# Patient Record
Sex: FEMALE | Race: WHITE | NOT HISPANIC OR LATINO | Employment: OTHER | URBAN - METROPOLITAN AREA
[De-identification: names, ages, dates, MRNs, and addresses within clinical notes are randomized per-mention and may not be internally consistent; named-entity substitution may affect disease eponyms.]

---

## 2017-02-01 ENCOUNTER — TRANSFERRED RECORDS (OUTPATIENT)
Dept: HEALTH INFORMATION MANAGEMENT | Facility: CLINIC | Age: 77
End: 2017-02-01

## 2017-03-05 DIAGNOSIS — Z78.0 MENOPAUSE: ICD-10-CM

## 2017-03-05 DIAGNOSIS — I10 ESSENTIAL HYPERTENSION, BENIGN: Chronic | ICD-10-CM

## 2017-03-06 NOTE — TELEPHONE ENCOUNTER
metoprolol (TOPROL-XL) 50 MG 24 hr tablet 90 tablet 0 12/12/2016         Last Written Prescription Date: 12/12/2016  Last Fill Quantity: 90, # refills: 0    Last Office Visit with [facility], P or [facility] prescribing provider:  12/18/2015   Future Office Visit:        BP Readings from Last 3 Encounters:   07/13/16 126/90   01/04/16 111/78   12/21/15 (!) 116/97

## 2017-03-07 NOTE — TELEPHONE ENCOUNTER
Routing to TC.   Please contact patient and schedule Ov with PCP.    Patient overdue.  Notified with last refill, however did not schedule.   When scheduled, please route back to refill pool for RX completion.    Thank you,  Mary Foote RN, BSN

## 2017-03-08 RX ORDER — METOPROLOL SUCCINATE 50 MG/1
TABLET, EXTENDED RELEASE ORAL
Qty: 90 TABLET | Refills: 0 | Status: SHIPPED | OUTPATIENT
Start: 2017-03-08 | End: 2017-04-10

## 2017-03-08 NOTE — TELEPHONE ENCOUNTER
Routing refill request to provider for review/approval because:  Patient needs to be seen because it has been more than 1 year since last office visit.  Patient is now scheduled with [facility] for medication check 3/15/17  Pended script for 30 day supply with note that further refills to be addressed at appointment.   PCP: Please see pended medication    Thank you!    Adelia Talbot RN

## 2017-03-09 NOTE — TELEPHONE ENCOUNTER
Rx sent in for 90 day supply - if she wants to set up an apt with me in the next few months rather than with S[facility] that is fine

## 2017-04-01 DIAGNOSIS — I10 ESSENTIAL HYPERTENSION, BENIGN: Chronic | ICD-10-CM

## 2017-04-01 DIAGNOSIS — Z78.0 MENOPAUSE: ICD-10-CM

## 2017-04-01 NOTE — TELEPHONE ENCOUNTER
PREMARIN 0.625 MG tablet        Last Written Prescription Date: 12/12/2016  Last Fill Quantity: 90, # refills: 0  Last Office Visit with [facility], [facility] or [facility] prescribing provider: 12/18/2015  Next 5 appointments (look out 90 days)     Apr 10, 2017  9:30 AM CDT   [facility] Visit with Pamela Bobby DO   [facility]    [address] [facility] [address]   [phone]                   BP Readings from Last 3 Encounters:   07/13/16 126/90   01/04/16 111/78   12/21/15 (!) 116/97     Date of last Breast Exam: 12/15/2016

## 2017-04-03 RX ORDER — CONJUGATED ESTROGENS 0.62 MG/1
TABLET, FILM COATED ORAL
Qty: 10 TABLET | Refills: 0 | Status: SHIPPED | OUTPATIENT
Start: 2017-04-03 | End: 2017-04-10

## 2017-04-03 NOTE — TELEPHONE ENCOUNTER
Routing refill request to provider for review/approval because:  Patient is very overdue for OV with PCP.   Has scheduled appointment 4-10-17---however has multiple cancels after scheduling appointments.      Mary Foote RN, BSN

## 2017-04-08 DIAGNOSIS — Z78.0 MENOPAUSE: ICD-10-CM

## 2017-04-08 DIAGNOSIS — I10 ESSENTIAL HYPERTENSION, BENIGN: Chronic | ICD-10-CM

## 2017-04-10 ENCOUNTER — OFFICE VISIT (OUTPATIENT)
Dept: FAMILY MEDICINE | Facility: CLINIC | Age: 77
End: 2017-04-10
Payer: COMMERCIAL

## 2017-04-10 VITALS
DIASTOLIC BLOOD PRESSURE: 87 MMHG | SYSTOLIC BLOOD PRESSURE: 127 MMHG | HEART RATE: 65 BPM | OXYGEN SATURATION: 98 % | TEMPERATURE: 97.3 F | HEIGHT: 63 IN | RESPIRATION RATE: 18 BRPM | WEIGHT: 139 LBS | BODY MASS INDEX: 24.63 KG/M2

## 2017-04-10 DIAGNOSIS — I10 ESSENTIAL HYPERTENSION, BENIGN: Chronic | ICD-10-CM

## 2017-04-10 DIAGNOSIS — N39.41 URGE URINARY INCONTINENCE: Primary | Chronic | ICD-10-CM

## 2017-04-10 DIAGNOSIS — R19.8 IRREGULAR BOWEL HABITS: ICD-10-CM

## 2017-04-10 DIAGNOSIS — Z78.0 MENOPAUSE: ICD-10-CM

## 2017-04-10 DIAGNOSIS — Z12.11 SCREENING FOR COLON CANCER: ICD-10-CM

## 2017-04-10 PROCEDURE — 99214 OFFICE O/P EST MOD 30 MIN: CPT | Performed by: INTERNAL MEDICINE

## 2017-04-10 RX ORDER — DEXAMETHASONE 0.5 MG/5ML
ELIXIR ORAL DAILY PRN
COMMUNITY
Start: 2017-02-02 | End: 2024-02-09

## 2017-04-10 RX ORDER — MIRABEGRON 25 MG/1
25 TABLET, FILM COATED, EXTENDED RELEASE ORAL DAILY
Qty: 30 TABLET | Refills: 0 | Status: SHIPPED | OUTPATIENT
Start: 2017-04-10 | End: 2017-04-25

## 2017-04-10 RX ORDER — SUCRALFATE 1 G/10ML
SUSPENSION ORAL
Status: ON HOLD | COMMUNITY
Start: 2017-02-24 | End: 2017-05-25

## 2017-04-10 RX ORDER — METOPROLOL SUCCINATE 50 MG/1
50 TABLET, EXTENDED RELEASE ORAL DAILY
Qty: 90 TABLET | Refills: 3 | Status: CANCELLED | OUTPATIENT
Start: 2017-04-10

## 2017-04-10 RX ORDER — METOPROLOL SUCCINATE 50 MG/1
50 TABLET, EXTENDED RELEASE ORAL DAILY
Qty: 90 TABLET | Refills: 3 | Status: SHIPPED | OUTPATIENT
Start: 2017-04-10 | End: 2018-06-27

## 2017-04-10 RX ORDER — CYCLOSPORINE 0.5 MG/ML
EMULSION OPHTHALMIC
COMMUNITY
Start: 2017-02-13 | End: 2017-09-08

## 2017-04-10 RX ORDER — CONJUGATED ESTROGENS 0.62 MG/1
TABLET, FILM COATED ORAL
Qty: 90 TABLET | Refills: 0 | OUTPATIENT
Start: 2017-04-10

## 2017-04-10 RX ORDER — CEVIMELINE HYDROCHLORIDE 30 MG/1
30 CAPSULE ORAL 3 TIMES DAILY
COMMUNITY
Start: 2017-02-09

## 2017-04-10 ASSESSMENT — ANXIETY QUESTIONNAIRES
2. NOT BEING ABLE TO STOP OR CONTROL WORRYING: SEVERAL DAYS
3. WORRYING TOO MUCH ABOUT DIFFERENT THINGS: SEVERAL DAYS
7. FEELING AFRAID AS IF SOMETHING AWFUL MIGHT HAPPEN: SEVERAL DAYS
6. BECOMING EASILY ANNOYED OR IRRITABLE: NOT AT ALL
IF YOU CHECKED OFF ANY PROBLEMS ON THIS QUESTIONNAIRE, HOW DIFFICULT HAVE THESE PROBLEMS MADE IT FOR YOU TO DO YOUR WORK, TAKE CARE OF THINGS AT HOME, OR GET ALONG WITH OTHER PEOPLE: NOT DIFFICULT AT ALL
GAD7 TOTAL SCORE: 4
1. FEELING NERVOUS, ANXIOUS, OR ON EDGE: NOT AT ALL
5. BEING SO RESTLESS THAT IT IS HARD TO SIT STILL: NOT AT ALL

## 2017-04-10 ASSESSMENT — PATIENT HEALTH QUESTIONNAIRE - PHQ9: 5. POOR APPETITE OR OVEREATING: SEVERAL DAYS

## 2017-04-10 NOTE — PATIENT INSTRUCTIONS
You will be contacted by a  service to schedule colonoscopy for this spring.     Begin taking Myrbetriq daily. Let me know if this is as effective as Oxybutynin, monitor for any changes to blood pressure. Eventually if this works well can increase to 50 mg Myrbetriq daily and STOP the Oxybutynin.    Try taking 1/2-1 cap of Miralax a day and see if that helps with bowel movements.     I refilled your Metoprolol and Premarin.  Follow up with me annually or sooner as needed.

## 2017-04-10 NOTE — MR AVS SNAPSHOT
After Visit Summary   4/10/2017    Ashanti Jarvis    MRN: [id number]           Patient Information     Date Of Birth          1940        Visit Information        Provider Department      4/10/2017 9:30 AM Pamela Bobby,  [facility]        Today's Diagnoses     Urge urinary incontinence    -  1    Essential hypertension, benign        Menopause        Screening for colon cancer        Irregular bowel habits          Care Instructions      You will be contacted by a  service to schedule colonoscopy for this spring.     Begin taking Myrbetriq daily. Let me know if this is as effective as Oxybutynin, monitor for any changes to blood pressure. Eventually if this works well can increase to 50 mg Myrbetriq daily and STOP the Oxybutynin.    Try taking 1/2-1 cap of Miralax a day and see if that helps with bowel movements.     I refilled your Metoprolol and Premarin.  Follow up with me annually or sooner as needed.         Follow-ups after your visit        Additional Services     GASTROENTEROLOGY ADULT REF PROCEDURE ONLY       Last Lab Result: Creatinine (mg/dL)       Date                     Value                 11/24/2015               0.6              ----------  Body mass index is 24.62 kg/(m^2).     Needed:  No  Language:  English    Patient will be contacted to schedule procedure.     Please be aware that coverage of these services is subject to the terms and limitations of your health insurance plan.  Call member services at your health plan with any benefit or coverage questions.  Any procedures must be performed at a [facility] facility OR coordinated by your clinic's referral office.    Please bring the following with you to your appointment:    (1) Any X-Rays, CTs or MRIs which have been performed.  Contact the facility where they were done to arrange for  prior to your scheduled appointment.    (2) List of current medications   (3) This  "referral request   (4) Any documents/labs given to you for this referral                  Who to contact     If you have questions or need follow up information about today's clinic visit or your schedule please contact [facility] directly at [phone].  Normal or non-critical lab and imaging results will be communicated to you by Tr[employer], letter or phone within 4 business days after the clinic has received the results. If you do not hear from us within 7 days, please contact the clinic through Tranzlog[employer] or phone. If you have a critical or abnormal lab result, we will notify you by phone as soon as possible.  Submit refill requests through Signpost or call your pharmacy and they will forward the refill request to us. Please allow 3 business days for your refill to be completed.          Additional Information About Your Visit        [employer] Information     Signpost gives you secure access to your electronic health record. If you see a primary care provider, you can also send messages to your care team and make appointments. If you have questions, please call your primary care clinic.  If you do not have a primary care provider, please call [phone] and they will assist you.        Care EveryWhere ID     This is your Care EveryWhere ID. This could be used by other organizations to access your [place] medical records  [id number]        Your Vitals Were     Pulse Temperature Respirations Height Pulse Oximetry BMI (Body Mass Index)    65 97.3  F (36.3  C) (Oral) 18 5' 3\" (1.6 m) 98% 24.62 kg/m2       Blood Pressure from Last 3 Encounters:   04/10/17 127/87   07/13/16 126/90   01/04/16 111/78    Weight from Last 3 Encounters:   04/10/17 139 lb (63 kg)   07/13/16 140 lb (63.5 kg)   12/21/15 148 lb (67.1 kg)              We Performed the Following     GASTROENTEROLOGY ADULT REF PROCEDURE ONLY          Today's Medication Changes          These changes are accurate as of: 4/10/17 10:05 AM.  If you have any " questions, ask your nurse or doctor.               Start taking these medicines.        Dose/Directions    mirabegron 25 MG 24 hr tablet   Commonly known as:  MYRBETRIQ   Used for:  Urge urinary incontinence   Started by:  Pamela Bobby DO        Dose:  25 mg   Take 1 tablet (25 mg) by mouth daily   Quantity:  30 tablet   Refills:  0         These medicines have changed or have updated prescriptions.        Dose/Directions    estrogens (conjugated) 0.625 MG tablet   Commonly known as:  PREMARIN   This may have changed:  See the new instructions.   Used for:  Essential hypertension, benign, Menopause   Changed by:  Pamela Bobby DO        Dose:  0.625 mg   Take 1 tablet (0.625 mg) by mouth daily   Quantity:  90 tablet   Refills:  3       metoprolol 50 MG 24 hr tablet   Commonly known as:  TOPROL-XL   This may have changed:  See the new instructions.   Used for:  Essential hypertension, benign   Changed by:  Pamela Bobby DO        Dose:  50 mg   Take 1 tablet (50 mg) by mouth daily   Quantity:  90 tablet   Refills:  3         Stop taking these medicines if you haven't already. Please contact your care team if you have questions.     calcium carb 1250 mg (500 mg [place])/vitamin D 200 units 500-200 MG-UNIT per tablet   Commonly known as:  OSCAL with D   Stopped by:  Pamela Bobby DO           oxyCODONE-acetaminophen 5-325 MG per tablet   Commonly known as:  PERCOCET   Stopped by:  Pamela Bobby DO                Where to get your medicines      These medications were sent to [facility] - [place], [place] - [address]  [address] [facility] [address], [place] [address]     Phone:  [phone]     estrogens (conjugated) 0.625 MG tablet    metoprolol 50 MG 24 hr tablet    mirabegron 25 MG 24 hr tablet                Primary Care Provider Office Phone # Fax #    Pamela Bobby [phone]       [facility] [address]  [place]  [place] [address]        Thank you!     Thank you for choosing [facility]  for your care. Our goal is always to provide you with excellent care. Hearing back from our patients is one way we can continue to improve our services. Please take a few minutes to complete the written survey that you may receive in the mail after your visit with us. Thank you!             Your Updated Medication List - Protect others around you: Learn how to safely use, store and throw away your medicines at www.disposemymeds.org.          This list is accurate as of: 4/10/17 10:05 AM.  Always use your most recent med list.                   Brand Name Dispense Instructions for use    CARAFATE 1 GM/10ML suspension   Generic drug:  sucralfate          cevimeline 30 MG capsule    EVOXAC         dexamethasone 0.5 MG/5ML Elix          estrogens (conjugated) 0.625 MG tablet    PREMARIN    90 tablet    Take 1 tablet (0.625 mg) by mouth daily       fish oil-omega-3 fatty acids 1000 MG capsule     360 capsule    Take 2 capsules (2 g) by mouth daily       folic acid 1 MG tablet    FOLVITE    90 tablet    Take 1 tablet (1 mg) by mouth daily       leflunomide 20 MG tablet    ARAVA     Take 1 tablet by mouth daily       metoprolol 50 MG 24 hr tablet    TOPROL-XL    90 tablet    Take 1 tablet (50 mg) by mouth daily       mirabegron 25 MG 24 hr tablet    MYRBETRIQ    30 tablet    Take 1 tablet (25 mg) by mouth daily       Multi-vitamin Tabs tablet     100 tablet    Take 1 tablet by mouth daily       oxybutynin 5 MG 24 hr tablet    DITROPAN-XL    90 tablet    Take 1 tablet (5 mg) by mouth daily       PROBIOTIC FORMULA PO      Take  by mouth daily.       RESTASIS 0.05 % ophthalmic emulsion   Generic drug:  cycloSPORINE

## 2017-04-10 NOTE — PROGRESS NOTES
"  SUBJECTIVE:                                                    Ashanti Jarvis is a 76 year old female who presents to clinic today for the following health issues:    Hypertension Follow-up      Outpatient blood pressures are not being checked.    Low Salt Diet: not monitoring salt       Amount of exercise or physical activity: walking    Problems taking medications regularly: No    Medication side effects: none    Diet: regular (no restrictions)    Ashanti is a 76 YoF who presents to the clinic for a medication review. Pt is due to a colonoscopy with spring, last colonoscopy with benign polyps. No longer completing PAP smears, s/p total hysterectomy with ovaries in place. Up to date on mammogram.   Ashanti reports that her bowel movements have been smaller than normal, she feel that she is not passing a full movement in one sitting. Denies any pain with BMs.   Continues to take Oxybutynin with good effect but is experiencing negative side effect of dry mouth.   Would like to continue with hormone therapy and reports feeling well.     Problem list and histories reviewed & adjusted, as indicated.    ROS:  Detailed as above    This document serves as a record of the services and decisions personally performed and made by Pamela Bobby DO. It was created on his/her behalf by Elizabeth Saldivar, a trained medical scribe. The creation of this document is based the provider's statements to the medical scribe.  Scribariadne Saldivar 9:49 AM, April 10, 2017    OBJECTIVE:                                                    /87 (BP Location: Right arm, Patient Position: Right side, Cuff Size: Adult Regular)  Pulse 65  Temp 97.3  F (36.3  C) (Oral)  Resp 18  Ht 5' 3\" (1.6 m)  Wt 139 lb (63 kg)  SpO2 98%  BMI 24.62 kg/m2  Body mass index is 24.62 kg/(m^2).  Bright, pleasant, NAD, robust for age  HRRR no m/r/g  No carotid bruits     ASSESSMENT/PLAN:                                                      1. " Urge urinary incontinence  Try to taper off of  oxybutynin due to persistent dry mouth and possibly also contributing to her bowel symptoms.  Will gradually increase Myrbetriq if effective.   - mirabegron (MYRBETRIQ) 25 MG 24 hr tablet; Take 1 tablet (25 mg) by mouth daily  Dispense: 30 tablet; Refill: 0    2. Essential hypertension, benign  At goal.   Gets labs at rheumatologist, will request my labs at next visit which is in August.   - metoprolol (TOPROL-XL) 50 MG 24 hr tablet; Take 1 tablet (50 mg) by mouth daily  Dispense: 90 tablet; Refill: 3    3. Menopause  The current medical regimen is effective;  continue present plan and medications. Aware of s/e which could occur such as stroke/MI/thromboembolism, etc but she wishes to continue as she feels well on HRT   S/p hysterectomy (but ovaries still in place)  - estrogens, conjugated, (PREMARIN) 0.625 MG tablet; Take 1 tablet (0.625 mg) by mouth daily  Dispense: 90 tablet; Refill: 3    4. Screening for colon cancer  Due for this spring.   Polyps last colonoscopy.   - GASTROENTEROLOGY ADULT REF PROCEDURE ONLY    5. Irregular bowel habits  See pt instructions to trial Miralax.  Maybe related to pelvic floor dysfunction or Oxybutynin.  Colonoscopy due.    ANEL-7 SCORE 11/26/2012 4/10/2017   Total Score 13 -   Total Score - 4     Patient Instructions     You will be contacted by a  service to schedule colonoscopy for this spring.     Begin taking Myrbetriq daily. Let me know if this is as effective as Oxybutynin, monitor for any changes to blood pressure. Eventually if this works well can increase to 50 mg Myrbetriq daily and STOP the Oxybutynin.    Try taking 1/2-1 cap of Miralax a day and see if that helps with bowel movements.     I refilled your Metoprolol and Premarin.  Follow up with me annually or sooner as needed.     The information in this document, created by the medical scribe for me, accurately reflects the services I personally performed and the  decisions made by me. I have reviewed and approved this document for accuracy prior to leaving the patient care area.  Pamela Bobby DO  9:49 AM, 04/10/17    Pamela Bobby DO  [facility]

## 2017-04-10 NOTE — NURSING NOTE
"Chief Complaint   Patient presents with     Anxiety     Hypertension       Initial /87 (BP Location: Right arm, Patient Position: Right side, Cuff Size: Adult Regular)  Pulse 65  Temp 97.3  F (36.3  C) (Oral)  Resp 18  Ht 5' 3\" (1.6 m)  Wt 139 lb (63 kg)  SpO2 98%  BMI 24.62 kg/m2 Estimated body mass index is 24.62 kg/(m^2) as calculated from the following:    Height as of this encounter: 5' 3\" (1.6 m).    Weight as of this encounter: 139 lb (63 kg).  Medication Reconciliation: complete   Mariana Gomez CMA (AAMA)      "

## 2017-04-11 ASSESSMENT — ANXIETY QUESTIONNAIRES: GAD7 TOTAL SCORE: 4

## 2017-04-24 ENCOUNTER — MYC MEDICAL ADVICE (OUTPATIENT)
Dept: FAMILY MEDICINE | Facility: CLINIC | Age: 77
End: 2017-04-24

## 2017-04-24 DIAGNOSIS — N39.41 URGE URINARY INCONTINENCE: Chronic | ICD-10-CM

## 2017-04-25 RX ORDER — MIRABEGRON 25 MG/1
25 TABLET, FILM COATED, EXTENDED RELEASE ORAL DAILY
Qty: 90 TABLET | Refills: 1 | Status: ON HOLD | OUTPATIENT
Start: 2017-04-25 | End: 2017-05-25

## 2017-04-25 NOTE — TELEPHONE ENCOUNTER
Routing refill request to provider for review/approval because:  Drug not on the [facility] refill protocol   See below MyChart update from pt, this med is working well  Elo RUVALCABA RN

## 2017-04-25 NOTE — TELEPHONE ENCOUNTER
mirabegron (MYRBETRIQ) 25 MG 24 hr tablet        Last Written Prescription Date: 4/10/2017  Last Fill Quantity: 30,  # refills: 0   Last Office Visit with [facility], [facility] or [facility] prescribing provider: 4/10/2017

## 2017-05-04 ENCOUNTER — TRANSFERRED RECORDS (OUTPATIENT)
Dept: HEALTH INFORMATION MANAGEMENT | Facility: CLINIC | Age: 77
End: 2017-05-04

## 2017-05-04 LAB
ALT SERPL-CCNC: 26 IU/L (ref 5–35)
AST SERPL-CCNC: 23 U/L (ref 5–34)
CREAT SERPL-MCNC: 0.62 MG/DL (ref 0.5–1.3)
GFR SERPL CREATININE-BSD FRML MDRD: 100 ML/MIN/1.73M2

## 2017-05-25 ENCOUNTER — SURGERY (OUTPATIENT)
Age: 77
End: 2017-05-25

## 2017-05-25 ENCOUNTER — HOSPITAL ENCOUNTER (OUTPATIENT)
Facility: CLINIC | Age: 77
Discharge: HOME | End: 2017-05-25
Attending: SPECIALIST | Admitting: SPECIALIST
Payer: MEDICARE

## 2017-05-25 VITALS
DIASTOLIC BLOOD PRESSURE: 63 MMHG | HEIGHT: 63 IN | HEART RATE: 67 BPM | WEIGHT: 130 LBS | OXYGEN SATURATION: 95 % | SYSTOLIC BLOOD PRESSURE: 93 MMHG | BODY MASS INDEX: 23.04 KG/M2 | RESPIRATION RATE: 13 BRPM

## 2017-05-25 LAB — COLONOSCOPY: NORMAL

## 2017-05-25 PROCEDURE — 25000128 H RX IP 250 OP 636: Performed by: SPECIALIST

## 2017-05-25 PROCEDURE — 99153 MOD SED SAME PHYS/QHP EA: CPT | Performed by: SPECIALIST

## 2017-05-25 PROCEDURE — 45378 DIAGNOSTIC COLONOSCOPY: CPT | Performed by: SPECIALIST

## 2017-05-25 PROCEDURE — 25000125 ZZHC RX 250: Performed by: SPECIALIST

## 2017-05-25 PROCEDURE — G0105 COLORECTAL SCRN; HI RISK IND: HCPCS | Performed by: SPECIALIST

## 2017-05-25 PROCEDURE — G0500 MOD SEDAT ENDO SERVICE >5YRS: HCPCS | Performed by: SPECIALIST

## 2017-05-25 RX ORDER — ONDANSETRON 2 MG/ML
4 INJECTION INTRAMUSCULAR; INTRAVENOUS
Status: DISCONTINUED | OUTPATIENT
Start: 2017-05-25 | End: 2017-05-25 | Stop reason: HOSPADM

## 2017-05-25 RX ORDER — FENTANYL CITRATE 50 UG/ML
INJECTION, SOLUTION INTRAMUSCULAR; INTRAVENOUS PRN
Status: DISCONTINUED | OUTPATIENT
Start: 2017-05-25 | End: 2017-05-25 | Stop reason: HOSPADM

## 2017-05-25 RX ORDER — LIDOCAINE 40 MG/G
CREAM TOPICAL
Status: DISCONTINUED | OUTPATIENT
Start: 2017-05-25 | End: 2017-05-25 | Stop reason: HOSPADM

## 2017-05-25 RX ADMIN — FENTANYL CITRATE 25 MCG: 50 INJECTION, SOLUTION INTRAMUSCULAR; INTRAVENOUS at 12:56

## 2017-05-25 RX ADMIN — FENTANYL CITRATE 25 MCG: 50 INJECTION, SOLUTION INTRAMUSCULAR; INTRAVENOUS at 12:47

## 2017-05-25 RX ADMIN — FENTANYL CITRATE 25 MCG: 50 INJECTION, SOLUTION INTRAMUSCULAR; INTRAVENOUS at 12:46

## 2017-05-25 RX ADMIN — FENTANYL CITRATE 50 MCG: 50 INJECTION, SOLUTION INTRAMUSCULAR; INTRAVENOUS at 12:35

## 2017-05-25 RX ADMIN — MIDAZOLAM HYDROCHLORIDE 0.5 MG: 1 INJECTION, SOLUTION INTRAMUSCULAR; INTRAVENOUS at 12:56

## 2017-05-25 RX ADMIN — MIDAZOLAM HYDROCHLORIDE 1 MG: 1 INJECTION, SOLUTION INTRAMUSCULAR; INTRAVENOUS at 12:38

## 2017-05-25 RX ADMIN — MIDAZOLAM HYDROCHLORIDE 1 MG: 1 INJECTION, SOLUTION INTRAMUSCULAR; INTRAVENOUS at 12:32

## 2017-05-25 RX ADMIN — MIDAZOLAM HYDROCHLORIDE 1 MG: 1 INJECTION, SOLUTION INTRAMUSCULAR; INTRAVENOUS at 12:35

## 2017-05-25 RX ADMIN — FENTANYL CITRATE 50 MCG: 50 INJECTION, SOLUTION INTRAMUSCULAR; INTRAVENOUS at 12:32

## 2017-05-25 NOTE — BRIEF OP NOTE
[facility] Brief Operative Note    Pre-operative diagnosis: screening   Post-operative diagnosis Tortuous colon, diverticulosis     Procedure: Procedure(s):  colonoscopy - Wound Class: II-Clean Contaminated   Surgeon(s): Surgeon(s) and Role:     * Alan Crenshaw MD - Primary   Estimated blood loss: * No values recorded between 5/25/2017 12:00 AM and 5/25/2017  1:25 PM *    Specimens: * No specimens in log *   Findings: Please see ProVation procedure note in Chart Review

## 2017-05-25 NOTE — H&P
Pre-Endoscopy History and Physical     Ashanti Jarvis MRN# [id number]   YOB: 1940 Age: 76 year old     Date of Procedure: 5/25/2017  Primary care provider: Pamela Bobby  Type of Endoscopy: Colonoscopy with possible biopsy, possible polypectomy  Reason for Procedure: surveillance  Type of Anesthesia Anticipated: Conscious Sedation    HPI:    Ashanti is a 76 year old female who will be undergoing the above procedure.      A history and physical has been performed. The patient's medications and allergies have been reviewed. The risks and benefits of the procedure and the sedation options and risks were discussed with the patient.  All questions were answered and informed consent was obtained.      She denies a personal or family history of anesthesia complications or bleeding disorders.     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 12-12-12     Chronic maxillary sinusitis S/P drainage of maxillary, frontal sinuses 12-11-12     Senile osteoporosis     Urge urinary incontinence     Lichen planus        Past Medical History:   Diagnosis Date     Benign neoplasm of colon 5/01    tubular adenoma; one non-adenomatous polyp 2012 - rec f/u 5 years     Essential hypertension, benign      Essential tremor      Hx estrogen therapy     Since hysterectomy (estimates ~30 years as of Sept 2014)     Intracranial abscess Dec 2012    H/o sinusitis and intracranial abscess     Lichen planus      Migraine, unspecified, without mention of intractable migraine without mention of status migrainosus 1963     Osteoporosis     Reclast 10/11 --> drug holiday 2015 per rheum     Other isolated  or specific phobias     Claustrophobia     Other kyphoscoliosis and scoliosis      Pemphigoid, benign mucous membrane      Personal history of urinary calculi      Rheumatoid arthritis(714.0) 1991    Followed by Dr Vandana Merino at [facility]     Sjogren's syndrome (H)     due to meds     Unspecified tinnitus     Left ear     Urge urinary incontinence         Past Surgical History:   Procedure Laterality Date     APPENDECTOMY       C NONSPECIFIC PROCEDURE  1987    pneumonia     ENDOSCOPIC BALLOON SINUPLASTY ACCLARENT  12/11/2012    Procedure: ENDOSCOPIC BALLOON SINUPLASTY ACCLARENT;  JOSHUA. MAXILLO SINUS SURGERY,  ;  Surgeon: David Ortega MD;  Location:  OR     HYSTERECTOMY, VAGINAL  1983    has ovaries; secondary to fibroids     KERATOTOMY ARCUATE WITH FEMTOSECOND LASER/IMAGING FOR ATIOL Right 1/4/2016    Procedure: KERATOTOMY ARCUATE WITH FEMTOSECOND LASER/IMAGING FOR ATIOL;  Surgeon: Larry Vilchis MD;  Location: [facility]     OPTICAL TRACKING SYSTEM CRANIOTOMY, EXCISE TUMOR, COMBINED  12/12/2012    Procedure: COMBINED OPTICAL TRACKING SYSTEM CRANIOTOMY, EXCISE TUMOR;  LEFT FRONTAL CRANIOTOMY, IMAGE GUIDANCE FROM FERMIN PALENCIA. - SUPINE, CLAUS MABRY;  Surgeon: Tomi Fernandez MD;  Location:  OR     PHACOEMULSIFICATION CLEAR CORNEA WITH STANDARD INTRAOCULAR LENS IMPLANT Left 12/21/2015    Procedure: PHACOEMULSIFICATION CLEAR CORNEA WITH STANDARD INTRAOCULAR LENS IMPLANT;  Surgeon: Larry Vilchis MD;  Location:  [facility]     PHACOEMULSIFICATION CLEAR CORNEA WITH STANDARD INTRAOCULAR LENS IMPLANT Right 1/4/2016    Procedure: PHACOEMULSIFICATION CLEAR CORNEA WITH STANDARD INTRAOCULAR LENS IMPLANT;  Surgeon: Larry Vilchis MD;  Location: [facility]       Social History   Substance Use Topics     Smoking status: Former Smoker     Packs/day: 0.10     Years: 1.00     Types: Cigarettes     Quit date: 1/1/1962     Smokeless tobacco: Never Used      Comment: tried in college  "    Alcohol use 1.8 oz/week     3 Standard drinks or equivalent per week      Comment:  abt 3 glasses wine per week        Family History   Problem Relation Age of Onset     OSTEOPOROSIS Mother      CANCER Father      Lung CA        Prior to Admission medications    Medication Sig Start Date End Date Taking? Authorizing Provider   calcium carb 1250 mg, 500 mg [place],/vitamin D 200 units (OSCAL WITH D) 500-200 MG-UNIT per tablet Take 1 tablet by mouth 2 times daily (with meals)   Yes Reported, Patient   dexamethasone 0.5 MG/5ML ELIX  2/2/17  Yes Reported, Patient   metoprolol (TOPROL-XL) 50 MG 24 hr tablet Take 1 tablet (50 mg) by mouth daily 4/10/17  Yes Pamela Bobby DO   estrogens, conjugated, (PREMARIN) 0.625 MG tablet Take 1 tablet (0.625 mg) by mouth daily 4/10/17  Yes Pamela Bobby DO   leflunomide (ARAVA) 20 MG tablet Take 1 tablet by mouth daily 7/23/14  Yes Reported, Patient   multivitamin, therapeutic with minerals (MULTI-VITAMIN) TABS Take 1 tablet by mouth daily 12/5/13  Yes Gracie Flood MD   fish oil-omega-3 fatty acids (OMEGA 3) 1000 MG capsule Take 2 capsules (2 g) by mouth daily 12/5/13  Yes Gracie Flood MD   folic acid (FOLVITE) 1 MG tablet Take 1 tablet (1 mg) by mouth daily 12/5/13  Yes Gracie Flood MD   Probiotic Product (PROBIOTIC FORMULA PO) Take  by mouth daily.   Yes Reported, Patient   RESTASIS 0.05 % ophthalmic emulsion  2/13/17   Reported, Patient   cevimeline (EVOXAC) 30 MG capsule  2/9/17   Reported, Patient       Allergies   Allergen Reactions     Vancomycin Rash     \"like lizard skin all over my body\"        REVIEW OF SYSTEMS:   5 point ROS negative except as noted above in HPI, including Gen., Resp., CV, GI &  system review.    PHYSICAL EXAM:   BP (!) 143/100  Pulse 67  Resp 12  Ht 1.6 m (5' 3\")  Wt 59 kg (130 lb)  SpO2 97%  BMI 23.03 kg/m2 Estimated body mass index is 23.03 kg/(m^2) as calculated from the following:    Height as of this encounter: 1.6 m (5' 3\").    " Weight as of this encounter: 59 kg (130 lb).   GENERAL APPEARANCE: alert, and oriented  MENTAL STATUS: alert  AIRWAY EXAM: Mallampatti Class II (visualization of the soft palate, fauces, and uvula)  RESP: lungs clear to auscultation - no rales, rhonchi or wheezes  CV: regular rates and rhythm  DIAGNOSTICS:    Not indicated    IMPRESSION   ASA Class 2 - Mild systemic disease    PLAN:   Plan for Colonoscopy with possible biopsy, possible polypectomy. We discussed the risks, benefits and alternatives and the patient wished to proceed.    The above has been forwarded to the consulting provider.      Signed Electronically by: Alan Crenshaw  May 25, 2017

## 2017-06-25 DIAGNOSIS — I10 ESSENTIAL HYPERTENSION, BENIGN: ICD-10-CM

## 2017-06-26 RX ORDER — METOPROLOL SUCCINATE 50 MG/1
TABLET, EXTENDED RELEASE ORAL
Qty: 90 TABLET | Refills: 3 | OUTPATIENT
Start: 2017-06-26

## 2017-07-14 ENCOUNTER — MYC MEDICAL ADVICE (OUTPATIENT)
Dept: FAMILY MEDICINE | Facility: CLINIC | Age: 77
End: 2017-07-14

## 2017-07-14 DIAGNOSIS — N39.41 URGE URINARY INCONTINENCE: Chronic | ICD-10-CM

## 2017-07-16 RX ORDER — MIRABEGRON 50 MG/1
50 TABLET, EXTENDED RELEASE ORAL DAILY
Qty: 90 TABLET | Refills: 1 | Status: SHIPPED | OUTPATIENT
Start: 2017-07-16 | End: 2017-12-22

## 2017-09-07 ENCOUNTER — TRANSFERRED RECORDS (OUTPATIENT)
Dept: HEALTH INFORMATION MANAGEMENT | Facility: CLINIC | Age: 77
End: 2017-09-07

## 2017-09-07 LAB
ALT SERPL-CCNC: 20 IU/L (ref 5–35)
AST SERPL-CCNC: 23 U/L (ref 5–34)
CREAT SERPL-MCNC: 0.57 MG/DL (ref 0.5–1.3)
GFR SERPL CREATININE-BSD FRML MDRD: 110.2 ML/MIN/1.73M2

## 2017-09-08 ENCOUNTER — OFFICE VISIT (OUTPATIENT)
Dept: PHARMACY | Facility: CLINIC | Age: 77
End: 2017-09-08
Payer: COMMERCIAL

## 2017-09-08 VITALS
DIASTOLIC BLOOD PRESSURE: 85 MMHG | WEIGHT: 139 LBS | SYSTOLIC BLOOD PRESSURE: 125 MMHG | HEART RATE: 65 BPM | BODY MASS INDEX: 24.62 KG/M2

## 2017-09-08 DIAGNOSIS — L12.1: Primary | ICD-10-CM

## 2017-09-08 DIAGNOSIS — N39.41 URGE URINARY INCONTINENCE: ICD-10-CM

## 2017-09-08 DIAGNOSIS — I10 ESSENTIAL HYPERTENSION, BENIGN: ICD-10-CM

## 2017-09-08 DIAGNOSIS — E63.9 NUTRITIONAL DISORDER: ICD-10-CM

## 2017-09-08 DIAGNOSIS — Z79.890 HORMONE REPLACEMENT THERAPY (HRT): ICD-10-CM

## 2017-09-08 DIAGNOSIS — M81.0 SENILE OSTEOPOROSIS: ICD-10-CM

## 2017-09-08 DIAGNOSIS — M06.9 RHEUMATOID ARTHRITIS, INVOLVING UNSPECIFIED SITE, UNSPECIFIED RHEUMATOID FACTOR PRESENCE: ICD-10-CM

## 2017-09-08 PROCEDURE — 99607 MTMS BY PHARM ADDL 15 MIN: CPT | Performed by: PHARMACIST

## 2017-09-08 PROCEDURE — 99605 MTMS BY PHARM NP 15 MIN: CPT | Performed by: PHARMACIST

## 2017-09-08 RX ORDER — FAMOTIDINE 20 MG
1 TABLET ORAL DAILY
COMMUNITY
End: 2024-02-09

## 2017-09-08 NOTE — PATIENT INSTRUCTIONS
Recommendations from today's MTM visit:                                                    MTM (medication therapy management) is a service provided by a clinical pharmacist designed to help you get the most of out of your medicines.   Today we reviewed what your medicines are for, how to know if they are working, that your medicines are safe and how to make your medicine regimen as easy as possible.     1.  Consider setting a recurring alarm on your cell phone as a reminder to take your doses of Evoxac (cevimeline).    2.  I think you probably are due for another DEXA (bone density) scan.  Please talk with your rheumatologist about this.    3.  You could consider getting rid of the fish oil - it's questionable how much benefit you're getting from this.    4.  You an also probably stop folic acid - this was started when you're on methotrexate.    5.  It's up to you whether or not you want to continue the probiotic.      Next MTM visit: 1 year sooner if needed    To schedule another MTM appointment, please call the clinic directly or you may call the MTM scheduling line at [phone] or toll-free at [phone].     My Clinical Pharmacist's contact information:                                                      It was a pleasure seeing you today!  Please feel free to contact me with any questions or concerns you have.      Lucero Villavicencio, Ruth, [facility]  Medication Therapy Management Provider  Pager: [phone]     You may receive a survey about the MTM services you received.  I would appreciate your feedback to help me serve you better in the future. Please fill it out and return it when you can. Your comments will be anonymous.

## 2017-09-08 NOTE — PROGRESS NOTES
"SUBJECTIVE/OBJECTIVE:                           Ashanti Jarvis is a 76 year old female coming in for an initial visit for Medication Therapy Management.  She was referred to me from [employer], her insurance plan.     Chief Complaint: Would like a general medication review.    Personal Healthcare Goals: Being as youthful as possible    Allergies/ADRs: Reviewed in Epic  Tobacco: History of tobacco dependence - quit 50 years ago (only smoked her freshman year of college)  Alcohol: 1-3 beverages / week  Caffeine: 2 cups/day of coffee; no tea or soda  Activity: \"Not very good with that\" - she's active, but no formal exercise regimen    PMH: Reviewed in Epic    Medication Adherence: Sets up own med boxes, takes medications TID (takes everything in the AM with the exception of Evoxac which is TID), rarely misses doses of her medications (but does have a hard time with the TID dosing of Evoxac).    Dry Mouth/Pemphigoid:  Current regimen includes Evoxac 30mg TID, Rhodus mouthwash daily PRN and dexamethasone elixir as needed (2 weeks at a time).  She uses the Rhodus mouthwash first and then uses dexamethasone if sx don't improve.  She reports her sx have been stable on this regimen, which she's very pleased about.  She denies side effects.    Rheumatoid Arthritis:  Current regimen includes leflunomide 20mg daily.  Her rheumatologist is Dr. Carmelita Merino.  She reports her sx are stable and she has no side effects.  She was previously on salicylates, gold, MTX, and Humira before changing to leflunomide.  Since changing, she's done great and says \"I can't even tell I have RA.\"  She has no pain, side effects, or stiffness.      HRT: She reports taking Premarin since she had a hysterectomy around the age of 32.  She is aware of the risks of continued therapy, and she tells me she accepts those risks because she wants to stay on therapy.  She denies side effects.    Osteoporosis: Current therapy includes: " calcium/magnesium/zinc, MVI and Vitamin D 1000 IU daily. Pt is not experiencing side effects.  This has been managed by rheumatology.  Pt is getting the following sources of dietary calcium: yogurt (4x/week) and cheese (2x/week)  Last vitamin D level: None on file in EPIC  DEXA History: 2014 - at that point she was receiving Reclast, it was suggested she have one more (which she did) and then have a drug holiday off.  No DEXA since that time  Risk factors: post-menopausal     Hypertension: Current medications include metoprolol ER 50mg daily.  Patient does not self-monitor BP.  Patient reports no current medication side effects.     Urinary Incontinence:  Current regimen includes Myrbetriq 50mg daily (dose just recently increased).  She's finding this somewhat effective.  She can get to the bathroom in time without leaking, which she wasn't able to do before, even with the lower dose of Myrbetriq and she had dry mouth with oxybutynin.  She did have pelvic floor training with PT.    Supplements: Current supplements include fish oil 2g/day, folic acid 1mg daily and a daily probiotic.  She's unsure why she's taking fish oil - she started this a long time ago.  She was started on folic acid years ago when she was on MTX.  Her daughter is a nurse practitioner and recommended she take a probiotic, but she hasn't really noticed any differences taking it.    Current labs include:BP Readings from Last 3 Encounters:   05/25/17 93/63   04/10/17 127/87   07/13/16 126/90     Today's Vitals: /85  Pulse 65  Wt 139 lb (63 kg)  BMI 24.62 kg/m2     Lab Results   Component Value Date    A1C 5.3 12/11/2012   .  Lab Results   Component Value Date    CHOL 229 07/17/2013     Lab Results   Component Value Date    TRIG 133 07/17/2013     Lab Results   Component Value Date    HDL 62 07/17/2013     Lab Results   Component Value Date     07/17/2013       Liver Function Studies -   Recent Labs   Lab Test 05/04/17 12/30/12    1731   PROTTOTAL   --    --   6.1*   ALBUMIN   --    --   2.8*   BILITOTAL   --    --   0.4   ALKPHOS   --    --   83   AST  23   < >  21   ALT  26   < >  35    < > = values in this interval not displayed.       Lab Results   Component Value Date    UCRR 12 12/05/2013    MICROL  12/05/2013     <5  Urine Microalbumin lowest reportable value has been changed from 2 mg/L to 5   mg/L due to a methodology change on April 16,2012.    UMALCR Unable to calculate 12/05/2013       Last Basic Metabolic Panel:  Lab Results   Component Value Date     05/24/2013      Lab Results   Component Value Date    POTASSIUM 3.9 05/24/2013     Lab Results   Component Value Date    CHLORIDE 101 05/24/2013     Lab Results   Component Value Date    BUN 19 05/24/2013     Lab Results   Component Value Date    CR 0.620 05/04/2017     GFR Estimate   Date Value Ref Range Status   05/04/2017 100.0 ml/min/1.73m2 Final   11/24/2015 104.2 ml/min/1.73m2 Final   07/28/2015 87.4 ml/min/1.73m2 Final       TSH   Date Value Ref Range Status   11/15/2012 1.18 0.4 - 5.0 mU/L Final   ]    Most Recent Immunizations   Administered Date(s) Administered     Influenza (H1N1) 01/06/2010     Influenza (High Dose) 3 valent vaccine 12/23/2015     Influenza (IIV3) 10/05/2013     Pneumococcal (PCV 13) 06/19/2015     Pneumococcal 23 valent 12/21/2010     TD (ADULT, 7+) 12/21/2010     TDAP Vaccine (Adacel) 07/15/2013     Twinrix A/B 03/05/2009       ASSESSMENT:                             Current medications were reviewed today. Medicare Part D topics discussed:OTC products, Recommendations to doctor and Timing of medication    Medication Adherence: no issues identified    Dry Mouth/Pemphigoid: Needs improvement.  May benefit from setting an alarm clock on her cell phone as a reminder for Evoxac doses.  Otherwise stable at this time.    Rheumatoid Arthritis: Stable.    HRT: Would prefer she's not using HRT, but she's aware of the risks and prefers to continue  taking.    Osteoporosis: Needs Improvement.  Due for repeat DEXA scan following bisphosphonate drug holiday.     Hypertension: Stable. Patient is meeting BP goal of < 140/90mmHg.      Incontinence:  Stable.    Supplements:  No indication for fish oil and folic acid at this time, these could be discontinued.  It's questionable how much benefit she's getting from the probiotic, would suggest she also d/c's this.  It sounds like folic acid was started when she was on MTX and it never was discontinued.    PLAN:                            1.  Suggested she set a recurring alarm on her cell phone as a reminder to take Evoxac.  2.  Recommended taking with rheumatology about obtaining updated DEXA scan.  3.  Recommended discontinuing fish oil, folic acid, and probiotic.    I spent 40 minutes with this patient today (an extra 15 minutes was spent creating the Medication Action Plan).  A copy of the visit note was provided to the patient's primary care provider.    Will follow up in 1 year, sooner if needed.    The patient was given a summary of these recommendations as an after visit summary.     Lucero Villavicencio, PharmD, [facility]  Medication Therapy Management Provider  Pager: [phone]

## 2017-09-08 NOTE — MR AVS SNAPSHOT
After Visit Summary   9/8/2017    Ashanti Jarvis    MRN: [id number]           Patient Information     Date Of Birth          1940        Visit Information        Provider Department      9/8/2017 11:00 AM Lucero Villavicencio, [facility] MTM        Care Instructions    Recommendations from today's MTM visit:                                                    MTM (medication therapy management) is a service provided by a clinical pharmacist designed to help you get the most of out of your medicines.   Today we reviewed what your medicines are for, how to know if they are working, that your medicines are safe and how to make your medicine regimen as easy as possible.     1.  Consider setting a recurring alarm on your cell phone as a reminder to take your doses of Evoxac (cevimeline).    2.  I think you probably are due for another DEXA (bone density) scan.  Please talk with your rheumatologist about this.    3.  You could consider getting rid of the fish oil - it's questionable how much benefit you're getting from this.    4.  You an also probably stop folic acid - this was started when you're on methotrexate.    5.  It's up to you whether or not you want to continue the probiotic.      Next MTM visit: 1 year sooner if needed    To schedule another MTM appointment, please call the clinic directly or you may call the MTM scheduling line at [phone] or toll-free at [phone].     My Clinical Pharmacist's contact information:                                                      It was a pleasure seeing you today!  Please feel free to contact me with any questions or concerns you have.      Lucero Villavicencio, PharmD, [facility]  Medication Therapy Management Provider  Pager: [phone]     You may receive a survey about the MTM services you received.  I would appreciate your feedback to help me serve you better in the future. Please fill it out and return it when you can. Your  comments will be anonymous.                     Follow-ups after your visit        Who to contact     If you have questions or need follow up information about today's clinic visit or your schedule please contact [facility] directly at [phone].  Normal or non-critical lab and imaging results will be communicated to you by MyChart, letter or phone within 4 business days after the clinic has received the results. If you do not hear from us within 7 days, please contact the clinic through "[employer]hart or phone. If you have a critical or abnormal lab result, we will notify you by phone as soon as possible.  Submit refill requests through [employer] or call your pharmacy and they will forward the refill request to us. Please allow 3 business days for your refill to be completed.          Additional Information About Your Visit        "[employer] Information     [employer] gives you secure access to your electronic health record. If you see a primary care provider, you can also send messages to your care team and make appointments. If you have questions, please call your primary care clinic.  If you do not have a primary care provider, please call [phone] and they will assist you.        Care EveryWhere ID     This is your Care EveryWhere ID. This could be used by other organizations to access your [place] medical records  [id number]        Your Vitals Were     Pulse BMI (Body Mass Index)                65 24.62 kg/m2           Blood Pressure from Last 3 Encounters:   09/08/17 125/85   05/25/17 93/63   04/10/17 127/87    Weight from Last 3 Encounters:   09/08/17 139 lb (63 kg)   05/25/17 130 lb (59 kg)   04/10/17 139 lb (63 kg)              Today, you had the following     No orders found for display       Primary Care Provider Office Phone # Fax #    Pamela Davies Bobby,  [phone] [address]   [facility] [address]        Equal Access to Services     REECE SHERMAN AH: Arley palacios  chencho Ambrocio, wabrennada luqadaha, qaybta kaalemi savage, ozzy chaiin hayaachuy rhodeslaura morales lalavernechuy kentrell. So [facility] [phone].    ATENCIÓN: Si russla ashok, tiene a rodriguez disposición servicios gratuitos de asistencia lingüística. Berkley al [phone].    We comply with applicable federal civil rights laws and [place] laws. We do not discriminate on the basis of race, color, national origin, age, disability sex, sexual orientation or gender identity.            Thank you!     Thank you for choosing [facility]  for your care. Our goal is always to provide you with excellent care. Hearing back from our patients is one way we can continue to improve our services. Please take a few minutes to complete the written survey that you may receive in the mail after your visit with us. Thank you!             Your Updated Medication List - Protect others around you: Learn how to safely use, store and throw away your medicines at www.disposemymeds.org.          This list is accurate as of: 9/8/17 11:33 AM.  Always use your most recent med list.                   Brand Name Dispense Instructions for use Diagnosis    Calcium-Magnesium-Zinc 167-83-8 MG Tabs      Take 1 tablet by mouth daily        cevimeline 30 MG capsule    EVOXAC     Take 30 mg by mouth 3 times daily        dexamethasone 0.5 MG/5ML Elix      Swish and spit in mouth daily as needed (2 weeks at a time)        estrogens (conjugated) 0.625 MG tablet    PREMARIN    90 tablet    Take 1 tablet (0.625 mg) by mouth daily    Essential hypertension, benign, Menopause       fish oil-omega-3 fatty acids 1000 MG capsule     360 capsule    Take 2 capsules (2 g) by mouth daily        folic acid 1 MG tablet    FOLVITE    90 tablet    Take 1 tablet (1 mg) by mouth daily    Rheumatoid arthritis(714.0)       leflunomide 20 MG tablet    ARAVA     Take 1 tablet by mouth daily        metoprolol 50 MG 24 hr tablet    TOPROL-XL    90 tablet    Take 1 tablet (50 mg)  by mouth daily    Essential hypertension, benign       mirabegron 50 MG 24 hr tablet    MYRBETRIQ    90 tablet    Take 1 tablet (50 mg) by mouth daily    Urge urinary incontinence       Multi-vitamin Tabs tablet     100 tablet    Take 1 tablet by mouth daily        PROBIOTIC FORMULA PO      Take  by mouth daily.        UNABLE TO FIND      MEDICATION NAME: Ana Rosa mouthwash swish and spit daily as needed        Vitamin D (Cholecalciferol) 1000 UNITS Caps      Take 1 capsule by mouth daily

## 2017-09-08 NOTE — LETTER
"     [facility]     Date: 2017    Ashanti Jarvis  [address]   [facility] [address]    Dear Ms. Jarvis,    Thank you for talking with me on 17 about your health and medications. Medicare s MTM (Medication Therapy Management) program helps you understand your medications and use them safely.      This letter includes an action plan (Medication Action Plan) and medication list (Personal Medication List). The action plan has steps you should take to help you get the best results from your medications. The medication list will help you keep track of your medications and how to use them the right way.       Have your action plan and medication list with you when you talk with your doctors, pharmacists, and other healthcare providers in your care team.     Ask your doctors, pharmacists, and other healthcare providers to update the action plan and medication list at every visit.     Take your medication list with you if you go to the hospital or emergency room.     Give a copy of the action plan and medication list to your family or caregivers.     If you want to talk about this letter or any of the papers with it, please call   [phone].   We look forward to working with you, your doctors, and other healthcare providers to help you stay healthy.     Sincerely,    Lucero Villavicencio      Enclosed: Medication Action Plan and Personal Medication List    MEDICATION ACTION PLAN FOR Ashanti Jarvis,  1940     This action plan will help you get the best results from your medications if you:   1. Read \"What we talked about.\"   2. Take the steps listed in the \"What I need to do\" boxes.   3. Fill in \"What I did and when I did it.\"   4. Fill in \"My follow-up plan\" and \"Questions I want to ask.\"     Have this action plan with you when you talk with your doctors, pharmacists, and other healthcare providers in your care team. Share this with your family or caregivers " too.  DATE PREPARED: 2017  What we talked about: Evoxac                                                  What I need to do: Consider setting an alarm clock on your cell phone as a reminder to take this. What I did and when I did it:                                              What we talked about: Bone health                                                  What I need to do: Talk with your rheumatologist about obtaining an updated DEXA scan. What I did and when I did it:                                               What we talked about: Supplements                                                  What I need to do: Consider discontinuing folic acid, fish oil and your probiotic       What I did and when I did it:                                                 My follow-up plan:                 Questions I want to ask:              If you have any questions about your action plan, call Lucero Villavicencio, Phone: [phone] , Monday-Friday 8-4:30pm.           MEDICATION LIST FOR Ashanti Jarvis,  1940     This medication list was made for you after we talked. We also used information from your doctor's chart.      Use blank rows to add new medications. Then fill in the dates you started using them.    Cross out medications when you no longer use them. Then write the date and why you stopped using them.    Ask your doctors, pharmacists, and other healthcare providers to update this list at every visit. Keep this list up-to-date with:       Prescription medications    Over the counter drugs     Herbals    Vitamins    Minerals      If you go to the hospital or emergency room, take this list with you. Share this with your family or caregivers too.     DATE PREPARED: 2017  Allergies or side effects: Vancomycin     Medication:  CALCIUM-MAGNESIUM-ZINC 167-83-8 MG PO TABS      How I use it:  Take 1 tablet by mouth daily      Why I use it:  Supplement    Prescriber:  Patient Reported      Date I started  using it:       Date I stopped using it:         Why I stopped using it:            Medication:  CEVIMELINE HCL 30 MG PO CAPS      How I use it:  Take 30 mg by mouth 3 times daily       Why I use it:  Dry mouth    Prescriber:  Patient Reported      Date I started using it:       Date I stopped using it:         Why I stopped using it:            Medication:  DEXAMETHASONE 0.5 MG/5ML PO ELIX      How I use it:  Swish and spit in mouth daily as needed (2 weeks at a time)       Why I use it: Dry mouth    Prescriber:  Patient Reported      Date I started using it:       Date I stopped using it:         Why I stopped using it:            Medication:  ESTROGENS CONJUGATED 0.625 MG PO TABS      How I use it:  Take 1 tablet (0.625 mg) by mouth daily      Why I use it: Menopause    Prescriber:  Pamela Bobby,       Date I started using it:       Date I stopped using it:         Why I stopped using it:            Medication:  FOLIC ACID 1 MG PO TABS      How I use it:  Take 1 tablet (1 mg) by mouth daily      Why I use it: Rheumatoid arthritis(714.0)    Prescriber:  Gracie Flood MD      Date I started using it:       Date I stopped using it:         Why I stopped using it:            Medication:  LEFLUNOMIDE 20 MG PO TABS      How I use it:  Take 1 tablet by mouth daily      Why I use it:  Rheumatoid arthritis    Prescriber:  Patient Reported      Date I started using it:       Date I stopped using it:         Why I stopped using it:            Medication:  METOPROLOL SUCCINATE ER 50 MG PO TB24      How I use it:  Take 1 tablet (50 mg) by mouth daily      Why I use it: Essential hypertension, benign    Prescriber:  Pamela Bobby DO      Date I started using it:       Date I stopped using it:         Why I stopped using it:            Medication:  MIRABEGRON ER 50 MG PO TB24      How I use it:  Take 1 tablet (50 mg) by mouth daily      Why I use it: Urge urinary incontinence    Prescriber:  Pamela Bobby DO       Date I started using it:       Date I stopped using it:         Why I stopped using it:            Medication:  OMEGA-3 FATTY ACIDS 1000 MG PO CAPS      How I use it:  Take 2 capsules (2 g) by mouth daily      Why I use it:  Supplement    Prescriber:  Gracie Flood MD      Date I started using it:       Date I stopped using it:         Why I stopped using it:            Medication:  PROBIOTIC FORMULA PO      How I use it:  Take  by mouth daily.      Why I use it:  Supplement    Prescriber:  Patient Reported      Date I started using it:       Date I stopped using it:         Why I stopped using it:            Medication:  THERA M PLUS PO TABS      How I use it:  Take 1 tablet by mouth daily      Why I use it:  Supplement    Prescriber:  Gracie Flood MD      Date I started using it:       Date I stopped using it:         Why I stopped using it:            Medication:  UNABLE TO FIND      How I use it:  MEDICATION NAME: Rhodus mouthwash swish and spit daily as needed      Why I use it:  Dry Mouth    Prescriber:  Patient Reported      Date I started using it:       Date I stopped using it:         Why I stopped using it:            Medication:  VITAMIN D (CHOLECALCIFEROL) 1000 UNITS PO CAPS      How I use it:  Take 1 capsule by mouth daily      Why I use it:  Supplement    Prescriber:  Patient Reported      Date I started using it:       Date I stopped using it:         Why I stopped using it:            Medication:         How I use it:         Why I use it:      Prescriber:         Date I started using it:       Date I stopped using it:         Why I stopped using it:            Medication:         How I use it:         Why I use it:      Prescriber:         Date I started using it:       Date I stopped using it:         Why I stopped using it:            Medication:         How I use it:         Why I use it:      Prescriber:         Date I started using it:       Date I stopped using it:         Why I stopped  using it:              Other Information:     If you have any questions about your action plan, call [phone].    According to the Paperwork Reduction Act of 1995, no persons are required to respond to a collection of information unless it displays a valid OMB control number. The valid OMB number for this information collection is [id number]. The time required to complete this information collection is estimated to average 40 minutes per response, including the time to review instructions, searching existing data resources, gather the data needed, and complete and review the information collection. If you have any comments concerning the accuracy of the time estimate(s) or suggestions for improving this form, please write to: CMS, Attn: [employer] Reports Clearance Officer, [address] [place] [phone].

## 2017-10-04 ENCOUNTER — TRANSFERRED RECORDS (OUTPATIENT)
Dept: HEALTH INFORMATION MANAGEMENT | Facility: CLINIC | Age: 77
End: 2017-10-04

## 2017-11-01 ENCOUNTER — MYC MEDICAL ADVICE (OUTPATIENT)
Dept: FAMILY MEDICINE | Facility: CLINIC | Age: 77
End: 2017-11-01

## 2017-12-22 ENCOUNTER — OFFICE VISIT (OUTPATIENT)
Dept: FAMILY MEDICINE | Facility: CLINIC | Age: 77
End: 2017-12-22
Payer: COMMERCIAL

## 2017-12-22 VITALS
HEIGHT: 63 IN | WEIGHT: 141 LBS | OXYGEN SATURATION: 97 % | DIASTOLIC BLOOD PRESSURE: 82 MMHG | HEART RATE: 74 BPM | BODY MASS INDEX: 24.98 KG/M2 | SYSTOLIC BLOOD PRESSURE: 146 MMHG | TEMPERATURE: 98.5 F

## 2017-12-22 DIAGNOSIS — M41.9 SCOLIOSIS, UNSPECIFIED SCOLIOSIS TYPE, UNSPECIFIED SPINAL REGION: ICD-10-CM

## 2017-12-22 DIAGNOSIS — R10.9 LEFT FLANK PAIN: Primary | ICD-10-CM

## 2017-12-22 LAB
ALBUMIN UR-MCNC: ABNORMAL MG/DL
APPEARANCE UR: ABNORMAL
BILIRUB UR QL STRIP: ABNORMAL
COLOR UR AUTO: ABNORMAL
GLUCOSE UR STRIP-MCNC: ABNORMAL MG/DL
HGB UR QL STRIP: ABNORMAL
KETONES UR STRIP-MCNC: ABNORMAL MG/DL
LEUKOCYTE ESTERASE UR QL STRIP: ABNORMAL
NITRATE UR QL: ABNORMAL
PH UR STRIP: ABNORMAL PH (ref 5–7)
SOURCE: ABNORMAL
SP GR UR STRIP: ABNORMAL (ref 1–1.03)
UROBILINOGEN UR STRIP-ACNC: ABNORMAL EU/DL (ref 0.2–1)

## 2017-12-22 PROCEDURE — 99213 OFFICE O/P EST LOW 20 MIN: CPT | Performed by: INTERNAL MEDICINE

## 2017-12-22 NOTE — PATIENT INSTRUCTIONS
Urine sample today and I'll report results on MyChart this evening    Orthopedics will call you next week and offer an appointment to consider an injection    Let our clinic know if you develop a blistery rash on your back this weekend

## 2017-12-22 NOTE — PROGRESS NOTES
"  SUBJECTIVE:   Ashanti Jarvis is a 77 year old female who presents to clinic today for the following health issues:    [place] is here for add-on today for left flank and abdominal pain beginning 4 days ago; came without warning. Radiates around to front. Only painful when standing/walking. No rash. Not with sitting/lying down. No urinary symptoms present. Has a hx of scoliosis and this is the location where her scoliotic curve is. No systemic symptoms or fever. Normal BM this AM. Does have past h/o kidney stone and passed without difficulty about 10 years ago. This pain doesn't seem the same to her as her past kidney stone pain. Slept well last night. Did take an Advil today. Epidural in the past (9 years ago) helped but no recent one (3-4 years ago). Also h/o RA too. Mostly wanted to r/o UTI or stone prior to the long holiday weekend coming up but the more she thinks about it she thinks its r/t her spine.  Myrbetriq doesn't work. Icon Undies are something new she bought for urinary leakage that she is trying.    Problem list and histories reviewed & adjusted, as indicated.    ROS:  Detailed as above     OBJECTIVE:     /82  Pulse 74  Temp 98.5  F (36.9  C) (Tympanic)  Ht 5' 3\" (1.6 m)  Wt 141 lb (64 kg)  SpO2 97%  Breastfeeding? No  BMI 24.98 kg/m2  Body mass index is 24.98 kg/(m^2).  Alert, pleasant, healthy appearing, NAD  No icterus  No rash on flank or abdomen  Abdomen soft, NT, ND, no bruits, +BS, no HSM to deep palpation  Left flank area is really just noted to be tender to area of curve of her back  Walks without assist device    ASSESSMENT/PLAN:     Left flank pain in the setting of h/o scoliosis, RA, osteoporosis and remote nephrolithiasis    Watch for development of shingles  Likely is MSK pain  Does have h/o osteoporosis - see recent scanned in DEXA from her rheumatologist Dr. Merino  ADDENDUM: She was going to leave a UA but then she couldn't go to the bathroom so decided to leave the " clinic (I called her at home and found this out)  - ORTHO  REFERRAL  - *UA reflex to Microscopic and Culture ([facility] (except [place] and [place]); Future       Patient Instructions   Urine sample today and I'll report results on MyChart this evening    Orthopedics will call you next week and offer an appointment to consider an injection    Let our clinic know if you develop a blistery rash on your back this weekend       Pamela Bobby, DO  [facility] [place]

## 2017-12-22 NOTE — NURSING NOTE
"Chief Complaint   Patient presents with     Flank Pain       Initial /88 (BP Location: Right arm, Cuff Size: Adult Regular)  Pulse 74  Temp 98.5  F (36.9  C) (Tympanic)  Ht 5' 3\" (1.6 m)  Wt 141 lb (64 kg)  SpO2 97%  Breastfeeding? No  BMI 24.98 kg/m2 Estimated body mass index is 24.98 kg/(m^2) as calculated from the following:    Height as of this encounter: 5' 3\" (1.6 m).    Weight as of this encounter: 141 lb (64 kg).  Medication Reconciliation: complete   Nissa Reese MA      "

## 2017-12-22 NOTE — MR AVS SNAPSHOT
After Visit Summary   12/22/2017    Ashanti Jarvis    MRN: [id number]           Patient Information     Date Of Birth          1940        Visit Information        Provider Department      12/22/2017 4:30 PM Pamela Bobby,  [facility]        Today's Diagnoses     Left flank pain    -  1    Scoliosis, unspecified scoliosis type, unspecified spinal region          Care Instructions    Urine sample today and I'll report results on MyChart this evening    Orthopedics will call you next week and offer an appointment to consider an injection    Let our clinic know if you develop a blistery rash on your back this weekend           Follow-ups after your visit        Additional Services     ORTHO  REFERRAL       [facility] is referring you to the Orthopedic  Services at [facility].       The  Representative will assist you in the coordination of your Orthopedic and Musculoskeletal Care as prescribed by your physician.    The  Representative will call you within 1 business day to help schedule your appointment, or you may contact the  Representative at:    All areas ~ [phone]     Type of Referral : Non Surgical       Timeframe requested: Routine    Coverage of these services is subject to the terms and limitations of your health insurance plan.  Please call member services at your health plan with any benefit or coverage questions.      If X-rays, CT or MRI's have been performed, please contact the facility where they were done to arrange for , prior to your scheduled appointment.  Please bring this referral request to your appointment and present it to your specialist.                  Who to contact     If you have questions or need follow up information about today's clinic visit or your schedule please contact [facility] [place] directly at [phone].  Normal or non-critical  "lab and imaging results will be communicated to you by MyChart, letter or phone within 4 business days after the clinic has received the results. If you do not hear from us within 7 days, please contact the clinic through [employer] or phone. If you have a critical or abnormal lab result, we will notify you by phone as soon as possible.  Submit refill requests through [employer] or call your pharmacy and they will forward the refill request to us. Please allow 3 business days for your refill to be completed.          Additional Information About Your Visit        [email or web address] Information     [employer] gives you secure access to your electronic health record. If you see a primary care provider, you can also send messages to your care team and make appointments. If you have questions, please call your primary care clinic.  If you do not have a primary care provider, please call [phone] and they will assist you.        Care EveryWhere ID     This is your Care EveryWhere ID. This could be used by other organizations to access your [place] medical records  [id number]        Your Vitals Were     Pulse Temperature Height Pulse Oximetry Breastfeeding? BMI (Body Mass Index)    74 98.5  F (36.9  C) (Tympanic) 5' 3\" (1.6 m) 97% No 24.98 kg/m2       Blood Pressure from Last 3 Encounters:   12/22/17 146/82   09/08/17 125/85   05/25/17 93/63    Weight from Last 3 Encounters:   12/22/17 141 lb (64 kg)   09/08/17 139 lb (63 kg)   05/25/17 130 lb (59 kg)              We Performed the Following     *UA reflex to Microscopic and Culture ([place] and [facility] (except [place] and [place])     ORTHO  REFERRAL     UA reflex to Microscopic and Culture        Primary Care Provider Office Phone # Fax #    Pamela Keke Bobby [phone] [address] [facility] [[email or web address]] [address]  [facility] [address]        Equal Access to Services     RECEE SHERMAN AH: Arley mitchello Cristóbal, waaxda luqadaha, qaybta kaalmada janette, ozzy " jon rhodeslaura meenaguido esteves'aan ah. So [facility] [phone].    ATENCIÓN: Si venus pulido, tiene a rodriguez disposición servicios gratuitos de asistencia lingüística. Berkley al [phone].    We comply with applicable federal civil rights laws and [place] laws. We do not discriminate on the basis of race, color, national origin, age, disability, sex, sexual orientation, or gender identity.            Thank you!     Thank you for choosing [facility]  for your care. Our goal is always to provide you with excellent care. Hearing back from our patients is one way we can continue to improve our services. Please take a few minutes to complete the written survey that you may receive in the mail after your visit with us. Thank you!             Your Updated Medication List - Protect others around you: Learn how to safely use, store and throw away your medicines at www.disposemymeds.org.          This list is accurate as of: 12/22/17  5:16 PM.  Always use your most recent med list.                   Brand Name Dispense Instructions for use Diagnosis    Calcium-Magnesium-Zinc 167-83-8 MG Tabs      Take 1 tablet by mouth daily        cevimeline 30 MG capsule    EVOXAC     Take 30 mg by mouth 3 times daily        dexamethasone 0.5 MG/5ML Elix      Swish and spit in mouth daily as needed (2 weeks at a time)        estrogens (conjugated) 0.625 MG tablet    PREMARIN    90 tablet    Take 1 tablet (0.625 mg) by mouth daily    Essential hypertension, benign, Menopause       leflunomide 20 MG tablet    ARAVA     Take 1 tablet by mouth daily        metoprolol 50 MG 24 hr tablet    TOPROL-XL    90 tablet    Take 1 tablet (50 mg) by mouth daily    Essential hypertension, benign       Multi-vitamin Tabs tablet     100 tablet    Take 1 tablet by mouth daily        PROBIOTIC FORMULA PO      Take  by mouth daily.        UNABLE TO FIND      MEDICATION NAME: Rhodus mouthwash swish and spit daily as needed        Vitamin D  (Cholecalciferol) 1000 UNITS Caps      Take 1 capsule by mouth daily

## 2018-01-03 ENCOUNTER — OFFICE VISIT (OUTPATIENT)
Dept: NEUROSURGERY | Facility: CLINIC | Age: 78
End: 2018-01-03
Attending: PHYSICIAN ASSISTANT
Payer: COMMERCIAL

## 2018-01-03 VITALS
SYSTOLIC BLOOD PRESSURE: 171 MMHG | WEIGHT: 146 LBS | OXYGEN SATURATION: 98 % | TEMPERATURE: 98.1 F | DIASTOLIC BLOOD PRESSURE: 101 MMHG | HEART RATE: 74 BPM | HEIGHT: 63 IN | BODY MASS INDEX: 25.87 KG/M2

## 2018-01-03 DIAGNOSIS — M41.9 SCOLIOSIS OF THORACOLUMBAR SPINE, UNSPECIFIED SCOLIOSIS TYPE: Primary | ICD-10-CM

## 2018-01-03 PROCEDURE — G0463 HOSPITAL OUTPT CLINIC VISIT: HCPCS | Performed by: PHYSICIAN ASSISTANT

## 2018-01-03 PROCEDURE — 99205 OFFICE O/P NEW HI 60 MIN: CPT | Performed by: PHYSICIAN ASSISTANT

## 2018-01-03 NOTE — PROGRESS NOTES
"Dr. Jerson Mayers  [facility]  Neurosurgery Clinic Visit      CC: Left low back pain    Primary care Provider: Pamela Bobby      Reason For Visit:   I was asked by Pamela Bobby DO to consult on the patient for left flank pain.      HPI: Ashanti Jarvis is a 77 year old female who presents for evaluation of chronic left low back and flank pain that has progressively worsened over the past few weeks. Pain is located in left low back and radiates around to the abdomen. Describes the pain as a constant ache. She states she has \"bad scoliosis that was never treated because she was too old.\" Standing and walking cause the pain to worsen. She does not have pain while sitting or lying down. Denies radicular pain to the lower extremities, numbness/tingling, or bladder/bowel incontinence.    Current pain: 0/10 At worst: 8/10    Past Medical History:   Diagnosis Date     Benign neoplasm of colon 5/01    tubular adenoma; one non-adenomatous polyp 2012 - rec f/u 5 years     Essential hypertension, benign      Essential tremor      Hx estrogen therapy     Since hysterectomy (estimates ~30 years as of Sept 2014)     Intracranial abscess Dec 2012    H/o sinusitis and intracranial abscess     Lichen planus      Migraine, unspecified, without mention of intractable migraine without mention of status migrainosus 1963     Osteoporosis     Reclast 10/11 --> drug holiday 2015 per rheum     Other isolated or specific phobias     Claustrophobia     Other kyphoscoliosis and scoliosis      Pemphigoid, benign mucous membrane      Personal history of urinary calculi      Rheumatoid arthritis(714.0) 1991    Followed by Dr Vandana Merino at [facility]     Sjogren's syndrome (H)     due to meds     Unspecified tinnitus     Left ear     Urge urinary incontinence        Past Medical History reviewed with patient during visit.    Past Surgical History:   Procedure Laterality Date     APPENDECTOMY   "     C NONSPECIFIC PROCEDURE  1987    pneumonia     COLONOSCOPY N/A 5/25/2017    Procedure: COLONOSCOPY;  colonoscopy;  Surgeon: Alan Crenshaw MD;  Location:  GI     ENDOSCOPIC BALLOON SINUPLASTY ACCLARENT  12/11/2012    Procedure: ENDOSCOPIC BALLOON SINUPLASTY ACCLARENT;  JOSHUA. MAXILLO SINUS SURGERY,  ;  Surgeon: David Ortega MD;  Location:  OR     HYSTERECTOMY, VAGINAL  1983    has ovaries; secondary to fibroids     KERATOTOMY ARCUATE WITH FEMTOSECOND LASER/IMAGING FOR ATIOL Right 1/4/2016    Procedure: KERATOTOMY ARCUATE WITH FEMTOSECOND LASER/IMAGING FOR ATIOL;  Surgeon: Larry Vilchis MD;  Location: [facility]     OPTICAL TRACKING SYSTEM CRANIOTOMY, EXCISE TUMOR, COMBINED  12/12/2012    Procedure: COMBINED OPTICAL TRACKING SYSTEM CRANIOTOMY, EXCISE TUMOR;  LEFT FRONTAL CRANIOTOMY, IMAGE GUIDANCE FROM FERMIN PALENCIA. - SUPINE, LCAUS MABRY;  Surgeon: Tomi Fernandez MD;  Location:  OR     PHACOEMULSIFICATION CLEAR CORNEA WITH STANDARD INTRAOCULAR LENS IMPLANT Left 12/21/2015    Procedure: PHACOEMULSIFICATION CLEAR CORNEA WITH STANDARD INTRAOCULAR LENS IMPLANT;  Surgeon: Larry Vilchis MD;  Location:  EC     PHACOEMULSIFICATION CLEAR CORNEA WITH STANDARD INTRAOCULAR LENS IMPLANT Right 1/4/2016    Procedure: PHACOEMULSIFICATION CLEAR CORNEA WITH STANDARD INTRAOCULAR LENS IMPLANT;  Surgeon: Larry Vilchis MD;  Location: [facility]     Past Surgical History reviewed with patient during visit.    Current Outpatient Prescriptions   Medication     UNABLE TO FIND     Vitamin D, Cholecalciferol, 1000 UNITS CAPS     Calcium-Magnesium-Zinc 167-83-8 MG TABS     cevimeline (EVOXAC) 30 MG capsule     dexamethasone 0.5 MG/5ML ELIX     metoprolol (TOPROL-XL) 50 MG 24 hr tablet     estrogens, conjugated, (PREMARIN) 0.625 MG tablet     leflunomide (ARAVA) 20 MG tablet     multivitamin, therapeutic with minerals (MULTI-VITAMIN) TABS     Probiotic Product (PROBIOTIC FORMULA PO)     No current  "facility-administered medications for this visit.        Allergies   Allergen Reactions     Vancomycin Rash     \"like lizard skin all over my body\"       Social History     Social History     Marital status:      Spouse name: N/A     Number of children: 2     Years of education: N/A     Social History Main Topics     Smoking status: Former Smoker     Packs/day: 0.10     Years: 1.00     Types: Cigarettes     Quit date: 1/1/1962     Smokeless tobacco: Never Used      Comment: tried in college     Alcohol use 1.8 oz/week     3 Standard drinks or equivalent per week      Comment:  abt 3 glasses wine per week      Drug use: No     Sexual activity: Not Currently     Partners: Male     Other Topics Concern     Not on file     Social History Narrative       Family History   Problem Relation Age of Onset     OSTEOPOROSIS Mother      CANCER Father      Lung CA           ROS: 10 point ROS neg other than the symptoms noted above in the HPI.    Vital Signs: There were no vitals taken for this visit.    Examination:  Constitutional:  Alert, well nourished, NAD.  HEENT: Normocephalic, atraumatic.   Pulmonary:  Without shortness of breath, normal effort.   Lymph: no lymphadenopathy to low back or LE.   Integumentary: Skin is free of rashes or lesions.   Cardiovascular:  No pitting edema of BLE.      Neurological:  Awake  Alert  Oriented x 3  Speech clear  Cranial nerves II - XII grossly intact  PERRL  EOMI  Face symmetric  Tongue midline  Motor exam   Hip Flexor:                Right: 5/5  Left:  5/5  Hip Adductor:             Right:  5/5  Left:  5/5  Hip Abductor:             Right:  5/5  Left:  5/5  Gastroc Soleus:        Right:  5/5  Left:  5/5  Tib/Ant:                      Right:  5/5  Left:  5/5  EHL:                          Right:  5/5  Left:  5/5       Sensation normal to bilateral upper and lower extremities.    Reflexes are 2+ in the patellar and Achilles. There is no clonus. Downgoing " Babinski.  Musculoskeletal:  Gait: Able to stand from a seated position. Normal non-antalgic, non-myelopathic gait.  Able to heel/toe walk without loss of balance  Thoracic examination reveals curvature of spine convex to the right. No tenderness to palpation.  Lumbar examination reveals no tenderness of the spine or paraspinous muscles.  Negative SI joint, sciatic notch or greater trochanteric tenderness to palpation bilaterally.  Straight leg raise is negative bilaterally.      Imaging:   None    Assessment/Plan:   Ashanti Jarvis is a 77 year old female who presents for evaluation of chronic left low back and flank pain that has progressively worsened over the past few weeks. Pain is located in left low back and radiates around to the abdomen. Describes the pain as a constant ache. On exam she does have very apparent scoliosis of the thoracolumbar spine. She does not have any recent films, so I have ordered her a thoracic and lumbar MRI as well as scoliosis xrays and will call her with the results. Patient voiced understanding and agreement.           Shannon Strange PA-C  [facility]  [address] [address]    Tel [phone]  Pager [phone]

## 2018-01-03 NOTE — MR AVS SNAPSHOT
After Visit Summary   1/3/2018    Ashanti Jarvis    MRN: [id number]           Patient Information     Date Of Birth          1940        Visit Information        Provider Department      1/3/2018 11:25 AM Shannon Strange PA-C [facility] [place] [facility]        Today's Diagnoses     Scoliosis of thoracolumbar spine, unspecified scoliosis type    -  1      Care Instructions    Thoracic/lumbar MRI and Scoliosis XR's ordered - will call with results          Follow-ups after your visit        Future tests that were ordered for you today     Open Future Orders        Priority Expected Expires Ordered    MR Thoracic Spine w/o Contrast Routine  1/3/2019 1/3/2018    MR Lumbar Spine w/o Contrast Routine  1/3/2019 1/3/2018    XR Thor/Lumb Standing 2 Views (Scoli) Routine 1/3/2018 1/3/2019 1/3/2018            Who to contact     If you have questions or need follow up information about today's clinic visit or your schedule please contact [facility] directly at [phone].  Normal or non-critical lab and imaging results will be communicated to you by [employer], letter or phone within 4 business days after the clinic has received the results. If you do not hear from us within 7 days, please contact the clinic through [employer] or phone. If you have a critical or abnormal lab result, we will notify you by phone as soon as possible.  Submit refill requests through [employer] or call your pharmacy and they will forward the refill request to us. Please allow 3 business days for your refill to be completed.          Additional Information About Your Visit        MyChart Information     [employer] gives you secure access to your electronic health record. If you see a primary care provider, you can also send messages to your care team and make appointments. If you have questions, please call your primary care clinic.  If you do not have a primary care provider, please call  "[phone] and they will assist you.        Care EveryWhere ID     This is your Care EveryWhere ID. This could be used by other organizations to access your [place] medical records  [id number]        Your Vitals Were     Pulse Temperature Height Pulse Oximetry Breastfeeding? BMI (Body Mass Index)    74 98.1  F (36.7  C) (Oral) 5' 2.75\" (1.594 m) 98% No 26.07 kg/m2       Blood Pressure from Last 3 Encounters:   01/03/18 (!) 171/101   12/22/17 146/82   09/08/17 125/85    Weight from Last 3 Encounters:   01/03/18 146 lb (66.2 kg)   12/22/17 141 lb (64 kg)   09/08/17 139 lb (63 kg)               Primary Care Provider Office Phone # Fax #    Pamela Bobby [phone] [address]   [place] [address]        Equal Access to Services     [facility]: Hadii aad ku hadasho Soomaali, waaxda luqadaha, qaybta kaalmada adeegyada, waxay idiin hayaan meaganeg kharash la'antonyn . So [facility] [phone].    ATENCIÓN: Si habla español, tiene a rodriguez disposición servicios gratuitos de asistencia lingüística. Llame al [phone].    We comply with applicable federal civil rights laws and [place] laws. We do not discriminate on the basis of race, color, national origin, age, disability, sex, sexual orientation, or gender identity.            Thank you!     Thank you for choosing [facility]  for your care. Our goal is always to provide you with excellent care. Hearing back from our patients is one way we can continue to improve our services. Please take a few minutes to complete the written survey that you may receive in the mail after your visit with us. Thank you!             Your Updated Medication List - Protect others around you: Learn how to safely use, store and throw away your medicines at www.disposemymeds.org.          This list is accurate as of: 1/3/18 11:46 AM.  Always use your most recent med list.                   Brand Name Dispense Instructions for use " Diagnosis    Calcium-Magnesium-Zinc 167-83-8 MG Tabs      Take 1 tablet by mouth daily        cevimeline 30 MG capsule    EVOXAC     Take 30 mg by mouth 3 times daily        dexamethasone 0.5 MG/5ML Elix      Swish and spit in mouth daily as needed (2 weeks at a time)        estrogens (conjugated) 0.625 MG tablet    PREMARIN    90 tablet    Take 1 tablet (0.625 mg) by mouth daily    Essential hypertension, benign, Menopause       leflunomide 20 MG tablet    ARAVA     Take 1 tablet by mouth daily        metoprolol 50 MG 24 hr tablet    TOPROL-XL    90 tablet    Take 1 tablet (50 mg) by mouth daily    Essential hypertension, benign       Multi-vitamin Tabs tablet     100 tablet    Take 1 tablet by mouth daily        PROBIOTIC FORMULA PO      Take  by mouth daily.        UNABLE TO FIND      MEDICATION NAME: Ana Rosa mouthwash swish and spit daily as needed        Vitamin D (Cholecalciferol) 1000 UNITS Caps      Take 1 capsule by mouth daily

## 2018-01-03 NOTE — NURSING NOTE
"Ashanti Jarvis is a 77 year old female who presents for:  Chief Complaint   Patient presents with     Neurologic Problem     referral from Dr. MEDARDO Bobby for left flank pain        Initial Vitals:  There were no vitals taken for this visit. Estimated body mass index is 24.98 kg/(m^2) as calculated from the following:    Height as of 12/22/17: 5' 3\" (1.6 m).    Weight as of 12/22/17: 141 lb (64 kg).. There is no height or weight on file to calculate BSA. BP completed using cuff size: regular  Data Unavailable    Do you feel safe in your environment?  Yes  Do you need any refills today? No    Nursing Comments: referral from Dr. MEDARDO Bobby for left flank pain.  Patient rates her pain today as 0 now 8 at its worst      5 min. nursing intake time  Brenda Hennessy CMA, AAS      Discharge plan:   See providers dictation   2 min. nursing discharge time  Brenda Hennessy CMA, AAS       "

## 2018-01-03 NOTE — LETTER
"    1/3/2018         RE: Ashanti Jarvis  [address]   [facility] [address]        Dear Colleague,    Thank you for referring your patient, Ashanti Jarvis, to the [facility]. Please see a copy of my visit note below.    Dr. Jerson Mayers  [facility]  Neurosurgery Clinic Visit      CC: Left low back pain    Primary care Provider: Pamela Bobby      Reason For Visit:   I was asked by Pamela Bobby DO to consult on the patient for left flank pain.      HPI: Ashanti Jarvis is a 77 year old female who presents for evaluation of chronic left low back and flank pain that has progressively worsened over the past few weeks. Pain is located in left low back and radiates around to the abdomen. Describes the pain as a constant ache. She states she has \"bad scoliosis that was never treated because she was too old.\" Standing and walking cause the pain to worsen. She does not have pain while sitting or lying down. Denies radicular pain to the lower extremities, numbness/tingling, or bladder/bowel incontinence.    Current pain: 0/10 At worst: 8/10    Past Medical History:   Diagnosis Date     Benign neoplasm of colon 5/01    tubular adenoma; one non-adenomatous polyp 2012 - rec f/u 5 years     Essential hypertension, benign      Essential tremor      Hx estrogen therapy     Since hysterectomy (estimates ~30 years as of Sept 2014)     Intracranial abscess Dec 2012    H/o sinusitis and intracranial abscess     Lichen planus      Migraine, unspecified, without mention of intractable migraine without mention of status migrainosus 1963     Osteoporosis     Reclast 10/11 --> drug holiday 2015 per rheum     Other isolated or specific phobias     Claustrophobia     Other kyphoscoliosis and scoliosis      Pemphigoid, benign mucous membrane      Personal history of urinary calculi      Rheumatoid arthritis(714.0) 1991    Followed by Dr Vandana Merino at [facility]     Sjogren's syndrome (H)     due to meds     Unspecified tinnitus     Left ear     Urge urinary incontinence        Past Medical History reviewed with patient during visit.    Past Surgical History:   Procedure Laterality Date     APPENDECTOMY       C NONSPECIFIC PROCEDURE  1987    pneumonia     COLONOSCOPY N/A 5/25/2017    Procedure: COLONOSCOPY;  colonoscopy;  Surgeon: Alan Crenshaw MD;  Location:  GI     ENDOSCOPIC BALLOON SINUPLASTY ACCLARENT  12/11/2012    Procedure: ENDOSCOPIC BALLOON SINUPLASTY ACCLARENT;  JOSHUA. MAXILLO SINUS SURGERY,  ;  Surgeon: David Ortega MD;  Location:  OR     HYSTERECTOMY, VAGINAL  1983    has ovaries; secondary to fibroids     KERATOTOMY ARCUATE WITH FEMTOSECOND LASER/IMAGING FOR ATIOL Right 1/4/2016    Procedure: KERATOTOMY ARCUATE WITH FEMTOSECOND LASER/IMAGING FOR ATIOL;  Surgeon: Larry Vilchis MD;  Location: [facility]     OPTICAL TRACKING SYSTEM CRANIOTOMY, EXCISE TUMOR, COMBINED  12/12/2012    Procedure: COMBINED OPTICAL TRACKING SYSTEM CRANIOTOMY, EXCISE TUMOR;  LEFT FRONTAL CRANIOTOMY, IMAGE GUIDANCE FROM FERMIN PALENCIA. - SUPINE, CLAUS MABRY;  Surgeon: Tomi Fernandez MD;  Location:  OR     PHACOEMULSIFICATION CLEAR CORNEA WITH STANDARD INTRAOCULAR LENS IMPLANT Left 12/21/2015    Procedure: PHACOEMULSIFICATION CLEAR CORNEA WITH STANDARD INTRAOCULAR LENS IMPLANT;  Surgeon: Larry Vilchis MD;  Location:  [facility]     PHACOEMULSIFICATION CLEAR CORNEA WITH STANDARD INTRAOCULAR LENS IMPLANT Right 1/4/2016    Procedure: PHACOEMULSIFICATION CLEAR CORNEA WITH STANDARD INTRAOCULAR LENS IMPLANT;  Surgeon: Larry Vilchis MD;  Location: [facility]     Past Surgical History reviewed with patient during visit.    Current Outpatient Prescriptions   Medication     UNABLE TO FIND     Vitamin D, Cholecalciferol, 1000 UNITS CAPS     Calcium-Magnesium-Zinc 167-83-8 MG TABS     cevimeline (EVOXAC) 30 MG capsule     dexamethasone 0.5 MG/5ML ELIX  "    metoprolol (TOPROL-XL) 50 MG 24 hr tablet     estrogens, conjugated, (PREMARIN) 0.625 MG tablet     leflunomide (ARAVA) 20 MG tablet     multivitamin, therapeutic with minerals (MULTI-VITAMIN) TABS     Probiotic Product (PROBIOTIC FORMULA PO)     No current facility-administered medications for this visit.        Allergies   Allergen Reactions     Vancomycin Rash     \"like lizard skin all over my body\"       Social History     Social History     Marital status:      Spouse name: N/A     Number of children: 2     Years of education: N/A     Social History Main Topics     Smoking status: Former Smoker     Packs/day: 0.10     Years: 1.00     Types: Cigarettes     Quit date: 1/1/1962     Smokeless tobacco: Never Used      Comment: tried in college     Alcohol use 1.8 oz/week     3 Standard drinks or equivalent per week      Comment:  abt 3 glasses wine per week      Drug use: No     Sexual activity: Not Currently     Partners: Male     Other Topics Concern     Not on file     Social History Narrative       Family History   Problem Relation Age of Onset     OSTEOPOROSIS Mother      CANCER Father      Lung CA           ROS: 10 point ROS neg other than the symptoms noted above in the HPI.    Vital Signs: There were no vitals taken for this visit.    Examination:  Constitutional:  Alert, well nourished, NAD.  HEENT: Normocephalic, atraumatic.   Pulmonary:  Without shortness of breath, normal effort.   Lymph: no lymphadenopathy to low back or LE.   Integumentary: Skin is free of rashes or lesions.   Cardiovascular:  No pitting edema of BLE.      Neurological:  Awake  Alert  Oriented x 3  Speech clear  Cranial nerves II - XII grossly intact  PERRL  EOMI  Face symmetric  Tongue midline  Motor exam   Hip Flexor:                Right: 5/5  Left:  5/5  Hip Adductor:             Right:  5/5  Left:  5/5  Hip Abductor:             Right:  5/5  Left:  5/5  Gastroc Soleus:        Right:  5/5  Left:  5/5  Tib/Ant:            "           Right:  5/5  Left:  5/5  EHL:                          Right:  5/5  Left:  5/5       Sensation normal to bilateral upper and lower extremities.    Reflexes are 2+ in the patellar and Achilles. There is no clonus. Downgoing Babinski.  Musculoskeletal:  Gait: Able to stand from a seated position. Normal non-antalgic, non-myelopathic gait.  Able to heel/toe walk without loss of balance  Thoracic examination reveals curvature of spine convex to the left. No tenderness to palpation.  Lumbar examination reveals no tenderness of the spine or paraspinous muscles.  Negative SI joint, sciatic notch or greater trochanteric tenderness to palpation bilaterally.  Straight leg raise is negative bilaterally.      Imaging:   None    Assessment/Plan:   Ashanti Jarvis is a 77 year old female who presents for evaluation of chronic left low back and flank pain that has progressively worsened over the past few weeks. Pain is located in left low back and radiates around to the abdomen. Describes the pain as a constant ache. On exam she does have very apparent scoliosis of the thoracolumbar spine. She does not have any recent films, so I have ordered her a thoracic and lumbar MRI as well as scoliosis xrays and will call her with the results. Patient voiced understanding and agreement.           Shannon Strange PA-C  [place] [address]    Tel [phone]  Pager [phone]      Again, thank you for allowing me to participate in the care of your patient.        Sincerely,        Shannon Stragne PA-C

## 2018-01-04 ENCOUNTER — HOSPITAL ENCOUNTER (OUTPATIENT)
Dept: GENERAL RADIOLOGY | Facility: CLINIC | Age: 78
End: 2018-01-04
Attending: PHYSICIAN ASSISTANT
Payer: MEDICARE

## 2018-01-04 ENCOUNTER — HOSPITAL ENCOUNTER (OUTPATIENT)
Dept: MRI IMAGING | Facility: CLINIC | Age: 78
End: 2018-01-04
Attending: PHYSICIAN ASSISTANT
Payer: MEDICARE

## 2018-01-04 ENCOUNTER — HOSPITAL ENCOUNTER (OUTPATIENT)
Dept: MRI IMAGING | Facility: CLINIC | Age: 78
Discharge: HOME | End: 2018-01-04
Attending: PHYSICIAN ASSISTANT | Admitting: PHYSICIAN ASSISTANT
Payer: MEDICARE

## 2018-01-04 DIAGNOSIS — M41.9 SCOLIOSIS OF THORACOLUMBAR SPINE, UNSPECIFIED SCOLIOSIS TYPE: ICD-10-CM

## 2018-01-04 PROCEDURE — 72148 MRI LUMBAR SPINE W/O DYE: CPT

## 2018-01-04 PROCEDURE — 72080 X-RAY EXAM THORACOLMB 2/> VW: CPT

## 2018-01-04 PROCEDURE — 72146 MRI CHEST SPINE W/O DYE: CPT

## 2018-01-05 ENCOUNTER — TELEPHONE (OUTPATIENT)
Dept: NEUROSURGERY | Facility: CLINIC | Age: 78
End: 2018-01-05

## 2018-01-05 DIAGNOSIS — M54.50 CHRONIC LEFT-SIDED LOW BACK PAIN WITHOUT SCIATICA: ICD-10-CM

## 2018-01-05 DIAGNOSIS — G89.29 CHRONIC LEFT-SIDED LOW BACK PAIN WITHOUT SCIATICA: ICD-10-CM

## 2018-01-05 DIAGNOSIS — M41.9 SCOLIOSIS OF THORACOLUMBAR SPINE, UNSPECIFIED SCOLIOSIS TYPE: Primary | ICD-10-CM

## 2018-01-05 NOTE — TELEPHONE ENCOUNTER
Spoke with patient regarding MRI and XR results. Discussed that she does have severe scoliosis likely causing her pain. Discussed having her schedule appointment to meet with Dr. Elkins vs conservative treatment. She would like to try PT and BRONWYN first to see if she has any improvement. Orders placed for PT and LESI. Patient voiced understanding and agreement.

## 2018-01-10 ENCOUNTER — THERAPY VISIT (OUTPATIENT)
Dept: PHYSICAL THERAPY | Facility: CLINIC | Age: 78
End: 2018-01-10
Payer: COMMERCIAL

## 2018-01-10 DIAGNOSIS — M54.50 LEFT-SIDED LOW BACK PAIN WITHOUT SCIATICA: Primary | ICD-10-CM

## 2018-01-10 PROCEDURE — 97110 THERAPEUTIC EXERCISES: CPT | Mod: GP | Performed by: PHYSICAL THERAPIST

## 2018-01-10 PROCEDURE — 97161 PT EVAL LOW COMPLEX 20 MIN: CPT | Mod: GP | Performed by: PHYSICAL THERAPIST

## 2018-01-10 NOTE — PROGRESS NOTES
[facility] Initial Evaluation  Subjective:  Patient is a 77 year old female presenting with rehab left ankle/foot hpi.                                      Pertinent medical history includes:  Rheumatoid arthritis, osteoarthritis, overweight, high blood pressure and migraines.      Current medications:  Hormone replacement therapy, anti-inflammatory and high blood pressure medication.  Current occupation is retired.    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                                Objective:  System    Physical Exam    General     ROS    Assessment/Plan:

## 2018-01-10 NOTE — PROGRESS NOTES
"Physical Therapy Initial Evaluation  January 10, 2018     Precautions/Restrictions/MD instructions: PT eval and treat.     Subjective:   Date of Onset: 12/24/17  C/C: L-sided LBP wrapping to L lateral abdomen. Denies radiation into LEs or R side. Denies painful C/S/S, B/B changes, peripheral motor deficit, numbness/tingling. Reports she has lost 4\" height over several years, ~2\" over last 2 years. Worse to stand/walk, but also noticed her absolute worst pain is changing her bedsheets (repeatedly bending and twisting to get them in place - must take 2 rest breaks in order to complete). Better with sitting/lying down. Has had back problems for years due to untreated scoliosis. Had BRONWYN 10 years ago that helped tremendously and gave 2 years of relief. Had another 5-6 years ago that didn't help at all. One more scheduled for 2 days from now.    Quality of pain is dull and aching. Pains are described as intermittent in nature. Pain is worse: as day goes on. Pain is rated 8/10 at worst, currently 0/10.   History of symptoms: Pains began gradually as the result of insidious onset. Since onset, symptoms are same.  General health as reported by patient: good  Pertinent medical/surgical history: refer to health history. Imaging: MRI. Current occupational status: Retired. Patient's goals are: decrease pain, improve mobility, exercise at her apartment. Return to MD:  PRN.     Therapist Impression:   Ashanti Jarvis is a 77 year old female with history of L LBP. She presents with signs and symptoms consistent with mechanical LBP w/o radiculopathy. These impairments limit her ability to sit, stand, walk, clean home. Skilled PT services are necessary in order to reduce impairments and improve independent function.    Objective:  LUMBAR EXAMINATION    Posture: Convex L lumbar scoliosis, R convex thoracic. Mildly decreased lumbar lordosis.   Baseline: no symptoms  Active ROM Limitation   Flexion     Jesús? Nil, NE     Extension " "Min/mod, \"better\"    L R   Rotation     Sidebend Nil, NE Nil, NE   Sideglide Nil, NE Nil, NE   RF sitting x10: NE    (* = patient s pain)  Myotomal Strength (MMT) L R   Resisted Hip Flexion (L2) 5 5   Resisted Knee Ext (L3) 5 5   Resisted Ankle DF (L4) 5 5   Resisted Great Toe Ext (L5) 5 5   Resisted Peroneals (S1) 5 5   Resisted Knee Flexion (S1-2) 5 5     Sensory/Reflex Tests: light touch sensation symmetrical for bilateral LEs. Reflexes 1+ bilaterally for Achilles, 2+ for patellar.     Special Tests:  Murray: n/a  Spring Testing: n/a  Glute MMT: 4-/5 bilat    Assessment/Plan:    The patient is a 77 year old female with chief complaint of L LBP.    The patient has the following significant findings with corresponding treatment plan.  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    Pain -  hot/cold therapy, US, electric stimulation, mechanical traction, manual therapy, splint/taping/bracing/orthotics, self management, education, directional preference exercise and home program  Decreased ROM/flexibility - manual therapy, therapeutic exercise and home program  Decreased joint mobility - manual therapy and therapeutic exercise  Decreased strength - therapeutic exercise, therapeutic activities and home program  Impaired muscle performance - neuro re-education  Decreased function - therapeutic activities  Impaired posture - neuro re-education    Therapy Evaluation Codes:   1) History comprised of:   Personal factors that impact the plan of care:      Time since onset of symptoms.    Comorbidity factors that impact the plan of care are:      Scoliosis.     Medications impacting care: None.  2) Examination of Body Systems comprised of:   Body structures and functions that impact the plan of care:      Lumbar spine.   Activity limitations that impact the plan of care are:      Bathing, Bending, Cooking, Lifting, Sitting, Standing and Walking.   Clinical presentation characteristics " are:    Stable/Uncomplicated.  3) Presentation comprised of:   Presentation scored as Low complexity with uncomplicated characteristics..  4) Decision-Making    Low complexity using standardized patient assessment instrument and/or measureable assessment of functional outcome.  Cumulative Therapy Evaluation is: Low complexity.    Previous and current functional limitations:  (See Goal Flow Sheet for this information)    Short term and Long term goals: (See Goal Flow Sheet for this information)     Communication ability:  Patient appears to be able to clearly communicate and understand verbal and written communication and follow directions correctly.  Treatment Explanation - The following has been discussed with the patient: RX ordered/plan of care, anticipated outcomes, and possible risks and side effects.  This patient would benefit from PT intervention to resume normal activities.   Rehab potential is good.    Frequency:  1 X week, once daily  Duration:  for 5 weeks  Discharge Plan: Achieve all LTGs, be independent in home treatment program, and reach maximal therapeutic benefit.    Please refer to the daily flowsheet for treatment today, total treatment time and time spent performing 1:1 timed codes.

## 2018-01-12 ENCOUNTER — HOSPITAL ENCOUNTER (OUTPATIENT)
Facility: CLINIC | Age: 78
Discharge: HOME | End: 2018-01-12
Admitting: PHYSICIAN ASSISTANT
Payer: MEDICARE

## 2018-01-12 ENCOUNTER — HOSPITAL ENCOUNTER (OUTPATIENT)
Dept: GENERAL RADIOLOGY | Facility: CLINIC | Age: 78
End: 2018-01-12
Attending: PHYSICIAN ASSISTANT | Admitting: COLON & RECTAL SURGERY
Payer: MEDICARE

## 2018-01-12 VITALS
RESPIRATION RATE: 16 BRPM | TEMPERATURE: 98.9 F | OXYGEN SATURATION: 96 % | HEART RATE: 69 BPM | DIASTOLIC BLOOD PRESSURE: 87 MMHG | SYSTOLIC BLOOD PRESSURE: 155 MMHG

## 2018-01-12 DIAGNOSIS — M54.50 CHRONIC LEFT-SIDED LOW BACK PAIN WITHOUT SCIATICA: ICD-10-CM

## 2018-01-12 DIAGNOSIS — M41.9 SCOLIOSIS OF THORACOLUMBAR SPINE, UNSPECIFIED SCOLIOSIS TYPE: ICD-10-CM

## 2018-01-12 DIAGNOSIS — G89.29 CHRONIC LEFT-SIDED LOW BACK PAIN WITHOUT SCIATICA: ICD-10-CM

## 2018-01-12 PROCEDURE — 25000125 ZZHC RX 250: Performed by: PHYSICIAN ASSISTANT

## 2018-01-12 PROCEDURE — 25500064 ZZH RX 255 OP 636: Performed by: PHYSICIAN ASSISTANT

## 2018-01-12 PROCEDURE — 40000863 ZZH STATISTIC RADIOLOGY XRAY, US, CT, MAR, NM

## 2018-01-12 PROCEDURE — 27211111 XR LUMBAR TRANSFORAMINAL INJ SINGLE

## 2018-01-12 PROCEDURE — 25000128 H RX IP 250 OP 636: Performed by: PHYSICIAN ASSISTANT

## 2018-01-12 RX ORDER — LIDOCAINE HYDROCHLORIDE 10 MG/ML
30 INJECTION, SOLUTION EPIDURAL; INFILTRATION; INTRACAUDAL; PERINEURAL ONCE
Status: COMPLETED | OUTPATIENT
Start: 2018-01-12 | End: 2018-01-12

## 2018-01-12 RX ORDER — DEXAMETHASONE SODIUM PHOSPHATE 10 MG/ML
20 INJECTION, SOLUTION INTRAMUSCULAR; INTRAVENOUS ONCE
Status: COMPLETED | OUTPATIENT
Start: 2018-01-12 | End: 2018-01-12

## 2018-01-12 RX ORDER — IOPAMIDOL 408 MG/ML
10 INJECTION, SOLUTION INTRATHECAL ONCE
Status: COMPLETED | OUTPATIENT
Start: 2018-01-12 | End: 2018-01-12

## 2018-01-12 RX ADMIN — DEXAMETHASONE SODIUM PHOSPHATE 20 MG: 10 INJECTION, SOLUTION INTRAMUSCULAR; INTRAVENOUS at 13:41

## 2018-01-12 RX ADMIN — IOPAMIDOL 5 ML: 408 INJECTION, SOLUTION INTRATHECAL at 13:37

## 2018-01-12 RX ADMIN — LIDOCAINE HYDROCHLORIDE 6 ML: 10 INJECTION, SOLUTION EPIDURAL; INFILTRATION; INTRACAUDAL; PERINEURAL at 13:41

## 2018-01-12 NOTE — PROGRESS NOTES
Here with daughter for BRONWYN lower back. Pt has had injections before, stated the last one did not work at all. Allergies noted, NPO for 3 hours, No fall risk. Reviewed procedure and d/c instructions, states understanding.

## 2018-01-12 NOTE — DISCHARGE INSTRUCTIONS
Steroid Injection Discharge Instructions     After you go home:      You may resume your normal diet.    Care of Puncture Site:      If you have a bandaid on your puncture site, you may remove it the next morning    You may shower tomorrow    No bath tubs, whirlpools or swimming for at least 3 days     Activity:      You may go back to normal activity in 24 hours    You should let pain be your guide as to the extent of your activities    Maintain any activity limitations as ordered by your provider    Do NOT drive a vehicle until tomorrow    Medicines:      You may resume all medications    Resume your Warfarin/Coumadin at your regular dose today. Follow up with your provider to have your INR rechecked    Resume your Platelet Inhibitors and Aspirin tomorrow at your regular dose    For minor pain, you may take Acetaminophen (Tylenol) or Ibuprofen (Advil)    Pain:       You may experience increased or different pain over the next 24-48 hours    For the next 48 hrs - you may use ice packs for discomfort     Call your primary care doctor if:      You have severe pain that does not improve with pain medication    You have chills or a fever greater than 101 F (38 C)    The site is red, swollen, hot or tender    New problems with your bowel or bladder    Any questions or concerns    Other Instructions:      New numbness down your leg post injection is temporary and may last for up to 6 hours. You may need assistance with activity until your leg has normal sensation.    If you are diabetic, monitor your blood sugar closely. Contact the provider who manages your diabetes to help you control your blood sugar if needed.    For Your Information:      A steroid was injected to help decrease swelling and may help to reduce pain. It may take up to 7-10 days to obtain full results.    Some patients will get lasting relief from a single injection. Others may require up to 3 injections to get results. If you have more than one  steroid injection, they should be given 2 weeks apart.    Side effects of your steroid injection are mild and will go away in 2-3 days  - Insomnia  - Heartburn  - Flushed face  - Water retention  - Increased appetite  - Increased blood sugar      If you have questions call:        [facility] Radiology Dept @ [phone]      The provider who performed your procedure was _________________.

## 2018-01-12 NOTE — IP AVS SNAPSHOT
MRN:[id number]                      After Visit Summary   1/12/2018    Ashanti Jarvis    MRN: [id number]           Visit Information        Department      1/12/2018 12:20 PM [facility]          Review of your medicines      UNREVIEWED medicines. Ask your doctor about these medicines        Dose / Directions    Calcium-Magnesium-Zinc 167-83-8 MG Tabs        Dose:  1 tablet   Take 1 tablet by mouth daily   Refills:  0       cevimeline 30 MG capsule   Commonly known as:  EVOXAC        Dose:  30 mg   Take 30 mg by mouth 3 times daily   Refills:  0       dexamethasone 0.5 MG/5ML Elix        Swish and spit in mouth daily as needed (2 weeks at a time)   Refills:  0       estrogens (conjugated) 0.625 MG tablet   Commonly known as:  PREMARIN   Used for:  Essential hypertension, benign, Menopause        Dose:  0.625 mg   Take 1 tablet (0.625 mg) by mouth daily   Quantity:  90 tablet   Refills:  3       leflunomide 20 MG tablet   Commonly known as:  ARAVA        Dose:  1 tablet   Take 1 tablet by mouth daily   Refills:  0       metoprolol succinate 50 MG 24 hr tablet   Commonly known as:  TOPROL-XL   Used for:  Essential hypertension, benign        Dose:  50 mg   Take 1 tablet (50 mg) by mouth daily   Quantity:  90 tablet   Refills:  3       Multi-vitamin Tabs tablet        Dose:  1 tablet   Take 1 tablet by mouth daily   Quantity:  100 tablet   Refills:  3       PROBIOTIC FORMULA PO        Take  by mouth daily.   Refills:  0       UNABLE TO FIND        MEDICATION NAME: [place] mouthwash swish and spit daily as needed   Refills:  0       Vitamin D (Cholecalciferol) 1000 UNITS Caps        Dose:  1 capsule   Take 1 capsule by mouth daily   Refills:  0                Protect others around you: Learn how to safely use, store and throw away your medicines at www.disposemymeds.org.         Follow-ups after your visit        Your next 10 appointments already scheduled     Jan 12, 2018  1:00  PM CST   XR LUMBAR EPIDURAL INJECTION with SHXR4, SH MSK RAD   [facility] ([facility])    [address] [address] [phone]           For nerve root injection, please send or bring copies of any MRIs or other scans you have had.  Bring a list of your current medicines to your exam. (Include vitamins, minerals and over-the-counter medicines.) Leave your valuables at home.  Plan to have someone drive you home afterward.  Stop taking the following medicines (but talk to your doctor first):   If you take blood thinners, you may need to stop taking them a few days before treatment. Talk to your doctor before stopping these medicines.Stop taking Coumadin (warfarin) 3 days before treatment. Restart the day after treatment.   If you take Plavix, Ticlid, Pletal or Persantine, please ask your doctor if you should stop these medicines. You may need extra tests on the morning of your scan.   If you take Xarelto (Rivaroxaban), you may need to stop taking it 24 hours before treatment. Talk to your doctor before stopping this medicine. Restart the day after treatment.  You may take your other medicines as normal.  Stop all food and drink (including water) 3 hours before your test or treatment.  Please tell the doctor:   If you are allergic to X-ray dye (contrast fluid).   If you may be pregnant.  Injections take about 30 to 45 minutes. Most people spend up to 2 hours in the clinic or hospital.  Please call the Imaging Department at your exam site with any questions.            Jan 17, 2018  3:20 PM CST   MARILYNN Spine with Bang Ramirez PT   [facility] ([facility]  )    [id number]  [place] [facility] [place] [address] [phone]            Jan 24, 2018  3:20 PM CST   MARILYNN Spine with Bang Ramirez PT   [facility] ([facility] [place]  )    [id number]  [place] [address]    [phone]               Care Instructions        Further instructions from your care team       Steroid Injection Discharge Instructions     After you go home:      You may resume your normal diet.    Care of Puncture Site:      If you have a bandaid on your puncture site, you may remove it the next morning    You may shower tomorrow    No bath tubs, whirlpools or swimming for at least 3 days     Activity:      You may go back to normal activity in 24 hours    You should let pain be your guide as to the extent of your activities    Maintain any activity limitations as ordered by your provider    Do NOT drive a vehicle until tomorrow    Medicines:      You may resume all medications    Resume your Warfarin/Coumadin at your regular dose today. Follow up with your provider to have your INR rechecked    Resume your Platelet Inhibitors and Aspirin tomorrow at your regular dose    For minor pain, you may take Acetaminophen (Tylenol) or Ibuprofen (Advil)    Pain:       You may experience increased or different pain over the next 24-48 hours    For the next 48 hrs - you may use ice packs for discomfort     Call your primary care doctor if:      You have severe pain that does not improve with pain medication    You have chills or a fever greater than 101 F (38 C)    The site is red, swollen, hot or tender    New problems with your bowel or bladder    Any questions or concerns    Other Instructions:      New numbness down your leg post injection is temporary and may last for up to 6 hours. You may need assistance with activity until your leg has normal sensation.    If you are diabetic, monitor your blood sugar closely. Contact the provider who manages your diabetes to help you control your blood sugar if needed.    For Your Information:      A steroid was injected to help decrease swelling and may help to reduce pain. It may take up to 7-10 days to obtain full results.    Some patients will get lasting relief from a  single injection. Others may require up to 3 injections to get results. If you have more than one steroid injection, they should be given 2 weeks apart.    Side effects of your steroid injection are mild and will go away in 2-3 days  - Insomnia  - Heartburn  - Flushed face  - Water retention  - Increased appetite  - Increased blood sugar      If you have questions call:        [facility] Radiology Dept @ [phone]      The provider who performed your procedure was _________________.         Additional Information About Your Visit        MyChart Information     [facility] gives you secure access to your electronic health record. If you see a primary care provider, you can also send messages to your care team and make appointments. If you have questions, please call your primary care clinic.  If you do not have a primary care provider, please call [phone] and they will assist you.        Care EveryWhere ID     This is your Care EveryWhere ID. This could be used by other organizations to access your [facility] medical records  [id number]        Your Vitals Were     Blood Pressure Pulse Temperature Respirations Pulse Oximetry       181/92 (BP Location: Right arm) 79 98.9  F (37.2  C) (Oral) 18 98%        Primary Care Provider Office Phone # Fax #    Pamela Davies Kanu,  [phone]      Equal Access to Services     REECE SHERMAN AH: Hadii philip ku hadasho Soomaali, waaxda luqadaha, qaybta kaalmada adeegyada, ozzy webb haycailin nicholas . So [facility] [phone].    ATENCIÓN: Si habla español, tiene a rodriguez disposición servicios gratuitos de asistencia lingüística. Llame al [phone].    We comply with applicable federal civil rights laws and [place] laws. We do not discriminate on the basis of race, color, national origin, age, disability, sex, sexual orientation, or gender identity.            Thank you!     Thank you for choosing [facility] for your care. Our goal is always to provide  you with excellent care. Hearing back from our patients is one way we can continue to improve our services. Please take a few minutes to complete the written survey that you may receive in the mail after you visit with us. Thank you!             Medication List: This is a list of all your medications and when to take them. Check marks below indicate your daily home schedule. Keep this list as a reference.      Medications           Morning Afternoon Evening Bedtime As Needed    Calcium-Magnesium-Zinc 167-83-8 MG Tabs   Take 1 tablet by mouth daily                                cevimeline 30 MG capsule   Commonly known as:  EVOXAC   Take 30 mg by mouth 3 times daily                                dexamethasone 0.5 MG/5ML Elix   Swish and spit in mouth daily as needed (2 weeks at a time)                                estrogens (conjugated) 0.625 MG tablet   Commonly known as:  PREMARIN   Take 1 tablet (0.625 mg) by mouth daily                                leflunomide 20 MG tablet   Commonly known as:  ARAVA   Take 1 tablet by mouth daily                                metoprolol succinate 50 MG 24 hr tablet   Commonly known as:  TOPROL-XL   Take 1 tablet (50 mg) by mouth daily                                Multi-vitamin Tabs tablet   Take 1 tablet by mouth daily                                PROBIOTIC FORMULA PO   Take  by mouth daily.                                UNABLE TO FIND   MEDICATION NAME: [place] mouthwash swish and spit daily as needed                                Vitamin D (Cholecalciferol) 1000 UNITS Caps   Take 1 capsule by mouth daily

## 2018-01-12 NOTE — PROGRESS NOTES
RADIOLOGY PROCEDURE NOTE  Patient name: Ashanti Jarvis  MRN: [id number]  : 1940    Pre-procedure diagnosis: Low back pain  Post-procedure diagnosis: Same    Procedure Date/Time: 2018  1:51 PM  Procedure: Lumbar Transforaminal BRONWYN, Left L4-5  Estimated blood loss: None  Specimen(s) collected with description: none  The patient tolerated the procedure well with no immediate complications.  Significant findings:none    See imaging dictation for procedural details.    Provider name: Masood Patrick  Assistant(s):None

## 2018-01-12 NOTE — IP AVS SNAPSHOT
[place] [address]    [place] [address]    Phone:  [phone]                                       After Visit Summary   1/12/2018    Ashanti Jarvis    MRN: [id number]           After Visit Summary Signature Page     I have received my discharge instructions, and my questions have been answered. I have discussed any challenges I see with this plan with the nurse or doctor.    ..........................................................................................................................................  Patient/Patient Representative Signature      ..........................................................................................................................................  Patient Representative Print Name and Relationship to Patient    ..................................................               ................................................  Date                                            Time    ..........................................................................................................................................  Reviewed by Signature/Title    ...................................................              ..............................................  Date                                                            Time

## 2018-01-12 NOTE — PROGRESS NOTES
Returned from xray, drssg to left buttock CDI, denies pain. Pt standing at bedside without nay numbness or tingling. Getting dressed and ready for d/c.

## 2018-01-12 NOTE — PROGRESS NOTES
1355 Pt returned from radiology. Bandaid CDI to lower left buttocks. Pt denies pain at this time.   1420 OOB - steady on feet. Ambulates w/o difficulty. Bandaid CDI to puncture site.  1430 Pt discharged per w/c to private vehicle. All personal belongings taken with pt.

## 2018-01-17 ENCOUNTER — THERAPY VISIT (OUTPATIENT)
Dept: PHYSICAL THERAPY | Facility: CLINIC | Age: 78
End: 2018-01-17
Payer: COMMERCIAL

## 2018-01-17 DIAGNOSIS — M54.50 LEFT-SIDED LOW BACK PAIN WITHOUT SCIATICA: ICD-10-CM

## 2018-01-17 PROCEDURE — 97110 THERAPEUTIC EXERCISES: CPT | Mod: GP | Performed by: PHYSICAL THERAPIST

## 2018-01-17 PROCEDURE — 97112 NEUROMUSCULAR REEDUCATION: CPT | Mod: GP | Performed by: PHYSICAL THERAPIST

## 2018-01-17 PROCEDURE — 97140 MANUAL THERAPY 1/> REGIONS: CPT | Mod: GP | Performed by: PHYSICAL THERAPIST

## 2018-01-18 ENCOUNTER — TRANSFERRED RECORDS (OUTPATIENT)
Dept: HEALTH INFORMATION MANAGEMENT | Facility: CLINIC | Age: 78
End: 2018-01-18

## 2018-01-18 LAB
ALT SERPL-CCNC: 21 IU/L (ref 5–35)
AST SERPL-CCNC: 22 U/L (ref 5–34)
CREAT SERPL-MCNC: 0.79 MG/DL (ref 0.5–1.3)
GFR SERPL CREATININE-BSD FRML MDRD: 75.4 ML/MIN/1.73M2

## 2018-01-31 ENCOUNTER — THERAPY VISIT (OUTPATIENT)
Dept: PHYSICAL THERAPY | Facility: CLINIC | Age: 78
End: 2018-01-31
Payer: COMMERCIAL

## 2018-01-31 DIAGNOSIS — M54.50 LEFT-SIDED LOW BACK PAIN WITHOUT SCIATICA: ICD-10-CM

## 2018-01-31 PROCEDURE — 97140 MANUAL THERAPY 1/> REGIONS: CPT | Mod: GP | Performed by: PHYSICAL THERAPIST

## 2018-01-31 PROCEDURE — 97112 NEUROMUSCULAR REEDUCATION: CPT | Mod: GP | Performed by: PHYSICAL THERAPIST

## 2018-01-31 PROCEDURE — 97110 THERAPEUTIC EXERCISES: CPT | Mod: GP | Performed by: PHYSICAL THERAPIST

## 2018-02-17 ENCOUNTER — HEALTH MAINTENANCE LETTER (OUTPATIENT)
Age: 78
End: 2018-02-17

## 2018-03-01 PROBLEM — M54.50 LEFT-SIDED LOW BACK PAIN WITHOUT SCIATICA: Status: RESOLVED | Noted: 2018-01-10 | Resolved: 2018-03-01

## 2018-03-01 NOTE — PROGRESS NOTES
Discharge Note    Progress reporting period is from initial eval to Jan 31, 2018.     Ashanti failed to return for next follow up visit and current status is unknown.  Please see information below for last relevant information on current status.  Patient seen for Rxs Used: 3 visits.    SUBJECTIVE  Subjective changes noted by patient:  Subjective: Hasn't noticed any improvement from BRONWYN, though reports PT exercises have helped tremendously. Reports nearly no symptoms over past 2 weeks. Will go 3 weeks independently and return if needed,otherwise continue with HEP. .  Current pain level is Current Pain level: 0/10.     Previous pain level was  Initial Pain level: 8/10 (at worst).   Changes in function:  Yes (See Goal flowsheet attached for changes in current functional level)  Adverse reaction to treatment or activity: None    OBJECTIVE  Changes noted in objective findings: Objective: Lumbar mechanical testing unremarkable for reproduction of pain. Tolerates addition of heelraises and sidepulls to overall strengthening program. .    ASSESSMENT/PLAN  Diagnosis: L mechanical LBP w/o radiculopathy   DIAGP:  The encounter diagnosis was Left-sided low back pain without sciatica.  Updated problem list and treatment plan:     Pain - HEP  Decreased ROM/flexibility - HEP  Decreased function - HEP  Decreased strength - HEP  Impaired muscle performance - HEP    STG/LTGs have been met or progress has been made towards goals:  Yes, please see goal flowsheet for most current information.    Assessment of Progress: current status is unknown.  Last current status: Assessment of progress: Pt is progressing well.  Self Management Plans:  HEP    I have re-evaluated this patient and find that the nature, scope, duration and intensity of the therapy is appropriate for the medical condition of the patient.  Ashanti continues to require the following intervention to meet STG and LTG's:  HEP.    Recommendations:  Discharge with current  home program.  Patient to follow up with MD as needed. Episode to be closed at this time and patient formally discharged from therapy.    Bang Ramirez, PT, DPT, OCS      Please refer to the daily flowsheet for treatment today, total treatment time and time spent performing 1:1 timed codes.

## 2018-03-11 DIAGNOSIS — Z78.0 MENOPAUSE: ICD-10-CM

## 2018-03-11 DIAGNOSIS — I10 ESSENTIAL HYPERTENSION, BENIGN: Chronic | ICD-10-CM

## 2018-03-12 NOTE — TELEPHONE ENCOUNTER
"Last Written Prescription Date:  4/10/2017  Last Fill Quantity: 90,  # refills: 3   Last office visit: 12/22/2017 with prescribing provider:     Future Office Visit:      Requested Prescriptions   Pending Prescriptions Disp Refills     PREMARIN 0.625 MG tablet [Pharmacy Med Name: PREMARIN 0.625MG TABS] 90 tablet 3     Sig: TAKE ONE TABLET BY MOUTH EVERY DAY    Hormone Replacement Therapy Failed    3/11/2018 10:42 AM       Failed - Blood pressure under 140/90 in past 12 months    BP Readings from Last 3 Encounters:   01/12/18 155/87   01/03/18 (!) 171/101   12/22/17 146/82                Passed - Recent (12 mo) or future (30 days) visit within the authorizing provider's specialty    Patient had office visit in the last 12 months or has a visit in the next 30 days with authorizing provider or within the authorizing provider's specialty.  See \"Patient Info\" tab in inbasket, or \"Choose Columns\" in Meds & Orders section of the refill encounter.           Passed - Patient has mammogram in past 2 years on file if age 50-75       Passed - Patient is 18 years of age or older       Passed - No active pregnancy on record       Passed - No positive pregnancy test on record in past 12 months          "

## 2018-03-13 RX ORDER — CONJUGATED ESTROGENS 0.62 MG/1
TABLET, FILM COATED ORAL
Qty: 90 TABLET | Refills: 0 | Status: SHIPPED | OUTPATIENT
Start: 2018-03-13 | End: 2018-06-27

## 2018-06-20 ENCOUNTER — APPOINTMENT (OUTPATIENT)
Dept: CT IMAGING | Facility: CLINIC | Age: 78
End: 2018-06-20
Attending: EMERGENCY MEDICINE
Payer: MEDICARE

## 2018-06-20 ENCOUNTER — HOSPITAL ENCOUNTER (EMERGENCY)
Facility: CLINIC | Age: 78
Discharge: HOME | End: 2018-06-20
Attending: EMERGENCY MEDICINE | Admitting: EMERGENCY MEDICINE
Payer: MEDICARE

## 2018-06-20 VITALS
HEART RATE: 76 BPM | TEMPERATURE: 98.1 F | SYSTOLIC BLOOD PRESSURE: 129 MMHG | RESPIRATION RATE: 16 BRPM | WEIGHT: 145 LBS | BODY MASS INDEX: 25.69 KG/M2 | HEIGHT: 63 IN | OXYGEN SATURATION: 93 % | DIASTOLIC BLOOD PRESSURE: 65 MMHG

## 2018-06-20 DIAGNOSIS — R10.84 ABDOMINAL PAIN, GENERALIZED: ICD-10-CM

## 2018-06-20 DIAGNOSIS — R11.2 NON-INTRACTABLE VOMITING WITH NAUSEA, UNSPECIFIED VOMITING TYPE: ICD-10-CM

## 2018-06-20 LAB
ALBUMIN SERPL-MCNC: 3.6 G/DL (ref 3.4–5)
ALBUMIN UR-MCNC: 30 MG/DL
ALP SERPL-CCNC: 60 U/L (ref 40–150)
ALT SERPL W P-5'-P-CCNC: 27 U/L (ref 0–50)
ANION GAP SERPL CALCULATED.3IONS-SCNC: 11 MMOL/L (ref 3–14)
APPEARANCE UR: ABNORMAL
APTT PPP: 26 SEC (ref 22–37)
AST SERPL W P-5'-P-CCNC: 24 U/L (ref 0–45)
BASOPHILS # BLD AUTO: 0 10E9/L (ref 0–0.2)
BASOPHILS NFR BLD AUTO: 0.4 %
BILIRUB SERPL-MCNC: 0.6 MG/DL (ref 0.2–1.3)
BILIRUB UR QL STRIP: NEGATIVE
BUN SERPL-MCNC: 15 MG/DL (ref 7–30)
CALCIUM SERPL-MCNC: 8.8 MG/DL (ref 8.5–10.1)
CHLORIDE SERPL-SCNC: 101 MMOL/L (ref 94–109)
CO2 SERPL-SCNC: 25 MMOL/L (ref 20–32)
COLOR UR AUTO: ABNORMAL
CREAT SERPL-MCNC: 0.62 MG/DL (ref 0.52–1.04)
DIFFERENTIAL METHOD BLD: ABNORMAL
EOSINOPHIL # BLD AUTO: 0 10E9/L (ref 0–0.7)
EOSINOPHIL NFR BLD AUTO: 0.1 %
ERYTHROCYTE [DISTWIDTH] IN BLOOD BY AUTOMATED COUNT: 13 % (ref 10–15)
GFR SERPL CREATININE-BSD FRML MDRD: >90 ML/MIN/1.7M2
GLUCOSE SERPL-MCNC: 153 MG/DL (ref 70–99)
GLUCOSE UR STRIP-MCNC: 30 MG/DL
HCT VFR BLD AUTO: 43.7 % (ref 35–47)
HGB BLD-MCNC: 14.7 G/DL (ref 11.7–15.7)
HGB UR QL STRIP: NEGATIVE
IMM GRANULOCYTES # BLD: 0 10E9/L (ref 0–0.4)
IMM GRANULOCYTES NFR BLD: 0.1 %
INR PPP: 1.07 (ref 0.86–1.14)
INTERPRETATION ECG - MUSE: NORMAL
KETONES UR STRIP-MCNC: 40 MG/DL
LEUKOCYTE ESTERASE UR QL STRIP: NEGATIVE
LIPASE SERPL-CCNC: 77 U/L (ref 73–393)
LYMPHOCYTES # BLD AUTO: 0.6 10E9/L (ref 0.8–5.3)
LYMPHOCYTES NFR BLD AUTO: 8.2 %
MAGNESIUM SERPL-MCNC: 1.6 MG/DL (ref 1.6–2.3)
MCH RBC QN AUTO: 30.5 PG (ref 26.5–33)
MCHC RBC AUTO-ENTMCNC: 33.6 G/DL (ref 31.5–36.5)
MCV RBC AUTO: 91 FL (ref 78–100)
MONOCYTES # BLD AUTO: 0.1 10E9/L (ref 0–1.3)
MONOCYTES NFR BLD AUTO: 1.8 %
MUCOUS THREADS #/AREA URNS LPF: PRESENT /LPF
NEUTROPHILS # BLD AUTO: 6.3 10E9/L (ref 1.6–8.3)
NEUTROPHILS NFR BLD AUTO: 89.4 %
NITRATE UR QL: NEGATIVE
NRBC # BLD AUTO: 0 10*3/UL
NRBC BLD AUTO-RTO: 0 /100
PH UR STRIP: 8 PH (ref 5–7)
PLATELET # BLD AUTO: 135 10E9/L (ref 150–450)
POTASSIUM SERPL-SCNC: 3.8 MMOL/L (ref 3.4–5.3)
PROT SERPL-MCNC: 7.3 G/DL (ref 6.8–8.8)
RBC # BLD AUTO: 4.82 10E12/L (ref 3.8–5.2)
RBC #/AREA URNS AUTO: 1 /HPF (ref 0–2)
SODIUM SERPL-SCNC: 137 MMOL/L (ref 133–144)
SOURCE: ABNORMAL
SP GR UR STRIP: 1.01 (ref 1–1.03)
SQUAMOUS #/AREA URNS AUTO: 1 /HPF (ref 0–1)
UROBILINOGEN UR STRIP-MCNC: NORMAL MG/DL (ref 0–2)
WBC # BLD AUTO: 7 10E9/L (ref 4–11)
WBC #/AREA URNS AUTO: 0 /HPF (ref 0–5)

## 2018-06-20 PROCEDURE — 93005 ELECTROCARDIOGRAM TRACING: CPT

## 2018-06-20 PROCEDURE — 83735 ASSAY OF MAGNESIUM: CPT | Performed by: EMERGENCY MEDICINE

## 2018-06-20 PROCEDURE — 81001 URINALYSIS AUTO W/SCOPE: CPT | Performed by: EMERGENCY MEDICINE

## 2018-06-20 PROCEDURE — 96375 TX/PRO/DX INJ NEW DRUG ADDON: CPT

## 2018-06-20 PROCEDURE — 25000132 ZZH RX MED GY IP 250 OP 250 PS 637: Mod: GY | Performed by: EMERGENCY MEDICINE

## 2018-06-20 PROCEDURE — 83690 ASSAY OF LIPASE: CPT | Performed by: EMERGENCY MEDICINE

## 2018-06-20 PROCEDURE — 85730 THROMBOPLASTIN TIME PARTIAL: CPT | Performed by: EMERGENCY MEDICINE

## 2018-06-20 PROCEDURE — 96376 TX/PRO/DX INJ SAME DRUG ADON: CPT

## 2018-06-20 PROCEDURE — 85025 COMPLETE CBC W/AUTO DIFF WBC: CPT | Performed by: EMERGENCY MEDICINE

## 2018-06-20 PROCEDURE — A9270 NON-COVERED ITEM OR SERVICE: HCPCS | Mod: GY | Performed by: EMERGENCY MEDICINE

## 2018-06-20 PROCEDURE — 85610 PROTHROMBIN TIME: CPT | Performed by: EMERGENCY MEDICINE

## 2018-06-20 PROCEDURE — 25000128 H RX IP 250 OP 636: Performed by: EMERGENCY MEDICINE

## 2018-06-20 PROCEDURE — 25000125 ZZHC RX 250: Performed by: EMERGENCY MEDICINE

## 2018-06-20 PROCEDURE — 96374 THER/PROPH/DIAG INJ IV PUSH: CPT | Mod: 59

## 2018-06-20 PROCEDURE — 74177 CT ABD & PELVIS W/CONTRAST: CPT

## 2018-06-20 PROCEDURE — 96361 HYDRATE IV INFUSION ADD-ON: CPT

## 2018-06-20 PROCEDURE — 99285 EMERGENCY DEPT VISIT HI MDM: CPT | Mod: 25

## 2018-06-20 PROCEDURE — 80053 COMPREHEN METABOLIC PANEL: CPT | Performed by: EMERGENCY MEDICINE

## 2018-06-20 RX ORDER — IOPAMIDOL 755 MG/ML
73 INJECTION, SOLUTION INTRAVASCULAR ONCE
Status: COMPLETED | OUTPATIENT
Start: 2018-06-20 | End: 2018-06-20

## 2018-06-20 RX ORDER — ALUMINA, MAGNESIA, AND SIMETHICONE 2400; 2400; 240 MG/30ML; MG/30ML; MG/30ML
30 SUSPENSION ORAL ONCE
Status: COMPLETED | OUTPATIENT
Start: 2018-06-20 | End: 2018-06-20

## 2018-06-20 RX ORDER — ONDANSETRON 4 MG/1
4 TABLET, ORALLY DISINTEGRATING ORAL EVERY 8 HOURS PRN
Qty: 20 TABLET | Refills: 0 | Status: SHIPPED | OUTPATIENT
Start: 2018-06-20 | End: 2018-06-23

## 2018-06-20 RX ORDER — MORPHINE SULFATE 4 MG/ML
4 INJECTION, SOLUTION INTRAMUSCULAR; INTRAVENOUS
Status: DISCONTINUED | OUTPATIENT
Start: 2018-06-20 | End: 2018-06-20 | Stop reason: HOSPADM

## 2018-06-20 RX ORDER — ONDANSETRON 2 MG/ML
4 INJECTION INTRAMUSCULAR; INTRAVENOUS EVERY 30 MIN PRN
Status: DISCONTINUED | OUTPATIENT
Start: 2018-06-20 | End: 2018-06-20 | Stop reason: HOSPADM

## 2018-06-20 RX ORDER — HYDROCODONE BITARTRATE AND ACETAMINOPHEN 5; 325 MG/1; MG/1
1 TABLET ORAL EVERY 6 HOURS PRN
Qty: 10 TABLET | Refills: 0 | Status: SHIPPED | OUTPATIENT
Start: 2018-06-20 | End: 2018-07-20

## 2018-06-20 RX ADMIN — SODIUM CHLORIDE 62 ML: 9 INJECTION, SOLUTION INTRAVENOUS at 06:15

## 2018-06-20 RX ADMIN — FAMOTIDINE 20 MG: 10 INJECTION INTRAVENOUS at 07:51

## 2018-06-20 RX ADMIN — ALUMINUM HYDROXIDE, MAGNESIUM HYDROXIDE, AND DIMETHICONE 30 ML: 400; 400; 40 SUSPENSION ORAL at 07:50

## 2018-06-20 RX ADMIN — ONDANSETRON 4 MG: 2 INJECTION INTRAMUSCULAR; INTRAVENOUS at 06:46

## 2018-06-20 RX ADMIN — MORPHINE SULFATE 4 MG: 4 INJECTION, SOLUTION INTRAMUSCULAR; INTRAVENOUS at 04:14

## 2018-06-20 RX ADMIN — SODIUM CHLORIDE, POTASSIUM CHLORIDE, SODIUM LACTATE AND CALCIUM CHLORIDE 1000 ML: 600; 310; 30; 20 INJECTION, SOLUTION INTRAVENOUS at 04:07

## 2018-06-20 RX ADMIN — ONDANSETRON 4 MG: 2 INJECTION INTRAMUSCULAR; INTRAVENOUS at 04:11

## 2018-06-20 RX ADMIN — IOPAMIDOL 73 ML: 755 INJECTION, SOLUTION INTRAVENOUS at 06:15

## 2018-06-20 ASSESSMENT — ENCOUNTER SYMPTOMS
BACK PAIN: 0
ABDOMINAL PAIN: 1
COUGH: 0
SHORTNESS OF BREATH: 0
FEVER: 0
DIARRHEA: 0
FREQUENCY: 0
DYSURIA: 0
VOMITING: 1
ABDOMINAL DISTENTION: 1
NAUSEA: 1
CONSTIPATION: 0
HEMATURIA: 0
BLOOD IN STOOL: 0

## 2018-06-20 NOTE — ED AVS SNAPSHOT
Emergency Department    [address] [address]    Phone:  [phone]    Fax:  [phone]                                       Ashanti Jarvis   MRN: [id number]    Department:   Emergency Department   Date of Visit:  6/20/2018           After Visit Summary Signature Page     I have received my discharge instructions, and my questions have been answered. I have discussed any challenges I see with this plan with the nurse or doctor.    ..........................................................................................................................................  Patient/Patient Representative Signature      ..........................................................................................................................................  Patient Representative Print Name and Relationship to Patient    ..................................................               ................................................  Date                                            Time    ..........................................................................................................................................  Reviewed by Signature/Title    ...................................................              ..............................................  Date                                                            Time

## 2018-06-20 NOTE — DISCHARGE INSTRUCTIONS
YOUR ABDOMINAL PAIN MAY BE CAUSED BY GASTRITIS (IRRITATION OF THE STOMACH) OR A POSSIBLE STOMACH ULCER. AVOID NON-STEROIDAL ANTI-INFLAMMATORY MEDICATIONS (NSAIDS) SUCH AS IBUPROFEN, MOTRIN, ADVIL, ALEVE, NAPROXEN AS THIS CAN MAKE YOUR SYMPTOMS WORSE. FOLLOW UP CLOSELY WITH YOUR PRIMARY CARE PHYSICIAN FOR CLOSE FOLLOW UP.    Discharge Instructions  Abdominal Pain    Abdominal pain (belly pain) can be caused by many things. Your evaluation today does not show the exact cause for your pain. Your provider today has decided that it is unlikely your pain is due to a life threatening problem, or a problem requiring surgery or hospital admission. Sometimes those problems cannot be found right away, so it is very important that you follow up as directed.  Sometimes only the changes which occur over time allow the cause of your pain to be found.    Generally, every Emergency Department visit should have a follow-up clinic visit with either a primary or a specialty clinic/provider. Please follow-up as instructed by your emergency provider today. With abdominal pain, we often recommend very close follow-up, such as the following day.    ADULTS:  Return to the Emergency Department right away if:      You get an oral temperature above 102oF or as directed by your provider.    You have blood in your stools. This may be bright red or appear as black, tarry stools.      You keep vomiting (throwing up) or cannot drink liquids.    You see blood when you vomit.     You cannot have a bowel movement or you cannot pass gas.    Your stomach gets bloated or bigger.    Your skin or the whites of your eyes look yellow.    You faint.    You have bloody, frequent or painful urination (peeing).    You have new symptoms or anything that worries you.    CHILDREN:  Return to the Emergency Department right away if your child has any of the above-listed symptoms or the following:      Pushes your hand away or screams/cries when his/her belly is  touched.    You notice your child is very fussy or weak.    Your child is very tired and is too tired to eat or drink.    Your child is dehydrated.  Signs of dehydration can be:  o Significant change in the amount of wet diapers/urine.  o Your infant or child starts to have dry mouth and lips, or no saliva (spit) or tears.    PREGNANT WOMEN:  Return to the Emergency Department right away if you have any of the above-listed symptoms or the following:      You have bleeding, leaking fluid or passing tissue from the vagina.    You have worse pain or cramping, or pain in your shoulder or back.    You have vomiting that will not stop.    You have a temperature of 100oF or more.    Your baby is not moving as much as usual.    You faint.    You get a bad headache with or without eye problems and abdominal pain.    You have a seizure.    You have unusual discharge from your vagina and abdominal pain.    Abdominal pain is pretty common during pregnancy.  Your pain may or may not be related to your pregnancy. You should follow-up closely with your OB provider so they can evaluate you and your baby.  Until you follow-up with your regular provider, do the following:       Avoid sex and do not put anything in your vagina.    Drink clear fluids.    Only take medications approved by your provider.    MORE INFORMATION:    Appendicitis:  A possible cause of abdominal pain in any person who still has their appendix is acute appendicitis. Appendicitis is often hard to diagnose.  Testing does not always rule out early appendicitis or other causes of abdominal pain. Close follow-up with your provider and re-evaluations may be needed to figure out the reason for your abdominal pain.    Follow-up:  It is very important that you make an appointment with your clinic and go to the appointment.  If you do not follow-up with your primary provider, it may result in missing an important development which could result in permanent injury or  "disability and/or lasting pain.  If there is any problem keeping your appointment, call your provider or return to the Emergency Department.    Medications:  Take your medications as directed by your provider today.  Before using over-the-counter medications, ask your provider and make sure to take the medications as directed.  If you have any questions about medications, ask your provider.    Diet:  Resume your normal diet as much as possible, but do not eat fried, fatty or spicy foods while you have pain.  Do not drink alcohol or have caffeine.  Do not smoke tobacco.    Probiotics: If you have been given an antibiotic, you may want to also take a probiotic pill or eat yogurt with live cultures. Probiotics have \"good bacteria\" to help your intestines stay healthy. Studies have shown that probiotics help prevent diarrhea (loose stools) and other intestine problems (including C. diff infection) when you take antibiotics. You can buy these without a prescription in the pharmacy section of the store.     If you were given a prescription for medicine here today, be sure to read all of the information (including the package insert) that comes with your prescription.  This will include important information about the medicine, its side effects, and any warnings that you need to know about.  The pharmacist who fills the prescription can provide more information and answer questions you may have about the medicine.  If you have questions or concerns that the pharmacist cannot address, please call or return to the Emergency Department.       Remember that you can always come back to the Emergency Department if you are not able to see your regular provider in the amount of time listed above, if you get any new symptoms, or if there is anything that worries you.    "

## 2018-06-20 NOTE — ED PROVIDER NOTES
History     Chief Complaint:  Abdominal Pain     HPI   Ashanti Jarvis is a 77 year old female with a history of rheumatoid arthritis and hypertension who presents for evaluation of abdominal pain. On 6/19/2018 around 1300, the patient started to develop constant dull cramping pain across her upper abdomen with associated nausea and abdominal distension. She cannot identify any exacerbating or alleviating factors for her pain. Around 1600, the patient started to develop frequent non-bloody vomiting. Due to her persistent pain this morning, the patient came into the ED for evaluation. Currently in the ED, the patient rates her pain at a severity of 10/10, and she has never experienced similar pain. Notably, the patient has had two non-bloody non-diarrheal bowel movements since her pain began. Otherwise, she has not had any fever, chest pain, cough, shortness of breath, dysuria, hematuria, urinary frequency, vaginal bleeding, vaginal discharge, or back pain recently. She has a surgical history notable for a partial hysterectomy.     Allergies:  Vancomycin      Medications:    Calcium-Magnesium-Zinc 167-83-8 MG TABS  cevimeline (EVOXAC) 30 MG capsule  dexamethasone 0.5 MG/5ML ELIX  leflunomide (ARAVA) 20 MG tablet  metoprolol (TOPROL-XL) 50 MG 24 hr tablet  multivitamin, therapeutic with minerals (MULTI-VITAMIN) TABS  PREMARIN 0.625 MG tablet  Probiotic Product (PROBIOTIC FORMULA PO)  Vitamin D, Cholecalciferol, 1000 UNITS CAPS    Past Medical History:    Benign neoplasm of colon  Hypertension  Generalized anxiety disorder   Essential tremor  Intracranial abscess  Lichen planus  Migraine headaches  Osteoporosis   Other isolated or specific phobias  Other kyphoscoliosis and scoliosis  Pemphigoid, benign mucous membrane  Personal history of urinary calculi  Rheumatoid arthritis  Sjogren's syndrome  Unspecified tinnitus  Urge urinary incontinence     Past Surgical History:    Appendectomy  Colonoscopy  Endoscopic  "balloon sinuplasty acclarent   Hysterectomy, vaginal   Keratotomy arcuate with femtosecond laser/imaging for atiol   Optical tracking system craniotomy, excise tumor, combined   Phacoemulsification clear cornea with standard intraocular lens implant, left   Phacoemulsification clear cornea with standard intraocular lens implant, right     Family History:    Osteoporosis - Mother  Cancer, lung - Father     Social History:  Tobacco use:    Former smoker - 0.10 packs / day for 1 year, quit 1962   Alcohol use:    Positive   Marital status:       Accompanied to ED by:  Family member      Review of Systems   Constitutional: Negative for fever.   Respiratory: Negative for cough and shortness of breath.    Cardiovascular: Negative for chest pain.   Gastrointestinal: Positive for abdominal distention, abdominal pain, nausea and vomiting. Negative for blood in stool, constipation and diarrhea.   Genitourinary: Negative for dysuria, frequency, hematuria, vaginal bleeding and vaginal discharge.   Musculoskeletal: Negative for back pain.   All other systems reviewed and are negative.    Physical Exam   First Vitals:  BP: (!) 173/98  Pulse: 76  Temp: 98.1  F (36.7  C)  Resp: 16  Height: 160 cm (5' 3\")  Weight: 65.8 kg (145 lb)  SpO2: 96 %      Physical Exam  General: Nontoxic. Awake, alert.   Head:  Scalp, face, and head appear normal  Eyes:  Pupils are equal, round, and reactive to light    Conjunctivae non-injected and sclerae white  ENT:    The external nose is normal    Pinnae are normal    The oropharynx is normal, mucous membranes moist    Posterior pharynx clear without swelling, exudates or erythema     Uvula is in the midline  Neck:  Normal range of motion    There is no rigidity noted    Trachea is in the midline  CV:  Regular rate and rhythm     Normal S1/S2, no S3/S4    No murmur or rub  Resp:  Lungs are clear and equal bilaterally    There is no tachypnea    No increased work of breathing    No rales, " wheezing, or rhonchi  GI:  Abdomen is soft, no rigidity or guarding    No distension, or mass    Diffuse upper abdominal tenderness. No rebound tenderness. Mart sign negative.   MS:  Normal muscular tone    Symmetric motor strength    No lower extremity edema  Skin:  No rash or acute skin lesions noted  Neuro:  Awake and alert    Speech is normal and fluent    Moves all extremities spontaneously  Psych: Normal affect.  Appropriate interactions.     Emergency Department Course   ECG (3:31:50):  Indication: Screening for cardiovascular disease.   Rate 73 bpm. HI interval 166 ms. QRS duration 70 ms. QT/QTc 370/407 ms. P-R-T axes 64 45 40.   Interpretation: Normal sinus rhythm with sinus arrhythmia, Possible left atrial enlargement, Septal infarct age undetermined, ST & T wave abnormality consider lateral ischemia, Abnormal ECG  Agree with computer interpretation. Yes  No significant change compared to EKG dated 12/10/12.   Interpreted at 0343 by Dr. Maloney.      Imaging:  Radiographic findings were communicated with the patient and family who voiced understanding of the findings.    CT Abdomen Pelvis w Contrast:  IMPRESSION:  1. No acute abnormality. No bowel obstruction or inflammation.  2. Colonic diverticula without acute diverticulitis.  3. Small hiatal hernia.  Preliminary report per radiology.      Laboratory:  CBC:  low, o/w WNL (WBC 7.0, HGB 14.7)    CMP: Glucose 153 high, o/w WNL (Creatinine 0.62)  Magnesium: 1.6   INR: 1.07   Partial thromboplastin time: 26   Lipase: 77   UA Reflex to Microscopic and Culture: GLC 30, Urineketon 40, pH 8.0 high, Protein albumin 30, Mucous present, o/w Negative     Interventions:  0407 Lactated ringers 1,000 mL IV   0411 Zofran 4 mg IV   0414 Morphine 4 mg IV  0646 Zofran 4 mg IV   Pepcid 20 mg IV   Mylanta ES/Maalox ES 30 mL PO     Emergency Department Course:  Nursing notes and vitals reviewed.  0346: I performed an exam of the patient as documented above.     0638: I  updated and reassessed the patient.     Findings and plan explained to the Patient. Patient discharged home with instructions regarding supportive care, medications, and reasons to return. The importance of close follow-up was reviewed. The patient was prescribed Norco, Zofran ODT, and Zantac.       Impression & Plan      Medical Decision Making:  Ashanti Jarvis is a 77 year old female who presents with abdominal pain.  A broad differential diagnosis was considered including PUD vs gastritis vs pancreatitis vs perforated viscus vs bilary colic vs obstruction. Also considered are UTI, pyelonephritis, kidney stones. Patient has prior appendectomy.  Lipase is normal so I doubt pancreatitis.  No RUQ pain or elevated of biliary or liver enzymes so I doubt biliary colic. CT abd/pelvis without acute findings that would explain her symptoms.    The workup in the ED is at this point negative.  No etiology for the patients pain is found at this point and my suspicion of an intraabdominal catastrophe or other worrisome etiology is very low. She felt improved after the above interventions. Her symptoms may be related to gastritis/PUD and therefore I would start her on zantac and antacids PRN. Patient is hemodynamically stable in ED. Very close follow up with her PCP was recommended. Return precautions were discussed with patient. The patient's questions were answered and the patient was agreeable with discharge.     Diagnosis:    ICD-10-CM   1. Abdominal pain, generalized R10.84   2. Non-intractable vomiting with nausea, unspecified vomiting type R11.2       Disposition:  Discharged to home with Norco, Zofran ODT, and Zantac.     Discharge Medications:  New Prescriptions    HYDROCODONE-ACETAMINOPHEN (NORCO) 5-325 MG PER TABLET    Take 1 tablet by mouth every 6 hours as needed for severe pain    ONDANSETRON (ZOFRAN ODT) 4 MG ODT TAB    Take 1 tablet (4 mg) by mouth every 8 hours as needed for nausea or vomiting     RANITIDINE (ZANTAC) 150 MG TABLET    Take 1 tablet (150 mg) by mouth 2 times daily for 10 days         IJake, am serving as a scribe at 3:46 AM on 6/20/2018 to document services personally performed by Dr. Maloney, based on my observations and the provider's statements to me.     EMERGENCY DEPARTMENT       Wilmar Maloney MD  06/21/18 3634

## 2018-06-20 NOTE — ED AVS SNAPSHOT
Emergency Department    [address] [facility] [address]    Phone:  [phone]    Fax:  [phone]                                       Ashanti Jarvis   MRN: [id number]    Department:   Emergency Department   Date of Visit:  6/20/2018           Patient Information     Date Of Birth          1940        Your diagnoses for this visit were:     Abdominal pain, generalized     Non-intractable vomiting with nausea, unspecified vomiting type        You were seen by Wilmar Maloney MD.      Follow-up Information     Follow up with Pamela Bobby DO. Schedule an appointment as soon as possible for a visit in 2 days.    Specialty:  Internal Medicine    Why:  For close follow up    Contact information:    [address] [address] [phone]          Go to  Emergency Department.    Specialty:  EMERGENCY MEDICINE    Why:  If symptoms worsen    Contact information:    [address] [facility] [address] [phone]        Discharge Instructions       YOUR ABDOMINAL PAIN MAY BE CAUSED BY GASTRITIS (IRRITATION OF THE STOMACH) OR A POSSIBLE STOMACH ULCER. AVOID NON-STEROIDAL ANTI-INFLAMMATORY MEDICATIONS (NSAIDS) SUCH AS IBUPROFEN, MOTRIN, ADVIL, ALEVE, NAPROXEN AS THIS CAN MAKE YOUR SYMPTOMS WORSE. FOLLOW UP CLOSELY WITH YOUR PRIMARY CARE PHYSICIAN FOR CLOSE FOLLOW UP.    Discharge Instructions  Abdominal Pain    Abdominal pain (belly pain) can be caused by many things. Your evaluation today does not show the exact cause for your pain. Your provider today has decided that it is unlikely your pain is due to a life threatening problem, or a problem requiring surgery or hospital admission. Sometimes those problems cannot be found right away, so it is very important that you follow up as directed.  Sometimes only the changes which occur over time allow the cause of your pain to be found.    Generally, every Emergency Department visit should have a  follow-up clinic visit with either a primary or a specialty clinic/provider. Please follow-up as instructed by your emergency provider today. With abdominal pain, we often recommend very close follow-up, such as the following day.    ADULTS:  Return to the Emergency Department right away if:      You get an oral temperature above 102oF or as directed by your provider.    You have blood in your stools. This may be bright red or appear as black, tarry stools.      You keep vomiting (throwing up) or cannot drink liquids.    You see blood when you vomit.     You cannot have a bowel movement or you cannot pass gas.    Your stomach gets bloated or bigger.    Your skin or the whites of your eyes look yellow.    You faint.    You have bloody, frequent or painful urination (peeing).    You have new symptoms or anything that worries you.    CHILDREN:  Return to the Emergency Department right away if your child has any of the above-listed symptoms or the following:      Pushes your hand away or screams/cries when his/her belly is touched.    You notice your child is very fussy or weak.    Your child is very tired and is too tired to eat or drink.    Your child is dehydrated.  Signs of dehydration can be:  o Significant change in the amount of wet diapers/urine.  o Your infant or child starts to have dry mouth and lips, or no saliva (spit) or tears.    PREGNANT WOMEN:  Return to the Emergency Department right away if you have any of the above-listed symptoms or the following:      You have bleeding, leaking fluid or passing tissue from the vagina.    You have worse pain or cramping, or pain in your shoulder or back.    You have vomiting that will not stop.    You have a temperature of 100oF or more.    Your baby is not moving as much as usual.    You faint.    You get a bad headache with or without eye problems and abdominal pain.    You have a seizure.    You have unusual discharge from your vagina and abdominal  "pain.    Abdominal pain is pretty common during pregnancy.  Your pain may or may not be related to your pregnancy. You should follow-up closely with your OB provider so they can evaluate you and your baby.  Until you follow-up with your regular provider, do the following:       Avoid sex and do not put anything in your vagina.    Drink clear fluids.    Only take medications approved by your provider.    MORE INFORMATION:    Appendicitis:  A possible cause of abdominal pain in any person who still has their appendix is acute appendicitis. Appendicitis is often hard to diagnose.  Testing does not always rule out early appendicitis or other causes of abdominal pain. Close follow-up with your provider and re-evaluations may be needed to figure out the reason for your abdominal pain.    Follow-up:  It is very important that you make an appointment with your clinic and go to the appointment.  If you do not follow-up with your primary provider, it may result in missing an important development which could result in permanent injury or disability and/or lasting pain.  If there is any problem keeping your appointment, call your provider or return to the Emergency Department.    Medications:  Take your medications as directed by your provider today.  Before using over-the-counter medications, ask your provider and make sure to take the medications as directed.  If you have any questions about medications, ask your provider.    Diet:  Resume your normal diet as much as possible, but do not eat fried, fatty or spicy foods while you have pain.  Do not drink alcohol or have caffeine.  Do not smoke tobacco.    Probiotics: If you have been given an antibiotic, you may want to also take a probiotic pill or eat yogurt with live cultures. Probiotics have \"good bacteria\" to help your intestines stay healthy. Studies have shown that probiotics help prevent diarrhea (loose stools) and other intestine problems (including C. diff " infection) when you take antibiotics. You can buy these without a prescription in the pharmacy section of the store.     If you were given a prescription for medicine here today, be sure to read all of the information (including the package insert) that comes with your prescription.  This will include important information about the medicine, its side effects, and any warnings that you need to know about.  The pharmacist who fills the prescription can provide more information and answer questions you may have about the medicine.  If you have questions or concerns that the pharmacist cannot address, please call or return to the Emergency Department.       Remember that you can always come back to the Emergency Department if you are not able to see your regular provider in the amount of time listed above, if you get any new symptoms, or if there is anything that worries you.      24 Hour Appointment Hotline       To make an appointment at any [facility], call [phone] ([phone]). If you don't have a family doctor or clinic, we will help you find one. [facility] clinics are conveniently located to serve the needs of you and your family.             Review of your medicines      START taking        Dose / Directions Last dose taken    HYDROcodone-acetaminophen 5-325 MG per tablet   Commonly known as:  NORCO   Dose:  1 tablet   Quantity:  10 tablet        Take 1 tablet by mouth every 6 hours as needed for severe pain   Refills:  0        ondansetron 4 MG ODT tab   Commonly known as:  ZOFRAN ODT   Dose:  4 mg   Quantity:  20 tablet        Take 1 tablet (4 mg) by mouth every 8 hours as needed for nausea or vomiting   Refills:  0        ranitidine 150 MG tablet   Commonly known as:  ZANTAC   Dose:  150 mg   Quantity:  20 tablet        Take 1 tablet (150 mg) by mouth 2 times daily for 10 days   Refills:  0          Our records show that you are taking the medicines listed below. If these are incorrect, please  call your family doctor or clinic.        Dose / Directions Last dose taken    Calcium-Magnesium-Zinc 167-83-8 MG Tabs   Dose:  1 tablet        Take 1 tablet by mouth daily   Refills:  0        cevimeline 30 MG capsule   Commonly known as:  EVOXAC   Dose:  30 mg        Take 30 mg by mouth 3 times daily   Refills:  0        dexamethasone 0.5 MG/5ML Elix        Swish and spit in mouth daily as needed (2 weeks at a time)   Refills:  0        leflunomide 20 MG tablet   Commonly known as:  ARAVA   Dose:  1 tablet        Take 1 tablet by mouth daily   Refills:  0        metoprolol succinate 50 MG 24 hr tablet   Commonly known as:  TOPROL-XL   Dose:  50 mg   Quantity:  90 tablet        Take 1 tablet (50 mg) by mouth daily   Refills:  3        Multi-vitamin Tabs tablet   Dose:  1 tablet   Quantity:  100 tablet        Take 1 tablet by mouth daily   Refills:  3        PREMARIN 0.625 MG tablet   Quantity:  90 tablet   Generic drug:  estrogens (conjugated)        TAKE ONE TABLET BY MOUTH EVERY DAY   Refills:  0        PROBIOTIC FORMULA PO        Take  by mouth daily.   Refills:  0        UNABLE TO FIND        MEDICATION NAME: Ana Rosa mouthwash swish and spit daily as needed   Refills:  0        Vitamin D (Cholecalciferol) 1000 units Caps   Dose:  1 capsule        Take 1 capsule by mouth daily   Refills:  0                Information about OPIOIDS     PRESCRIPTION OPIOIDS: WHAT YOU NEED TO KNOW   We gave you an opioid (narcotic) pain medicine. It is important to manage your pain, but opioids are not always the best choice. You should first try all the other options your care team gave you. Take this medicine for as short a time (and as few doses) as possible.     These medicines have risks:    DO NOT drive when on new or higher doses of pain medicine. These medicines can affect your alertness and reaction times, and you could be arrested for driving under the influence (DUI). If you need to use opioids long-term, talk to your  care team about driving.    DO NOT operate heave machinery    DO NOT do any other dangerous activities while taking these medicines.     DO NOT drink any alcohol while taking these medicines.      If the opioid prescribed includes acetaminophen, DO NOT take with any other medicines that contain acetaminophen. Read all labels carefully. Look for the word  acetaminophen  or  Tylenol.  Ask your pharmacist if you have questions or are unsure.    You can get addicted to pain medicines, especially if you have a history of addiction (chemical, alcohol or substance dependence). Talk to your care team about ways to reduce this risk.    Store your pills in a secure place, locked if possible. We will not replace any lost or stolen medicine. If you don t finish your medicine, please throw away (dispose) as directed by your pharmacist. The [place] Pollution [employer] Agency has more information about safe disposal: https://www.pca.[facility].[place].us/living-green/managing-unwanted-medications.     All opioids tend to cause constipation. Drink plenty of water and eat foods that have a lot of fiber, such as fruits, vegetables, prune juice, apple juice and high-fiber cereal. Take a laxative (Miralax, milk of magnesia, Colace, Senna) if you don t move your bowels at least every other day.         Prescriptions were sent or printed at these locations (3 Prescriptions)                   Other Prescriptions                Printed at Department/Unit printer (3 of 3)         HYDROcodone-acetaminophen (NORCO) 5-325 MG per tablet               ondansetron (ZOFRAN ODT) 4 MG ODT tab               ranitidine (ZANTAC) 150 MG tablet                Procedures and tests performed during your visit     CBC with platelets differential    CT Abdomen Pelvis w Contrast    Comprehensive metabolic panel    EKG 12 lead    Give 20 ounces of water 15 minutes before CT of abdomen    INR    Lipase    Magnesium    Partial thromboplastin time    Peripheral IV catheter     UA reflex to Microscopic and Culture      Orders Needing Specimen Collection     None      Pending Results     Date and Time Order Name Status Description    6/20/2018 0357 CT Abdomen Pelvis w Contrast Preliminary             Pending Culture Results     No orders found from 6/18/2018 to 6/21/2018.            Pending Results Instructions     If you had any lab results that were not finalized at the time of your Discharge, you can call the ED Lab Result RN at [phone]. You will be contacted by this team for any positive Lab results or changes in treatment. The nurses are available 7 days a week from 10A to 6:30P.  You can leave a message 24 hours per day and they will return your call.        Test Results From Your Hospital Stay        6/20/2018  4:36 AM      Component Results     Component Value Ref Range & Units Status    WBC 7.0 4.0 - 11.0 10e9/L Final    RBC Count 4.82 3.8 - 5.2 10e12/L Final    Hemoglobin 14.7 11.7 - 15.7 g/dL Final    Hematocrit 43.7 35.0 - 47.0 % Final    MCV 91 78 - 100 fl Final    MCH 30.5 26.5 - 33.0 pg Final    MCHC 33.6 31.5 - 36.5 g/dL Final    RDW 13.0 10.0 - 15.0 % Final    Platelet Count 135 (L) 150 - 450 10e9/L Final    Diff Method Automated Method  Final    % Neutrophils 89.4 % Final    % Lymphocytes 8.2 % Final    % Monocytes 1.8 % Final    % Eosinophils 0.1 % Final    % Basophils 0.4 % Final    % Immature Granulocytes 0.1 % Final    Nucleated RBCs 0 0 /100 Final    Absolute Neutrophil 6.3 1.6 - 8.3 10e9/L Final    Absolute Lymphocytes 0.6 (L) 0.8 - 5.3 10e9/L Final    Absolute Monocytes 0.1 0.0 - 1.3 10e9/L Final    Absolute Eosinophils 0.0 0.0 - 0.7 10e9/L Final    Absolute Basophils 0.0 0.0 - 0.2 10e9/L Final    Abs Immature Granulocytes 0.0 0 - 0.4 10e9/L Final    Absolute Nucleated RBC 0.0  Final         6/20/2018  4:55 AM      Component Results     Component Value Ref Range & Units Status    INR 1.07 0.86 - 1.14 Final         6/20/2018  4:55 AM      Component Results      Component Value Ref Range & Units Status    PTT 26 22 - 37 sec Final         6/20/2018  4:53 AM      Component Results     Component Value Ref Range & Units Status    Sodium 137 133 - 144 mmol/L Final    Potassium 3.8 3.4 - 5.3 mmol/L Final    Chloride 101 94 - 109 mmol/L Final    Carbon Dioxide 25 20 - 32 mmol/L Final    Anion Gap 11 3 - 14 mmol/L Final    Glucose 153 (H) 70 - 99 mg/dL Final    Urea Nitrogen 15 7 - 30 mg/dL Final    Creatinine 0.62 0.52 - 1.04 mg/dL Final    GFR Estimate >90 >60 mL/min/1.7m2 Final    Non  GFR Calc    GFR Estimate If Black >90 >60 mL/min/1.7m2 Final    African American GFR Calc    Calcium 8.8 8.5 - 10.1 mg/dL Final    Bilirubin Total 0.6 0.2 - 1.3 mg/dL Final    Albumin 3.6 3.4 - 5.0 g/dL Final    Protein Total 7.3 6.8 - 8.8 g/dL Final    Alkaline Phosphatase 60 40 - 150 U/L Final    ALT 27 0 - 50 U/L Final    AST 24 0 - 45 U/L Final         6/20/2018  4:50 AM      Component Results     Component Value Ref Range & Units Status    Lipase 77 73 - 393 U/L Final         6/20/2018  5:37 AM      Component Results     Component Value Ref Range & Units Status    Color Urine Light Yellow  Final    Appearance Urine Slightly Cloudy  Final    Glucose Urine 30 (A) NEG^Negative mg/dL Final    Bilirubin Urine Negative NEG^Negative Final    Ketones Urine 40 (A) NEG^Negative mg/dL Final    Specific Gravity Urine 1.013 1.003 - 1.035 Final    Blood Urine Negative NEG^Negative Final    pH Urine 8.0 (H) 5.0 - 7.0 pH Final    Protein Albumin Urine 30 (A) NEG^Negative mg/dL Final    Urobilinogen mg/dL Normal 0.0 - 2.0 mg/dL Final    Nitrite Urine Negative NEG^Negative Final    Leukocyte Esterase Urine Negative NEG^Negative Final    Source Midstream Urine  Final    RBC Urine 1 0 - 2 /HPF Final    WBC Urine 0 0 - 5 /HPF Final    Squamous Epithelial /HPF Urine 1 0 - 1 /HPF Final    Mucous Urine Present (A) NEG^Negative /LPF Final         6/20/2018  6:35 AM      Narrative     CT ABDOMEN  PELVIS W CONTRAST  6/20/2018 6:17 AM     HISTORY: Right lower quadrant pain. Vomiting.     TECHNIQUE: CT abdomen and pelvis with 73 mL Isovue-370 IV. Radiation  dose for this scan was reduced using automated exposure control,  adjustment of the mA and/or kV according to patient size, or iterative  reconstruction technique.    COMPARISON: None.    FINDINGS:  Abdomen: Atelectasis and scarring at the lung bases. Small hiatal  hernia. There are two small probable cysts in the right lobe of the  liver. Low attenuation subcentimeter liver lesion(s) compatible with  benign cysts or other benign lesions. No specific evaluation or  follow-up is recommended in a low risk patient. The spleen,  gallbladder, pancreas, adrenal glands and right kidney are normal in  appearance.    There are three small low attenuation left renal cortical lesions  which are likely cysts. Low attenuation subcentimeter renal lesion(s).  These are compatible with small benign cysts and no specific imaging  evaluation or follow-up is recommended.    There is no abdominal or pelvic lymph node enlargement. There are  atherosclerotic calcifications of the aorta and its branches. No  aneurysm.    Pelvis: The uterus is absent. There is a small left adnexal cyst  measuring 1.4 cm. There are colonic diverticula without acute  diverticulitis. There is no bowel obstruction or inflammation. The  appendix is normal. No free intraperitoneal gas or fluid. There is  degenerative disease and scoliosis in the spine. A few pelvic  phleboliths.        Impression     IMPRESSION:  1. No acute abnormality. No bowel obstruction or inflammation.  2. Colonic diverticula without acute diverticulitis.  3. Small hiatal hernia.         6/20/2018  4:50 AM      Component Results     Component Value Ref Range & Units Status    Magnesium 1.6 1.6 - 2.3 mg/dL Final                Clinical Quality Measure: Blood Pressure Screening     Your blood pressure was checked while you were in  the emergency department today. The last reading we obtained was  BP: 138/75 . Please read the guidelines below about what these numbers mean and what you should do about them.  If your systolic blood pressure (the top number) is less than 120 and your diastolic blood pressure (the bottom number) is less than 80, then your blood pressure is normal. There is nothing more that you need to do about it.  If your systolic blood pressure (the top number) is 120-139 or your diastolic blood pressure (the bottom number) is 80-89, your blood pressure may be higher than it should be. You should have your blood pressure rechecked within a year by a primary care provider.  If your systolic blood pressure (the top number) is 140 or greater or your diastolic blood pressure (the bottom number) is 90 or greater, you may have high blood pressure. High blood pressure is treatable, but if left untreated over time it can put you at risk for heart attack, stroke, or kidney failure. You should have your blood pressure rechecked by a primary care provider within the next 4 weeks.  If your provider in the emergency department today gave you specific instructions to follow-up with your doctor or provider even sooner than that, you should follow that instruction and not wait for up to 4 weeks for your follow-up visit.        Thank you for choosing [facility]       Thank you for choosing [facility] for your care. Our goal is always to provide you with excellent care. Hearing back from our patients is one way we can continue to improve our services. Please take a few minutes to complete the written survey that you may receive in the mail after you visit with us. Thank you!        [facility] [employer] Information     [employer] gives you secure access to your electronic health record. If you see a primary care provider, you can also send messages to your care team and make appointments. If you have questions, please call your primary care clinic.  If you do not  have a primary care provider, please call [phone] and they will assist you.        Care EveryWhere ID     This is your Care EveryWhere ID. This could be used by other organizations to access your [facility] medical records  [id number]        Equal Access to Services     REECE SHERMAN : Arley Ambrocio, clementina crenshaw, dariusz savage, ozzy pfeiffer. So [facility] [phone].    ATENCIÓN: Si habla español, tiene a rodriguez disposición servicios gratuitos de asistencia lingüística. Llame al [phone].    We comply with applicable federal civil rights laws and [place] laws. We do not discriminate on the basis of race, color, national origin, age, disability, sex, sexual orientation, or gender identity.            After Visit Summary       This is your record. Keep this with you and show to your community pharmacist(s) and doctor(s) at your next visit.

## 2018-06-22 NOTE — TELEPHONE ENCOUNTER
Reason for Call:  Medication or medication refill:    Do you use a [facility]?  Name of the pharmacy and phone number for the current request:       [id number] [place] - [address]      Name of the medication requested: ranitidine (ZANTAC) 150 MG tablet    Other request: please refill she only got 20 in the hospital     Can we leave a detailed message on this number? YES    Phone number patient can be reached at: Home number on file [phone] (home)    Best Time: anytime    Call taken on 6/22/2018 at 10:59 AM by Nadine Morris

## 2018-06-22 NOTE — TELEPHONE ENCOUNTER
Routing refill request to provider for review/approval because:  RX'd in ED 6-20-18 #20 tabs.  Otherwise last RX 2012.   Patient has ED follow up with Dr Can next week.  Entered into notes--review [facility].     Mary REYEZ RN,BSN

## 2018-06-22 NOTE — TELEPHONE ENCOUNTER
"Requested Prescriptions   Pending Prescriptions Disp Refills     ranitidine (ZANTAC) 150 MG tablet 20 tablet 0    Last Written Prescription Date:  6/30/18  Last Fill Quantity: 20,  # refills: 0   Last office visit: 12/22/2017 with prescribing provider:     Future Office Visit:   Next 5 appointments (look out 90 days)     Jun 27, 2018  2:20 PM CDT   [facility]ale [facility]   [facility]    [address] [address]   [phone]                  Sig: Take 1 tablet (150 mg) by mouth 2 times daily    H2 Blockers Protocol Passed    6/22/2018 11:27 AM       Passed - Patient is age 12 or older       Passed - Recent (12 mo) or future (30 days) visit within the authorizing provider's specialty    Patient had office visit in the last 12 months or has a visit in the next 30 days with authorizing provider or within the authorizing provider's specialty.  See \"Patient Info\" tab in inbasket, or \"Choose Columns\" in Meds & Orders section of the refill encounter.              "

## 2018-06-22 NOTE — TELEPHONE ENCOUNTER
Per ER notes they only wanted a 10 day supply to trial (which will get her to her apt with Dr. Can)

## 2018-06-25 NOTE — TELEPHONE ENCOUNTER
Denied RX. Pt filled #10 supply from ER. Pt has appt on 6/27. Please discuss prescriptions at that time.    See PCP note below.    Juan Pablo WHALEY RN

## 2018-06-27 ENCOUNTER — OFFICE VISIT (OUTPATIENT)
Dept: FAMILY MEDICINE | Facility: CLINIC | Age: 78
End: 2018-06-27
Payer: COMMERCIAL

## 2018-06-27 VITALS
DIASTOLIC BLOOD PRESSURE: 89 MMHG | WEIGHT: 146 LBS | BODY MASS INDEX: 25.87 KG/M2 | HEART RATE: 84 BPM | SYSTOLIC BLOOD PRESSURE: 141 MMHG | TEMPERATURE: 97.4 F | OXYGEN SATURATION: 95 % | HEIGHT: 63 IN

## 2018-06-27 DIAGNOSIS — I10 ESSENTIAL HYPERTENSION, BENIGN: Chronic | ICD-10-CM

## 2018-06-27 DIAGNOSIS — Z78.0 MENOPAUSE: ICD-10-CM

## 2018-06-27 DIAGNOSIS — K21.9 GASTROESOPHAGEAL REFLUX DISEASE, ESOPHAGITIS PRESENCE NOT SPECIFIED: Primary | ICD-10-CM

## 2018-06-27 PROCEDURE — 99214 OFFICE O/P EST MOD 30 MIN: CPT | Performed by: INTERNAL MEDICINE

## 2018-06-27 RX ORDER — METOPROLOL SUCCINATE 50 MG/1
50 TABLET, EXTENDED RELEASE ORAL DAILY
Qty: 90 TABLET | Refills: 3 | Status: SHIPPED | OUTPATIENT
Start: 2018-06-27 | End: 2019-06-21

## 2018-06-27 NOTE — PROGRESS NOTES
SUBJECTIVE:   Ashanti Jarvis is a 77 year old female who presents to clinic today for the following health issues:      ED/UC Followup:    Facility:   Emergency Department  Date of visit: 06/20/2018  Reason for visit: GERD, Non-intractable vomiting with nausea, unspecified vomiting type  Current Status: Stable         HPI:   Patient Ashanti Jarvis is a very pleasant 77 year old female with history of menopause, GERD who presents to Internal Medicine clinic today for post ER discharge follow up of recent abdominal pains, nausea and vomiting symptoms. Regarding the patient's chronic hypertension, the patient is due for a refill of metoprolol BP medication at this time. Regarding the patient's GERD symptoms, the patient reports that her GERD symptoms are stable on the Zantac medication and she is due for a refill of her Zantac medication at this time. Patient denies any further new nausea or vomiting episodes. Patient is also due for a refill of her chronic premarin medication for menopause. Patient denies any chest pain, headaches, fever or chills.      Current Medications:     Current Outpatient Prescriptions   Medication Sig Dispense Refill     Calcium-Magnesium-Zinc 167-83-8 MG TABS Take 1 tablet by mouth daily       cevimeline (EVOXAC) 30 MG capsule Take 30 mg by mouth 3 times daily        dexamethasone 0.5 MG/5ML ELIX Swish and spit in mouth daily as needed (2 weeks at a time)        estrogens, conjugated, (PREMARIN) 0.625 MG tablet Take 1 tablet (0.625 mg) by mouth daily 90 tablet 0     HYDROcodone-acetaminophen (NORCO) 5-325 MG per tablet Take 1 tablet by mouth every 6 hours as needed for severe pain 10 tablet 0     leflunomide (ARAVA) 20 MG tablet Take 1 tablet by mouth daily       metoprolol succinate (TOPROL-XL) 50 MG 24 hr tablet Take 1 tablet (50 mg) by mouth daily 90 tablet 3     multivitamin, therapeutic with minerals (MULTI-VITAMIN) TABS Take 1 tablet by mouth daily 100 tablet 3      "Probiotic Product (PROBIOTIC FORMULA PO) Take  by mouth daily.       ranitidine (ZANTAC) 150 MG tablet Take 1 tablet (150 mg) by mouth 2 times daily 180 tablet 3     UNABLE TO FIND MEDICATION NAME: Ana Rosa mouthwash swish and spit daily as needed       Vitamin D, Cholecalciferol, 1000 UNITS CAPS Take 1 capsule by mouth daily           Allergies:      Allergies   Allergen Reactions     Vancomycin Rash     \"like lizard skin all over my body\"            Past Medical History:     Past Medical History:   Diagnosis Date     Benign neoplasm of colon 5/01    tubular adenoma; one non-adenomatous polyp 2012 - rec f/u 5 years     Essential hypertension, benign      Essential tremor      Hx estrogen therapy     Since hysterectomy (estimates ~30 years as of Sept 2014)     Intracranial abscess Dec 2012    H/o sinusitis and intracranial abscess     Lichen planus      Migraine, unspecified, without mention of intractable migraine without mention of status migrainosus 1963     Osteoporosis     Reclast 10/11 --> drug holiday 2015 per rheum     Other isolated or specific phobias     Claustrophobia     Other kyphoscoliosis and scoliosis      Pemphigoid, benign mucous membrane      Personal history of urinary calculi      Rheumatoid arthritis(714.0) 1991    Followed by Dr Vandana Merino at [facility]     Sjogren's syndrome (H)     due to meds     Unspecified tinnitus     Left ear     Urge urinary incontinence          Past Surgical History:     Past Surgical History:   Procedure Laterality Date     APPENDECTOMY       C NONSPECIFIC PROCEDURE  1987    pneumonia     COLONOSCOPY N/A 5/25/2017    Procedure: COLONOSCOPY;  colonoscopy;  Surgeon: Alan Crenshaw MD;  Location:  GI     ENDOSCOPIC BALLOON SINUPLASTY ACCLARENT  12/11/2012    Procedure: ENDOSCOPIC BALLOON SINUPLASTY ACCLARENT;  JOSHUA. MAXILLO SINUS SURGERY,  ;  Surgeon: David Ortega MD;  Location:  OR     HYSTERECTOMY, VAGINAL  1983    has ovaries; " secondary to fibroids     KERATOTOMY ARCUATE WITH FEMTOSECOND LASER/IMAGING FOR ATIOL Right 1/4/2016    Procedure: KERATOTOMY ARCUATE WITH FEMTOSECOND LASER/IMAGING FOR ATIOL;  Surgeon: Larry Vilchis MD;  Location:  EC     OPTICAL TRACKING SYSTEM CRANIOTOMY, EXCISE TUMOR, COMBINED  12/12/2012    Procedure: COMBINED OPTICAL TRACKING SYSTEM CRANIOTOMY, EXCISE TUMOR;  LEFT FRONTAL CRANIOTOMY, IMAGE GUIDANCE FROM FERMIN PALENCIA. - SUPINE, CLAUS MABRY;  Surgeon: Tomi Fernandez MD;  Location:  OR     PHACOEMULSIFICATION CLEAR CORNEA WITH STANDARD INTRAOCULAR LENS IMPLANT Left 12/21/2015    Procedure: PHACOEMULSIFICATION CLEAR CORNEA WITH STANDARD INTRAOCULAR LENS IMPLANT;  Surgeon: Larry Vilchis MD;  Location:  EC     PHACOEMULSIFICATION CLEAR CORNEA WITH STANDARD INTRAOCULAR LENS IMPLANT Right 1/4/2016    Procedure: PHACOEMULSIFICATION CLEAR CORNEA WITH STANDARD INTRAOCULAR LENS IMPLANT;  Surgeon: Larry Vilchis MD;  Location:  EC         Family Medical History:     Family History   Problem Relation Age of Onset     Osteoperosis Mother      Cancer Father      Lung CA          Social History:     Social History     Social History     Marital status:      Spouse name: N/A     Number of children: 2     Years of education: N/A     Occupational History     Not on file.     Social History Main Topics     Smoking status: Former Smoker     Packs/day: 0.10     Years: 1.00     Types: Cigarettes     Quit date: 1/1/1962     Smokeless tobacco: Never Used      Comment: tried in college     Alcohol use 1.8 oz/week     3 Standard drinks or equivalent per week      Comment:  abt 3 glasses wine per week      Drug use: No     Sexual activity: Not Currently     Partners: Male     Other Topics Concern     Not on file     Social History Narrative           Review of System:     Constitutional: Negative for fever or chills  Skin: Negative for rashes  Ears/Nose/Throat: Negative for nasal congestion,  "sore throat  Respiratory: No shortness of breath, dyspnea on exertion, cough, or hemoptysis  Cardiovascular: Negative for chest pain  Gastrointestinal: Negative for nausea, vomiting, positive for GERD  Genitourinary: Negative for dysuria, hematuria  Musculoskeletal: Negative for myalgias  Neurologic: Negative for headaches  Psychiatric: Negative for depression, anxiety  Hematologic/Lymphatic/Immunologic: Negative  Endocrine: Negative  Behavioral: Negative for tobacco use       Physical Exam:   /89 (BP Location: Right arm, Cuff Size: Adult Regular)  Pulse 84  Temp 97.4  F (36.3  C) (Oral)  Ht 5' 3\" (1.6 m)  Wt 146 lb (66.2 kg)  SpO2 95%  BMI 25.86 kg/m2    GENERAL: alert and no distress  EYES: eyes grossly normal to inspection, and conjunctivae and sclerae normal  HENT: Normocephalic atraumatic. Nose and mouth without ulcers or lesions  NECK: supple  RESP: lungs clear to auscultation   CV: regular rate and rhythm, normal S1 S2  LYMPH: no peripheral edema   ABDOMEN: nondistended  MS: no gross musculoskeletal defects noted  SKIN: no suspicious lesions or rashes  NEURO: Alert & Oriented x 3.   PSYCH: mentation appears normal, affect normal        Diagnostic Test Results:     Diagnostic Test Results:  Results for orders placed or performed during the hospital encounter of 06/20/18   CT Abdomen Pelvis w Contrast    Narrative    CT ABDOMEN PELVIS W CONTRAST  6/20/2018 6:17 AM     HISTORY: Right lower quadrant pain. Vomiting.     TECHNIQUE: CT abdomen and pelvis with 73 mL Isovue-370 IV. Radiation  dose for this scan was reduced using automated exposure control,  adjustment of the mA and/or kV according to patient size, or iterative  reconstruction technique.    COMPARISON: None.    FINDINGS:  Abdomen: Atelectasis and scarring at the lung bases. Small hiatal  hernia. There are two small probable cysts in the right lobe of the  liver. Low attenuation subcentimeter liver lesion(s) compatible with  benign cysts or " other benign lesions. No specific evaluation or  follow-up is recommended in a low risk patient. The spleen,  gallbladder, pancreas, adrenal glands and right kidney are normal in  appearance.    There are three small low attenuation left renal cortical lesions  which are likely cysts. Low attenuation subcentimeter renal lesion(s).  These are compatible with small benign cysts and no specific imaging  evaluation or follow-up is recommended.    There is no abdominal or pelvic lymph node enlargement. There are  atherosclerotic calcifications of the aorta and its branches. No  aneurysm.    Pelvis: The uterus is absent. There is a small left adnexal cyst  measuring 1.4 cm. There are colonic diverticula without acute  diverticulitis. There is no bowel obstruction or inflammation. The  appendix is normal. No free intraperitoneal gas or fluid. There is  degenerative disease and scoliosis in the spine. A few pelvic  phleboliths.      Impression    IMPRESSION:  1. No acute abnormality. No bowel obstruction or inflammation.  2. Colonic diverticula without acute diverticulitis.  3. Small hiatal hernia.    FLAKO TALBERT MD   CBC with platelets differential   Result Value Ref Range    WBC 7.0 4.0 - 11.0 10e9/L    RBC Count 4.82 3.8 - 5.2 10e12/L    Hemoglobin 14.7 11.7 - 15.7 g/dL    Hematocrit 43.7 35.0 - 47.0 %    MCV 91 78 - 100 fl    MCH 30.5 26.5 - 33.0 pg    MCHC 33.6 31.5 - 36.5 g/dL    RDW 13.0 10.0 - 15.0 %    Platelet Count 135 (L) 150 - 450 10e9/L    Diff Method Automated Method     % Neutrophils 89.4 %    % Lymphocytes 8.2 %    % Monocytes 1.8 %    % Eosinophils 0.1 %    % Basophils 0.4 %    % Immature Granulocytes 0.1 %    Nucleated RBCs 0 0 /100    Absolute Neutrophil 6.3 1.6 - 8.3 10e9/L    Absolute Lymphocytes 0.6 (L) 0.8 - 5.3 10e9/L    Absolute Monocytes 0.1 0.0 - 1.3 10e9/L    Absolute Eosinophils 0.0 0.0 - 0.7 10e9/L    Absolute Basophils 0.0 0.0 - 0.2 10e9/L    Abs Immature Granulocytes 0.0 0 - 0.4 10e9/L     Absolute Nucleated RBC 0.0    INR   Result Value Ref Range    INR 1.07 0.86 - 1.14   Partial thromboplastin time   Result Value Ref Range    PTT 26 22 - 37 sec   Comprehensive metabolic panel   Result Value Ref Range    Sodium 137 133 - 144 mmol/L    Potassium 3.8 3.4 - 5.3 mmol/L    Chloride 101 94 - 109 mmol/L    Carbon Dioxide 25 20 - 32 mmol/L    Anion Gap 11 3 - 14 mmol/L    Glucose 153 (H) 70 - 99 mg/dL    Urea Nitrogen 15 7 - 30 mg/dL    Creatinine 0.62 0.52 - 1.04 mg/dL    GFR Estimate >90 >60 mL/min/1.7m2    GFR Estimate If Black >90 >60 mL/min/1.7m2    Calcium 8.8 8.5 - 10.1 mg/dL    Bilirubin Total 0.6 0.2 - 1.3 mg/dL    Albumin 3.6 3.4 - 5.0 g/dL    Protein Total 7.3 6.8 - 8.8 g/dL    Alkaline Phosphatase 60 40 - 150 U/L    ALT 27 0 - 50 U/L    AST 24 0 - 45 U/L   Lipase   Result Value Ref Range    Lipase 77 73 - 393 U/L   UA reflex to Microscopic and Culture   Result Value Ref Range    Color Urine Light Yellow     Appearance Urine Slightly Cloudy     Glucose Urine 30 (A) NEG^Negative mg/dL    Bilirubin Urine Negative NEG^Negative    Ketones Urine 40 (A) NEG^Negative mg/dL    Specific Gravity Urine 1.013 1.003 - 1.035    Blood Urine Negative NEG^Negative    pH Urine 8.0 (H) 5.0 - 7.0 pH    Protein Albumin Urine 30 (A) NEG^Negative mg/dL    Urobilinogen mg/dL Normal 0.0 - 2.0 mg/dL    Nitrite Urine Negative NEG^Negative    Leukocyte Esterase Urine Negative NEG^Negative    Source Midstream Urine     RBC Urine 1 0 - 2 /HPF    WBC Urine 0 0 - 5 /HPF    Squamous Epithelial /HPF Urine 1 0 - 1 /HPF    Mucous Urine Present (A) NEG^Negative /LPF   Magnesium   Result Value Ref Range    Magnesium 1.6 1.6 - 2.3 mg/dL   EKG 12 lead   Result Value Ref Range    Interpretation ECG Click View Image link to view waveform and result        ASSESSMENT/PLAN:       (K21.9) Gastroesophageal reflux disease, esophagitis presence not specified  (primary encounter diagnosis)  Comment: chronic GERD symptoms well controlled on  Zantac medication  Plan: I have refilled the patient's ranitidine (ZANTAC) 150 MG tablet GERD medication today. Patient declined a referral for Upper EGD endoscopy at this time for further evaluation of her chronic GERD symptoms.        (I10) Essential hypertension, benign  Comment: patient's BP is stable and patient is due for a refill of metoprolol BP medicaiton  Plan: I have refilled the patient's metoprolol succinate (TOPROL-XL) 50 MG 24 hr tablet medication today.      (Z78.0) Menopause  Comment: patient is due for a refill of Premarin medication  Plan: I have ordered refill of estrogens, conjugated, (PREMARIN) 0.625 MG tablet medication today.            Follow Up Plan:     Patient is instructed to return to Internal Medicine clinic for follow-up visit in 1 month.        Chantale Can MD  Internal Medicine  [facility]

## 2018-06-27 NOTE — MR AVS SNAPSHOT
"              After Visit Summary   6/27/2018    Ashanti Jarvis    MRN: [id number]           Patient Information     Date Of Birth          1940        Visit Information        Provider Department      6/27/2018 2:20 PM Chantale Can MD [facility]        Today's Diagnoses     Gastroesophageal reflux disease, esophagitis presence not specified    -  1    Essential hypertension, benign        Menopause           Follow-ups after your visit        Who to contact     If you have questions or need follow up information about today's clinic visit or your schedule please contact [facility] directly at [phone].  Normal or non-critical lab and imaging results will be communicated to you by MyChart, letter or phone within 4 business days after the clinic has received the results. If you do not hear from us within 7 days, please contact the clinic through Twiliohart or phone. If you have a critical or abnormal lab result, we will notify you by phone as soon as possible.  Submit refill requests through GoChime or call your pharmacy and they will forward the refill request to us. Please allow 3 business days for your refill to be completed.          Additional Information About Your Visit        MyChart Information     GoChime gives you secure access to your electronic health record. If you see a primary care provider, you can also send messages to your care team and make appointments. If you have questions, please call your primary care clinic.  If you do not have a primary care provider, please call [phone] and they will assist you.        Care EveryWhere ID     This is your Care EveryWhere ID. This could be used by other organizations to access your [place] medical records  [id number]        Your Vitals Were     Pulse Temperature Height Pulse Oximetry BMI (Body Mass Index)       84 97.4  F (36.3  C) (Oral) 5' 3\" (1.6 m) 95% 25.86 kg/m2        Blood Pressure from Last 3 " Encounters:   06/27/18 141/89   06/20/18 129/65   01/12/18 155/87    Weight from Last 3 Encounters:   06/27/18 146 lb (66.2 kg)   06/20/18 145 lb (65.8 kg)   01/03/18 146 lb (66.2 kg)              Today, you had the following     No orders found for display         Today's Medication Changes          These changes are accurate as of 6/27/18 11:59 PM.  If you have any questions, ask your nurse or doctor.               Start taking these medicines.        Dose/Directions    ranitidine 150 MG tablet   Commonly known as:  ZANTAC   Used for:  Gastroesophageal reflux disease, esophagitis presence not specified   Started by:  Chantale Can MD        Dose:  150 mg   Take 1 tablet (150 mg) by mouth 2 times daily   Quantity:  180 tablet   Refills:  3         These medicines have changed or have updated prescriptions.        Dose/Directions    estrogens (conjugated) 0.625 MG tablet   Commonly known as:  PREMARIN   This may have changed:  See the new instructions.   Used for:  Essential hypertension, benign, Menopause   Changed by:  Chantale Can MD        Dose:  0.625 mg   Take 1 tablet (0.625 mg) by mouth daily   Quantity:  90 tablet   Refills:  0            Where to get your medicines      These medications were sent to [facility] - [place], [place] - [address] [place] [address]  [address] [facility] [address], [facility] [address]     Phone:  [phone]     estrogens (conjugated) 0.625 MG tablet    metoprolol succinate 50 MG 24 hr tablet         Some of these will need a paper prescription and others can be bought over the counter.  Ask your nurse if you have questions.     Bring a paper prescription for each of these medications     ranitidine 150 MG tablet                Primary Care Provider Office Phone # Fax #    Pamela Bobby [phone] [address]   [facility] [address]        Equal Access to Services     REECE SHERMAN AH: clementina Engle  den claritajorgestephanie manasozzy adams. So [facility] [place] [phone].    ATENCIÓN: Si venus pulido, tiene a rodriguez disposición servicios gratuitos de asistencia lingüística. Berkley al [phone].    We comply with applicable federal civil rights laws and [place] laws. We do not discriminate on the basis of race, color, national origin, age, disability, sex, sexual orientation, or gender identity.            Thank you!     Thank you for choosing [facility]  for your care. Our goal is always to provide you with excellent care. Hearing back from our patients is one way we can continue to improve our services. Please take a few minutes to complete the written survey that you may receive in the mail after your visit with us. Thank you!             Your Updated Medication List - Protect others around you: Learn how to safely use, store and throw away your medicines at www.disposemymeds.org.          This list is accurate as of 6/27/18 11:59 PM.  Always use your most recent med list.                   Brand Name Dispense Instructions for use Diagnosis    Calcium-Magnesium-Zinc 167-83-8 MG Tabs      Take 1 tablet by mouth daily        cevimeline 30 MG capsule    EVOXAC     Take 30 mg by mouth 3 times daily        dexamethasone 0.5 MG/5ML Elix      Swish and spit in mouth daily as needed (2 weeks at a time)        estrogens (conjugated) 0.625 MG tablet    PREMARIN    90 tablet    Take 1 tablet (0.625 mg) by mouth daily    Essential hypertension, benign, Menopause       HYDROcodone-acetaminophen 5-325 MG per tablet    NORCO    10 tablet    Take 1 tablet by mouth every 6 hours as needed for severe pain        leflunomide 20 MG tablet    ARAVA     Take 1 tablet by mouth daily        metoprolol succinate 50 MG 24 hr tablet    TOPROL-XL    90 tablet    Take 1 tablet (50 mg) by mouth daily    Essential hypertension, benign       Multi-vitamin Tabs tablet     100 tablet    Take 1 tablet  by mouth daily        PROBIOTIC FORMULA PO      Take  by mouth daily.        ranitidine 150 MG tablet    ZANTAC    180 tablet    Take 1 tablet (150 mg) by mouth 2 times daily    Gastroesophageal reflux disease, esophagitis presence not specified       UNABLE TO FIND      MEDICATION NAME: Rhodus mouthwash swish and spit daily as needed        Vitamin D (Cholecalciferol) 1000 units Caps      Take 1 capsule by mouth daily

## 2018-06-28 ENCOUNTER — TRANSFERRED RECORDS (OUTPATIENT)
Dept: HEALTH INFORMATION MANAGEMENT | Facility: CLINIC | Age: 78
End: 2018-06-28

## 2018-06-28 LAB
ALT SERPL-CCNC: 26 IU/L (ref 5–35)
AST SERPL-CCNC: 26 U/L (ref 5–34)
CREAT SERPL-MCNC: 0.57 MG/DL (ref 0.5–1.3)
GFR SERPL CREATININE-BSD FRML MDRD: 109.9 ML/MIN/1.73M2

## 2018-06-29 RX ORDER — CONJUGATED ESTROGENS 0.62 MG/1
TABLET, FILM COATED ORAL
Qty: 90 TABLET | Refills: 0 | COMMUNITY
Start: 2018-06-29

## 2018-06-29 RX ORDER — METOPROLOL SUCCINATE 50 MG/1
TABLET, EXTENDED RELEASE ORAL
Qty: 90 TABLET | Refills: 3 | COMMUNITY
Start: 2018-06-29

## 2018-07-13 ENCOUNTER — TELEPHONE (OUTPATIENT)
Dept: FAMILY MEDICINE | Facility: CLINIC | Age: 78
End: 2018-07-13

## 2018-07-13 NOTE — TELEPHONE ENCOUNTER
"FYI to PCP  Spoke with patient  SOB started \"months\" ago but didn't interpret as SOB previously  Only thing she notices is if telling a long sentence, needs to pause to take a breath  Noticed this before recent ER visit - is not worse then it was   Not occurring on a daily basis - but lives alone so doesn't often have long conversations   Occurs \"occasionally\"   Sometimes notices with lying down also   Does not self-monitor BP or pulse   No bleeding   No black tarry stool or red stools   No chest pain   Has RA - fatigues pt, but that is normal for her, otherwise feeling normal   Denies lightheadedness/dizzy  Did have 1 episode of ankle swelling after walking all day and was very hot out 97F one day last week - her daughter is an NP and had her elevate her ankles and was fine after that   Denies any other concerns     Advised OV - pt wanted to see PCP - scheduled for 7/20/18 with PCP. She agreed to call back if any new or worsening symptoms before upcoming visit.     Brionna TONEY RN    "

## 2018-07-13 NOTE — TELEPHONE ENCOUNTER
"Please advise.      Thank you.          ----- Message -----        From: Ashanti Jarvis        Sent: 7/12/2018   7:00 PM          To:  Reception     Subject: Appointment Request                                    Appointment Request From: Ashanti Jarvis          With Provider: Pamela Bobby DO [-Primary Care Physician-]          Preferred Date Range: From 7/12/2018 To 7/25/2018          Preferred Times: Any          Reason for visit: Request an Appointment          Comments:     Dr. Jacob:  I have many things I could request an appointment for: haven't had my yearly Mammogram, etc.  However a couple of weeks ago, I had an episode of violent stomach ache and headache - went in to [facility] and spent most of the night there - lots of test but no definitive cause.  Went away with a \"possible ulcer\" - now after being further away from the incident, I feel I may have shortness of breath - I think I had it that night, too, but not sure.  I would like to see you about the shortness of breadth - noticeable when I say a long sentence and can't fnish it without taking a breadth.  Please let me know if you have time during the time frame I suggested above.  Thanks.  Paris Jarvis       "

## 2018-07-20 ENCOUNTER — OFFICE VISIT (OUTPATIENT)
Dept: FAMILY MEDICINE | Facility: CLINIC | Age: 78
End: 2018-07-20
Payer: COMMERCIAL

## 2018-07-20 VITALS
OXYGEN SATURATION: 96 % | HEART RATE: 73 BPM | HEIGHT: 63 IN | SYSTOLIC BLOOD PRESSURE: 142 MMHG | BODY MASS INDEX: 25.34 KG/M2 | TEMPERATURE: 98.3 F | WEIGHT: 143 LBS | DIASTOLIC BLOOD PRESSURE: 86 MMHG

## 2018-07-20 DIAGNOSIS — Z13.220 LIPID SCREENING: ICD-10-CM

## 2018-07-20 DIAGNOSIS — M06.9 RHEUMATOID ARTHRITIS, INVOLVING UNSPECIFIED SITE, UNSPECIFIED RHEUMATOID FACTOR PRESENCE: ICD-10-CM

## 2018-07-20 DIAGNOSIS — Z12.31 VISIT FOR SCREENING MAMMOGRAM: ICD-10-CM

## 2018-07-20 DIAGNOSIS — N94.9 ADNEXAL CYST: Primary | ICD-10-CM

## 2018-07-20 DIAGNOSIS — R10.9 ABDOMINAL DISCOMFORT: ICD-10-CM

## 2018-07-20 PROCEDURE — 99214 OFFICE O/P EST MOD 30 MIN: CPT | Performed by: INTERNAL MEDICINE

## 2018-07-20 NOTE — MR AVS SNAPSHOT
After Visit Summary   7/20/2018    Ashanti Jarvis    MRN: [id number]           Patient Information     Date Of Birth          1940        Visit Information        Provider Department      7/20/2018 11:00 AM Pamela Bobby, DO [facility]        Today's Diagnoses     Adnexal cyst    -  1    Visit for screening mammogram        Lipid screening          Care Instructions    STOP Zantac    [facility]: [phone] in [address] downstairs for mammogram (YOU CAN DO TODAY IF YOU WISH)    Schedule future pelvic ultrasound to look at your small left adnexal cyst - at [facility]: [phone]      Schedule future fasting labs here (12 hours) in our clinic          Follow-ups after your visit        Future tests that were ordered for you today     Open Future Orders        Priority Expected Expires Ordered    MA SCREENING DIGITAL BILAT - Future  (s+30) Routine  7/19/2019 7/20/2018    Lipid panel reflex to direct LDL Fasting Routine 7/23/2018 8/20/2018 7/20/2018    US Pelvic Complete w Transvaginal Routine  7/20/2019 7/20/2018    Glucose Routine 7/23/2018 8/20/2018 7/20/2018            Who to contact     If you have questions or need follow up information about today's clinic visit or your schedule please contact [facility] directly at [phone].  Normal or non-critical lab and imaging results will be communicated to you by MyChart, letter or phone within 4 business days after the clinic has received the results. If you do not hear from us within 7 days, please contact the clinic through MyChart or phone. If you have a critical or abnormal lab result, we will notify you by phone as soon as possible.  Submit refill requests through [email or web address] or call your pharmacy and they will forward the refill request to us. Please allow 3 business days for your refill to be completed.          Additional Information About Your Visit        MyChart Information  "    [employer] gives you secure access to your electronic health record. If you see a primary care provider, you can also send messages to your care team and make appointments. If you have questions, please call your primary care clinic.  If you do not have a primary care provider, please call [phone] and they will assist you.        Care EveryWhere ID     This is your Care EveryWhere ID. This could be used by other organizations to access your [place] medical records  [id number]        Your Vitals Were     Pulse Temperature Height Pulse Oximetry BMI (Body Mass Index)       73 98.3  F (36.8  C) (Tympanic) 5' 3\" (1.6 m) 96% 25.33 kg/m2        Blood Pressure from Last 3 Encounters:   07/20/18 142/86   06/27/18 141/89   06/20/18 129/65    Weight from Last 3 Encounters:   07/20/18 143 lb (64.9 kg)   06/27/18 146 lb (66.2 kg)   06/20/18 145 lb (65.8 kg)                 Today's Medication Changes          These changes are accurate as of 7/20/18 11:57 AM.  If you have any questions, ask your nurse or doctor.               Stop taking these medicines if you haven't already. Please contact your care team if you have questions.     HYDROcodone-acetaminophen 5-325 MG per tablet   Commonly known as:  NORCO   Stopped by:  Pamela Bobby DO                    Primary Care Provider Office Phone # Fax #    Pamela Bobby [phone] [address] [address]        Equal Access to Services     [facility] : Hadii philip paiz hadasho Sowandy, waaxda luqadaha, qaybta kaalmada ozzy savage. So [facility] [phone].    ATENCIÓN: Si habla español, tiene a rodriguez disposición servicios gratuitos de asistencia lingüística. Llame al [phone].    We comply with applicable federal civil rights laws and [place] laws. We do not discriminate on the basis of race, color, national origin, age, disability, sex, sexual orientation, or gender identity.          "   Thank you!     Thank you for choosing [facility]  for your care. Our goal is always to provide you with excellent care. Hearing back from our patients is one way we can continue to improve our services. Please take a few minutes to complete the written survey that you may receive in the mail after your visit with us. Thank you!             Your Updated Medication List - Protect others around you: Learn how to safely use, store and throw away your medicines at www.disposemymeds.org.          This list is accurate as of 7/20/18 11:57 AM.  Always use your most recent med list.                   Brand Name Dispense Instructions for use Diagnosis    Calcium-Magnesium-Zinc 167-83-8 MG Tabs      Take 1 tablet by mouth daily        cevimeline 30 MG capsule    EVOXAC     Take 30 mg by mouth 3 times daily        dexamethasone 0.5 MG/5ML Elix      Swish and spit in mouth daily as needed (2 weeks at a time)        estrogens (conjugated) 0.625 MG tablet    PREMARIN    90 tablet    Take 1 tablet (0.625 mg) by mouth daily    Essential hypertension, benign, Menopause       leflunomide 20 MG tablet    ARAVA     Take 1 tablet by mouth daily        metoprolol succinate 50 MG 24 hr tablet    TOPROL-XL    90 tablet    Take 1 tablet (50 mg) by mouth daily    Essential hypertension, benign       Multi-vitamin Tabs tablet     100 tablet    Take 1 tablet by mouth daily        PROBIOTIC FORMULA PO      Take  by mouth daily.        UNABLE TO FIND      MEDICATION NAME: Ana Rosa mouthwash swish and spit daily as needed        Vitamin D (Cholecalciferol) 1000 units Caps      Take 1 capsule by mouth daily

## 2018-07-20 NOTE — PATIENT INSTRUCTIONS
STOP Zantac    [facility]: [phone] in [address] downstairs for mammogram (YOU CAN DO TODAY IF YOU WISH)    Schedule future pelvic ultrasound to look at your small left adnexal cyst - at [facility]: [phone]      Schedule future fasting labs here (12 hours) in our clinic

## 2018-07-20 NOTE — NURSING NOTE
"Chief Complaint   Patient presents with     Breathing concerns        Initial /90 (BP Location: Right arm, Patient Position: Chair, Cuff Size: Adult Regular)  Pulse 73  Temp 98.3  F (36.8  C) (Tympanic)  Ht 5' 3\" (1.6 m)  Wt 143 lb (64.9 kg)  SpO2 96%  BMI 25.33 kg/m2 Estimated body mass index is 25.33 kg/(m^2) as calculated from the following:    Height as of this encounter: 5' 3\" (1.6 m).    Weight as of this encounter: 143 lb (64.9 kg)..    BP completed using cuff size: large  MEDICATIONS REVIEWED  SOCIAL AND FAMILY HX REVIEWED  Sherie Castillo CMA  "

## 2018-07-24 ENCOUNTER — HOSPITAL ENCOUNTER (OUTPATIENT)
Dept: MAMMOGRAPHY | Facility: CLINIC | Age: 78
Discharge: HOME | End: 2018-07-24
Attending: INTERNAL MEDICINE | Admitting: INTERNAL MEDICINE
Payer: MEDICARE

## 2018-07-24 DIAGNOSIS — Z13.220 LIPID SCREENING: ICD-10-CM

## 2018-07-24 DIAGNOSIS — Z12.31 VISIT FOR SCREENING MAMMOGRAM: ICD-10-CM

## 2018-07-24 DIAGNOSIS — R10.9 LEFT FLANK PAIN: ICD-10-CM

## 2018-07-24 LAB
ALBUMIN UR-MCNC: NEGATIVE MG/DL
APPEARANCE UR: CLEAR
BILIRUB UR QL STRIP: NEGATIVE
CHOLEST SERPL-MCNC: 277 MG/DL
COLOR UR AUTO: YELLOW
GLUCOSE SERPL-MCNC: 91 MG/DL (ref 70–99)
GLUCOSE UR STRIP-MCNC: NEGATIVE MG/DL
HDLC SERPL-MCNC: 62 MG/DL
HGB UR QL STRIP: NEGATIVE
KETONES UR STRIP-MCNC: NEGATIVE MG/DL
LDLC SERPL CALC-MCNC: 186 MG/DL
LEUKOCYTE ESTERASE UR QL STRIP: NEGATIVE
NITRATE UR QL: NEGATIVE
NONHDLC SERPL-MCNC: 215 MG/DL
PH UR STRIP: 7 PH (ref 5–7)
SOURCE: NORMAL
SP GR UR STRIP: 1.02 (ref 1–1.03)
TRIGL SERPL-MCNC: 143 MG/DL
UROBILINOGEN UR STRIP-ACNC: 0.2 EU/DL (ref 0.2–1)

## 2018-07-24 PROCEDURE — 77067 SCR MAMMO BI INCL CAD: CPT

## 2018-07-24 PROCEDURE — 82947 ASSAY GLUCOSE BLOOD QUANT: CPT | Performed by: INTERNAL MEDICINE

## 2018-07-24 PROCEDURE — 80061 LIPID PANEL: CPT | Performed by: INTERNAL MEDICINE

## 2018-07-24 PROCEDURE — 81003 URINALYSIS AUTO W/O SCOPE: CPT | Performed by: INTERNAL MEDICINE

## 2018-07-24 PROCEDURE — 36415 COLL VENOUS BLD VENIPUNCTURE: CPT | Performed by: INTERNAL MEDICINE

## 2018-07-25 ENCOUNTER — MYC MEDICAL ADVICE (OUTPATIENT)
Dept: FAMILY MEDICINE | Facility: CLINIC | Age: 78
End: 2018-07-25

## 2018-07-25 DIAGNOSIS — E78.5 HYPERLIPIDEMIA, UNSPECIFIED HYPERLIPIDEMIA TYPE: Primary | ICD-10-CM

## 2018-07-26 PROBLEM — E78.5 HYPERLIPIDEMIA, UNSPECIFIED HYPERLIPIDEMIA TYPE: Status: ACTIVE | Noted: 2018-07-26

## 2018-07-26 RX ORDER — ATORVASTATIN CALCIUM 20 MG/1
20 TABLET, FILM COATED ORAL EVERY EVENING
Qty: 90 TABLET | Refills: 3 | Status: SHIPPED | OUTPATIENT
Start: 2018-07-26 | End: 2019-06-20

## 2018-08-04 PROBLEM — N94.9 ADNEXAL CYST: Status: ACTIVE | Noted: 2018-08-04

## 2018-08-04 PROBLEM — E78.5 HYPERLIPIDEMIA, UNSPECIFIED HYPERLIPIDEMIA TYPE: Chronic | Status: ACTIVE | Noted: 2018-07-26

## 2018-08-06 ENCOUNTER — HOSPITAL ENCOUNTER (OUTPATIENT)
Dept: ULTRASOUND IMAGING | Facility: CLINIC | Age: 78
Discharge: HOME | End: 2018-08-06
Attending: INTERNAL MEDICINE | Admitting: INTERNAL MEDICINE
Payer: MEDICARE

## 2018-08-06 DIAGNOSIS — N94.9 ADNEXAL CYST: ICD-10-CM

## 2018-08-06 PROCEDURE — 76830 TRANSVAGINAL US NON-OB: CPT

## 2018-09-07 DIAGNOSIS — Z78.0 MENOPAUSE: ICD-10-CM

## 2018-09-07 DIAGNOSIS — I10 ESSENTIAL HYPERTENSION, BENIGN: Chronic | ICD-10-CM

## 2018-09-10 NOTE — TELEPHONE ENCOUNTER
"Estrogens, conjugated 0.625 mg    Last Written Prescription Date:  06/27/18  Last Fill Quantity: 90 tablets,  # refills: 0   Last office visit: 7/20/2018 with prescribing provider:  Kanu   Future Office Visit:      Requested Prescriptions   Pending Prescriptions Disp Refills     estrogens, conjugated, (PREMARIN) 0.625 MG tablet 90 tablet 0     Sig: Take 1 tablet (0.625 mg) by mouth daily    Hormone Replacement Therapy Failed    9/7/2018 10:32 AM       Failed - Blood pressure under 140/90 in past 12 months    BP Readings from Last 3 Encounters:   07/20/18 142/86   06/27/18 141/89   06/20/18 129/65                Passed - Recent (12 mo) or future (30 days) visit within the authorizing provider's specialty    Patient had office visit in the last 12 months or has a visit in the next 30 days with authorizing provider or within the authorizing provider's specialty.  See \"Patient Info\" tab in inbasket, or \"Choose Columns\" in Meds & Orders section of the refill encounter.           Passed - Patient has mammogram in past 2 years on file if age 50-75       Passed - Patient is 18 years of age or older       Passed - No active pregnancy on record       Passed - No positive pregnancy test on record in past 12 months          "

## 2018-09-10 NOTE — TELEPHONE ENCOUNTER
To PCP:     Routing refill request to provider for review/approval because:  BP out of range per RN Refill Protocol x2.     BP Readings from Last 3 Encounters:   07/20/18 142/86   06/27/18 141/89   06/20/18 129/65       Please review and authorize if appropriate,     Thank you,   Maty ANSARI RN

## 2018-11-14 ENCOUNTER — TRANSFERRED RECORDS (OUTPATIENT)
Dept: HEALTH INFORMATION MANAGEMENT | Facility: CLINIC | Age: 78
End: 2018-11-14

## 2018-12-07 NOTE — PROGRESS NOTES
"  SUBJECTIVE:   Ashanti Jarvis is a 78 year old female who presents to clinic today for the following health issues:      New Patient/Transfer of Care  URINARY TRACT SYMPTOMS      Duration: ***    Description  { SYMPTOMS FEMALE:375042}    Intensity:  {mild,moderate,severe:070881}    Accompanying signs and symptoms:  Fever/chills: { :711353}  Flank pain { :807207}  Nausea and vomiting: { :561440}  Vaginal symptoms: {VAGINAL SYMPTOMS:091416::\"none\"}  Abdominal/Pelvic Pain: { :827986}    History  History of frequent UTI's: { :384958}  History of kidney stones: { :495407}  Sexually Active: { :190891}  Possibility of pregnancy: { :122911::\"No\"}    Precipitating or alleviating factors: {NONE DEFAULTED:369630::\"None\"}    Therapies tried and outcome: {URINARY TREATMENTS FEMALE:053031::\"none\"}   Outcome: ***      {additional problems for provider to add:198170}    Problem list and histories reviewed & adjusted, as indicated.  Additional history: {NONE - AS DOCUMENTED:109780::\"as documented\"}    {HIST REVIEW/ LINKS 2:810549}    Reviewed and updated as needed this visit by clinical staff       Reviewed and updated as needed this visit by Provider         {PROVIDER CHARTING PREFERENCE:247600}    "

## 2018-12-10 ENCOUNTER — OFFICE VISIT (OUTPATIENT)
Dept: FAMILY MEDICINE | Facility: CLINIC | Age: 78
End: 2018-12-10
Payer: COMMERCIAL

## 2018-12-10 VITALS
WEIGHT: 149 LBS | SYSTOLIC BLOOD PRESSURE: 152 MMHG | BODY MASS INDEX: 26.4 KG/M2 | HEART RATE: 77 BPM | TEMPERATURE: 97.6 F | DIASTOLIC BLOOD PRESSURE: 94 MMHG | HEIGHT: 63 IN | OXYGEN SATURATION: 97 %

## 2018-12-10 DIAGNOSIS — R32 URINARY INCONTINENCE, UNSPECIFIED TYPE: Primary | ICD-10-CM

## 2018-12-10 PROCEDURE — 99213 OFFICE O/P EST LOW 20 MIN: CPT | Performed by: INTERNAL MEDICINE

## 2018-12-10 ASSESSMENT — MIFFLIN-ST. JEOR: SCORE: 1134.99

## 2018-12-10 NOTE — PROGRESS NOTES
"  SUBJECTIVE:   Ashanti Jarvis is a 78 year old female who presents to clinic today for the following health issues:      Chief Complaint   Patient presents with     Consult     Urge urinary incontinence (Chronic)          HPI:   Patient Ashanti Jarivs is a very pleasant 78 year old female with history of urinary incontinence who presents to Internal Medicine clinic today for evaluation of poorly controlled chronic urinary incontinence symptoms. Regarding the patient's urinary incontinence, the patient is interested in an evaluation by outpatient [facility] clinic in [place] going forward.           Current Medications:     Current Outpatient Medications   Medication Sig Dispense Refill     atorvastatin (LIPITOR) 20 MG tablet Take 1 tablet (20 mg) by mouth every evening 90 tablet 3     Calcium-Magnesium-Zinc 167-83-8 MG TABS Take 1 tablet by mouth daily       cevimeline (EVOXAC) 30 MG capsule Take 30 mg by mouth 3 times daily        dexamethasone 0.5 MG/5ML ELIX Swish and spit in mouth daily as needed (2 weeks at a time)        estrogens, conjugated, (PREMARIN) 0.625 MG tablet Take 1 tablet (0.625 mg) by mouth daily 90 tablet 1     leflunomide (ARAVA) 20 MG tablet Take 1 tablet by mouth daily       metoprolol succinate (TOPROL-XL) 50 MG 24 hr tablet Take 1 tablet (50 mg) by mouth daily 90 tablet 3     multivitamin, therapeutic with minerals (MULTI-VITAMIN) TABS Take 1 tablet by mouth daily 100 tablet 3     Probiotic Product (PROBIOTIC FORMULA PO) Take  by mouth daily.       UNABLE TO FIND MEDICATION NAME: Ana [place] mouthwash swish and spit daily as needed       Vitamin D, Cholecalciferol, 1000 UNITS CAPS Take 1 capsule by mouth daily           Allergies:      Allergies   Allergen Reactions     Vancomycin Rash     \"like lizard skin all over my body\"            Past Medical History:     Past Medical History:   Diagnosis Date     Benign neoplasm of colon 5/01    tubular adenoma; one non-adenomatous polyp 2012 - " rec f/u 5 years     Essential hypertension, benign      Essential tremor      Hx estrogen therapy     Since hysterectomy (estimates ~30 years as of Sept 2014)     Intracranial abscess Dec 2012    H/o sinusitis and intracranial abscess     Lichen planus      Migraine, unspecified, without mention of intractable migraine without mention of status migrainosus 1963     Osteoporosis     Reclast 10/11 --> drug holiday 2015 per rheum     Other isolated or specific phobias     Claustrophobia     Other kyphoscoliosis and scoliosis      Pemphigoid, benign mucous membrane      Personal history of urinary calculi      Rheumatoid arthritis(714.0) 1991    Followed by Dr Vandana Merino at [facility]     Sjogren's syndrome (H)     due to meds     Unspecified tinnitus     Left ear     Urge urinary incontinence          Past Surgical History:     Past Surgical History:   Procedure Laterality Date     APPENDECTOMY       C NONSPECIFIC PROCEDURE  1987    pneumonia     COLONOSCOPY N/A 5/25/2017    Procedure: COLONOSCOPY;  colonoscopy;  Surgeon: Alan Crenshaw MD;  Location:  GI     ENDOSCOPIC BALLOON SINUPLASTY ACCLARENT  12/11/2012    Procedure: ENDOSCOPIC BALLOON SINUPLASTY ACCLARENT;  JOSHUA. MAXILLO SINUS SURGERY,  ;  Surgeon: David Ortega MD;  Location:  OR     HYSTERECTOMY, VAGINAL  1983    has ovaries; secondary to fibroids     KERATOTOMY ARCUATE WITH FEMTOSECOND LASER/IMAGING FOR ATIOL Right 1/4/2016    Procedure: KERATOTOMY ARCUATE WITH FEMTOSECOND LASER/IMAGING FOR ATIOL;  Surgeon: Larry Vilchis MD;  Location:  EC     OPTICAL TRACKING SYSTEM CRANIOTOMY, EXCISE TUMOR, COMBINED  12/12/2012    Procedure: COMBINED OPTICAL TRACKING SYSTEM CRANIOTOMY, EXCISE TUMOR;  LEFT FRONTAL CRANIOTOMY, IMAGE GUIDANCE FROM FERMIN PALENCIA. - SUPINE, CLAUS MABRY;  Surgeon: Tomi Fernandez MD;  Location:  OR     PHACOEMULSIFICATION CLEAR CORNEA WITH STANDARD INTRAOCULAR LENS IMPLANT Left  2015    Procedure: PHACOEMULSIFICATION CLEAR CORNEA WITH STANDARD INTRAOCULAR LENS IMPLANT;  Surgeon: Larry Vilchis MD;  Location: [facility]     PHACOEMULSIFICATION CLEAR CORNEA WITH STANDARD INTRAOCULAR LENS IMPLANT Right 2016    Procedure: PHACOEMULSIFICATION CLEAR CORNEA WITH STANDARD INTRAOCULAR LENS IMPLANT;  Surgeon: Larry Vilchis MD;  Location: [facility]         Family Medical History:     Family History   Problem Relation Age of Onset     Osteoporosis Mother      Cancer Father         Lung CA          Social History:     Social History     Socioeconomic History     Marital status:      Spouse name: Not on file     Number of children: 2     Years of education: Not on file     Highest education level: Not on file   Social Needs     Financial resource strain: Not on file     Food insecurity - worry: Not on file     Food insecurity - inability: Not on file     Transportation needs - medical: Not on file     Transportation needs - non-medical: Not on file   Occupational History     Not on file   Tobacco Use     Smoking status: Former Smoker     Packs/day: 0.10     Years: 1.00     Pack years: 0.10     Types: Cigarettes     Last attempt to quit: 1962     Years since quittin.9     Smokeless tobacco: Never Used     Tobacco comment: tried in college   Substance and Sexual Activity     Alcohol use: Yes     Alcohol/week: 1.8 oz     Types: 3 Standard drinks or equivalent per week     Comment:  abt 3 glasses wine per week      Drug use: No     Sexual activity: Not Currently     Partners: Male   Other Topics Concern     Parent/sibling w/ CABG, MI or angioplasty before 65F 55M? Not Asked   Social History Narrative     Not on file           Review of System:     Constitutional: Negative for fever or chills  Skin: Negative for rashes  Ears/Nose/Throat: Negative for nasal congestion, sore throat  Respiratory: No shortness of breath, dyspnea on exertion, cough, or hemoptysis  Cardiovascular: Negative  "for chest pain  Gastrointestinal: Negative for nausea, vomiting  Genitourinary: Positive for chronic urinary incontinence  Musculoskeletal: Negative for myalgias  Neurologic: Negative for headaches  Psychiatric: Negative for depression, anxiety  Hematologic/Lymphatic/Immunologic: Negative  Endocrine: Negative  Behavioral: Negative for tobacco use       Physical Exam:   BP (!) 152/94 (BP Location: Left arm, Cuff Size: Adult Regular)   Pulse 77   Temp 97.6  F (36.4  C) (Oral)   Ht 1.6 m (5' 3\")   Wt 67.6 kg (149 lb)   SpO2 97%   BMI 26.39 kg/m      GENERAL: alert and no distress  EYES: eyes grossly normal to inspection, and conjunctivae and sclerae normal  HENT: Normocephalic atraumatic. Nose and mouth without ulcers or lesions  NECK: supple  RESP: lungs clear to auscultation   CV: regular rate and rhythm, normal S1 S2  LYMPH: no peripheral edema   ABDOMEN: nondistended  MS: no gross musculoskeletal defects noted  SKIN: no suspicious lesions or rashes  NEURO: Alert & Oriented x 3.   PSYCH: mentation appears normal, affect normal        Diagnostic Test Results:     Diagnostic Test Results:  Results for orders placed or performed during the hospital encounter of 08/06/18   US Pelvic Complete w Transvaginal    Narrative    ULTRASOUND PELVIS WITH TRANSVAGINAL IMAGING  8/6/2018 11:10 AM     HISTORY:  Adnexal cyst.    COMPARISON: None.    FINDINGS:  Transvaginal images were performed to better evaluate the  patient's uterus, ovaries and endometrial stripe.    Uterus is absent. The right ovary is not seen. The left ovary is not  seen. No free pelvic fluid is present.      Impression    IMPRESSION: Bilateral ovaries and adnexa not seen.    JERMAINE RILEY MD       ASSESSMENT/PLAN:       (R32) Urinary incontinence, unspecified type  (primary encounter diagnosis)  Comment: poorly controlled chronic urinary incontinence symptoms  Plan: UROLOGY ADULT REFERRAL      Follow Up Plan:     Patient is instructed to return to " Internal Medicine clinic for follow-up visit in 1 week.        Chantale Can MD  Internal Medicine  [facility]

## 2018-12-11 ENCOUNTER — TRANSFERRED RECORDS (OUTPATIENT)
Dept: HEALTH INFORMATION MANAGEMENT | Facility: CLINIC | Age: 78
End: 2018-12-11

## 2018-12-31 ENCOUNTER — TELEPHONE (OUTPATIENT)
Dept: PHARMACY | Facility: CLINIC | Age: 78
End: 2018-12-31

## 2018-12-31 NOTE — TELEPHONE ENCOUNTER
We have been unable to reach this patient for MTM follow-up after several attempts. We will stop reaching out to the patient at this time. Please let us know if we can assist in this patient's care in the future.    Routing to PCP as Padmini DobbsD, [facility]  Medication Therapy Management Provider  Pager: [phone]

## 2019-02-13 ENCOUNTER — OFFICE VISIT (OUTPATIENT)
Dept: UROLOGY | Facility: CLINIC | Age: 79
End: 2019-02-13
Payer: MEDICARE

## 2019-02-13 VITALS
HEIGHT: 63 IN | HEART RATE: 78 BPM | SYSTOLIC BLOOD PRESSURE: 136 MMHG | WEIGHT: 145 LBS | DIASTOLIC BLOOD PRESSURE: 72 MMHG | BODY MASS INDEX: 25.69 KG/M2 | OXYGEN SATURATION: 96 %

## 2019-02-13 DIAGNOSIS — N39.41 URGE INCONTINENCE: ICD-10-CM

## 2019-02-13 DIAGNOSIS — R32 URINARY INCONTINENCE, UNSPECIFIED TYPE: Primary | ICD-10-CM

## 2019-02-13 LAB
ALBUMIN UR-MCNC: NEGATIVE MG/DL
APPEARANCE UR: CLEAR
BILIRUB UR QL STRIP: NEGATIVE
COLOR UR AUTO: YELLOW
GLUCOSE UR STRIP-MCNC: NEGATIVE MG/DL
HGB UR QL STRIP: ABNORMAL
KETONES UR STRIP-MCNC: NEGATIVE MG/DL
LEUKOCYTE ESTERASE UR QL STRIP: NEGATIVE
NITRATE UR QL: NEGATIVE
PH UR STRIP: 6 PH (ref 5–7)
RESIDUAL VOLUME (RV) (EXTERNAL): 68
SOURCE: ABNORMAL
SP GR UR STRIP: 1.01 (ref 1–1.03)
UROBILINOGEN UR STRIP-ACNC: 0.2 EU/DL (ref 0.2–1)

## 2019-02-13 PROCEDURE — 81003 URINALYSIS AUTO W/O SCOPE: CPT | Performed by: UROLOGY

## 2019-02-13 PROCEDURE — 99203 OFFICE O/P NEW LOW 30 MIN: CPT | Mod: 25 | Performed by: UROLOGY

## 2019-02-13 PROCEDURE — 51798 US URINE CAPACITY MEASURE: CPT | Performed by: UROLOGY

## 2019-02-13 ASSESSMENT — ENCOUNTER SYMPTOMS
LEG SWELLING: 0
MUSCLE WEAKNESS: 0
MYALGIAS: 0
TROUBLE SWALLOWING: 0
SWOLLEN GLANDS: 0
SHORTNESS OF BREATH: 0
SINUS PAIN: 0
FLANK PAIN: 0
SKIN CHANGES: 0
POSTURAL DYSPNEA: 0
SLEEP DISTURBANCES DUE TO BREATHING: 0
EYE WATERING: 0
EXERCISE INTOLERANCE: 0
DIZZINESS: 0
TREMORS: 0
BREAST MASS: 0
BRUISES/BLEEDS EASILY: 0
CHILLS: 0
NERVOUS/ANXIOUS: 0
RECTAL PAIN: 0
WEAKNESS: 0
MUSCLE CRAMPS: 0
TACHYCARDIA: 0
DECREASED CONCENTRATION: 0
COUGH DISTURBING SLEEP: 0
CONSTIPATION: 0
HALLUCINATIONS: 0
ALTERED TEMPERATURE REGULATION: 0
SINUS CONGESTION: 0
BLOATING: 0
CLAUDICATION: 0
NAIL CHANGES: 0
BACK PAIN: 0
BLOOD IN STOOL: 0
DECREASED APPETITE: 0
DOUBLE VISION: 0
HEADACHES: 0
DECREASED LIBIDO: 0
DIFFICULTY URINATING: 0
NECK PAIN: 0
SPUTUM PRODUCTION: 0
NIGHT SWEATS: 0
RECTAL BLEEDING: 0
HYPERTENSION: 0
DIARRHEA: 0
HEMATURIA: 0
HYPOTENSION: 0
DYSPNEA ON EXERTION: 0
EYE IRRITATION: 0
NUMBNESS: 0
FEVER: 0
ABDOMINAL PAIN: 0
ARTHRALGIAS: 0
HEARTBURN: 0
DISTURBANCES IN COORDINATION: 0
NECK MASS: 0
PARALYSIS: 0
BOWEL INCONTINENCE: 0
TASTE DISTURBANCE: 0
INCREASED ENERGY: 0
POLYPHAGIA: 0
EYE REDNESS: 0
SYNCOPE: 0
FATIGUE: 0
EYE PAIN: 0
HOARSE VOICE: 0
JOINT SWELLING: 0
SPEECH CHANGE: 0
STIFFNESS: 0
SORE THROAT: 0
VOMITING: 0
LOSS OF CONSCIOUSNESS: 0
HEMOPTYSIS: 0
TINGLING: 0
ORTHOPNEA: 0
DEPRESSION: 0
SEIZURES: 0
WHEEZING: 0
POLYDIPSIA: 0
WEIGHT GAIN: 0
SNORES LOUDLY: 0
HOT FLASHES: 0
BREAST PAIN: 0
LEG PAIN: 0
DYSURIA: 0
POOR WOUND HEALING: 0
JAUNDICE: 0
PANIC: 0
COUGH: 0
EXTREMITY NUMBNESS: 0
WEIGHT LOSS: 0
PALPITATIONS: 0
INSOMNIA: 0
RESPIRATORY PAIN: 0
MEMORY LOSS: 0
LIGHT-HEADEDNESS: 0
SMELL DISTURBANCE: 0
NAUSEA: 0

## 2019-02-13 ASSESSMENT — PAIN SCALES - GENERAL: PAINLEVEL: NO PAIN (0)

## 2019-02-13 ASSESSMENT — MIFFLIN-ST. JEOR: SCORE: 1116.85

## 2019-02-13 NOTE — PROGRESS NOTES
February 13, 2019    Referring Provider/Primary Care Provider: Pamela Bobby    CC: Urinary incontinence    HPI:  Ashanti Jarvis is a 78 year old female who presents for evaluation of her pelvic floor symptoms.  She reports a long history of urinary incontinence, often without warning or ability to stop the flow.  She states some times there may be an associated urge.    Denies gross hematuria, UTI, vaginal bleeding, vaginal bulge.  Occasional bulge.  Is sexually active, denies dyspareunia    Has history of hysterectomy    Has tried oxybutynin and myrbetriq that did not help.  Did go to pelvic floor physical therapy at [facility] in the past as well and it did not help.    Past Medical History:   Diagnosis Date     Benign neoplasm of colon 5/01    tubular adenoma; one non-adenomatous polyp 2012 - rec f/u 5 years     Essential hypertension, benign      Essential tremor      Hx estrogen therapy     Since hysterectomy (estimates ~30 years as of Sept 2014)     Intracranial abscess Dec 2012    H/o sinusitis and intracranial abscess     Lichen planus      Migraine, unspecified, without mention of intractable migraine without mention of status migrainosus 1963     Osteoporosis     Reclast 10/11 --> drug holiday 2015 per rheum     Other isolated or specific phobias     Claustrophobia     Other kyphoscoliosis and scoliosis      Pemphigoid, benign mucous membrane      Personal history of urinary calculi      Rheumatoid arthritis(714.0) 1991    Followed by Dr Vandana Merino at [facility]     Sjogren's syndrome (H)     due to meds     Unspecified tinnitus     Left ear     Urge urinary incontinence        Past Surgical History:   Procedure Laterality Date     APPENDECTOMY       C NONSPECIFIC PROCEDURE  1987    pneumonia     COLONOSCOPY N/A 5/25/2017    Procedure: COLONOSCOPY;  colonoscopy;  Surgeon: Alan Crenshaw MD;  Location:  GI     ENDOSCOPIC BALLOON SINUPLASTY ACCLARENT  12/11/2012     Procedure: ENDOSCOPIC BALLOON SINUPLASTY ACCLARENT;  JOSHUA. MAXILLO SINUS SURGERY,  ;  Surgeon: David Ortega MD;  Location:  OR     HYSTERECTOMY, VAGINAL  1983    has ovaries; secondary to fibroids     KERATOTOMY ARCUATE WITH FEMTOSECOND LASER/IMAGING FOR ATIOL Right 2016    Procedure: KERATOTOMY ARCUATE WITH FEMTOSECOND LASER/IMAGING FOR ATIOL;  Surgeon: Larry Vilchis MD;  Location: [facility]     OPTICAL TRACKING SYSTEM CRANIOTOMY, EXCISE TUMOR, COMBINED  2012    Procedure: COMBINED OPTICAL TRACKING SYSTEM CRANIOTOMY, EXCISE TUMOR;  LEFT FRONTAL CRANIOTOMY, IMAGE GUIDANCE FROM FERMIN PALENCIA. - SUPINE, [place]VLIN CLOTILDE;  Surgeon: Tomi Fernandez MD;  Location:  OR     PHACOEMULSIFICATION CLEAR CORNEA WITH STANDARD INTRAOCULAR LENS IMPLANT Left 2015    Procedure: PHACOEMULSIFICATION CLEAR CORNEA WITH STANDARD INTRAOCULAR LENS IMPLANT;  Surgeon: Larry Vilchis MD;  Location:  EC     PHACOEMULSIFICATION CLEAR CORNEA WITH STANDARD INTRAOCULAR LENS IMPLANT Right 2016    Procedure: PHACOEMULSIFICATION CLEAR CORNEA WITH STANDARD INTRAOCULAR LENS IMPLANT;  Surgeon: Larry Vilchis MD;  Location: [facility]       Social History     Socioeconomic History     Marital status:      Spouse name: Not on file     Number of children: 2     Years of education: Not on file     Highest education level: Not on file   Social Needs     Financial resource strain: Not on file     Food insecurity - worry: Not on file     Food insecurity - inability: Not on file     Transportation needs - medical: Not on file     Transportation needs - non-medical: Not on file   Occupational History     Not on file   Tobacco Use     Smoking status: Former Smoker     Packs/day: 0.10     Years: 1.00     Pack years: 0.10     Types: Cigarettes     Last attempt to quit: 1962     Years since quittin.1     Smokeless tobacco: Never Used     Tobacco comment: tried in college   Substance and Sexual Activity      Alcohol use: Yes     Alcohol/week: 1.8 oz     Types: 3 Standard drinks or equivalent per week     Comment:  abt 3 glasses wine per week      Drug use: No     Sexual activity: Not Currently     Partners: Male   Other Topics Concern     Parent/sibling w/ CABG, MI or angioplasty before 65F 55M? Not Asked   Social History Narrative     Not on file       Family History   Problem Relation Age of Onset     Osteoporosis Mother      Cancer Father         Lung CA      Review of Systems     Constitutional:  Negative for fever, chills, weight loss, weight gain, fatigue, decreased appetite, night sweats, recent stressors, height gain, height loss, post-operative complications, incisional pain, hallucinations, increased energy, hyperactivity and confused.   HENT:  Negative for ear pain, hearing loss, tinnitus, nosebleeds, trouble swallowing, hoarse voice, mouth sores, sore throat, ear discharge, tooth pain, gum tenderness, taste disturbance, smell disturbance, hearing aid, bleeding gums, dry mouth, sinus pain, sinus congestion and neck mass.    Eyes:  Negative for double vision, pain, redness, eye pain, decreased vision, eye watering, eye bulging, eye dryness, flashing lights, spots, floaters, strabismus, tunnel vision, jaundice and eye irritation.   Respiratory:   Negative for cough, hemoptysis, sputum production, shortness of breath, wheezing, sleep disturbances due to breathing, snores loudly, respiratory pain, dyspnea on exertion, cough disturbing sleep and postural dyspnea.    Cardiovascular:  Negative for chest pain, dyspnea on exertion, palpitations, orthopnea, claudication, leg swelling, fingers/toes turn blue, hypertension, hypotension, syncope, history of heart murmur, chest pain on exertion, chest pain at rest, pacemaker, few scattered varicosities, leg pain, sleep disturbances due to breathing, tachycardia, light-headedness, exercise intolerance and edema.   Gastrointestinal:  Negative for heartburn, nausea, vomiting,  "abdominal pain, diarrhea, constipation, blood in stool, melena, rectal pain, bloating, hemorrhoids, bowel incontinence, jaundice, rectal bleeding, coffee ground emesis and change in stool.   Genitourinary:  Positive for bladder incontinence, urgency and nocturia. Negative for dysuria, hematuria, flank pain, vaginal discharge, difficulty urinating, genital sores, dyspareunia, decreased libido, voiding less frequently, arousal difficulty, abnormal vaginal bleeding, excessive menstruation, menstrual changes, hot flashes, vaginal dryness and postmenopausal bleeding.   Musculoskeletal:  Negative for myalgias, back pain, joint swelling, arthralgias, stiffness, muscle cramps, neck pain, bone pain, muscle weakness and fracture.   Skin:  Negative for nail changes, itching, poor wound healing, rash, hair changes, skin changes, acne, warts, poor wound healing, scarring, flaky skin, Raynaud's phenomenon, sensitivity to sunlight and skin thickening.   Neurological:  Negative for dizziness, tingling, tremors, speech change, seizures, loss of consciousness, weakness, light-headedness, numbness, headaches, disturbances in coordination, extremity numbness, memory loss, difficulty walking and paralysis.   Endo/Heme:  Negative for anemia, swollen glands and bruises/bleeds easily.   Psychiatric/Behavioral:  Negative for depression, hallucinations, memory loss, decreased concentration, mood swings and panic attacks.    Breast:  Negative for breast discharge, breast mass, breast pain and nipple retraction.   Endocrine:  Negative for altered temperature regulation, polyphagia, polydipsia, unwanted hair growth and change in facial hair.      Allergies   Allergen Reactions     Vancomycin Rash     \"like lizard skin all over my body\"     Current Outpatient Medications   Medication     atorvastatin (LIPITOR) 20 MG tablet     Calcium-Magnesium-Zinc 167-83-8 MG TABS     cevimeline (EVOXAC) 30 MG capsule     dexamethasone 0.5 MG/5ML ELIX     " "estrogens, conjugated, (PREMARIN) 0.625 MG tablet     leflunomide (ARAVA) 20 MG tablet     metoprolol succinate (TOPROL-XL) 50 MG 24 hr tablet     multivitamin, therapeutic with minerals (MULTI-VITAMIN) TABS     UNABLE TO FIND     Vitamin D, Cholecalciferol, 1000 UNITS CAPS     Probiotic Product (PROBIOTIC FORMULA PO)     No current facility-administered medications for this visit.        /72 (BP Location: Left arm, Patient Position: Sitting, Cuff Size: Adult Regular)   Pulse 78   Ht 1.6 m (5' 3\")   Wt 65.8 kg (145 lb)   SpO2 96%   BMI 25.69 kg/m   No LMP recorded. Patient has had a hysterectomy. Body mass index is 25.69 kg/m .  She is alert and oriented.  She is well groomed, comfortable in no acute distress. Normal mood and affect.   Non-labored breathing. Normocephalic without masses, lesions, obvious abnormalities. Full ROM in extremities with normal gait.      Urine dip trace blood on voided specimen but not enough for formal urinalysis    PVR 68 mL by bladder scan    A/P: Ashanti Jarvis is a 78 year old F with urinary incontinence some of urgency related, other times unclear etiology    Voiding diary    RTC for cystoscopy given that she has failed first and second line treatment for urgency incontinence, will need to repeat urinalysis to look for hematuria as well    30 minutes were spent with the patient today, > 50% in counseling and coordination of care    Maribell Kwong MD MPH    Urology    CC  Patient Care Team:  Pamela Bobby DO as PCP - General (Internal Medicine)  Chantale Can MD as PCP - Assigned PCP                "

## 2019-02-13 NOTE — PATIENT INSTRUCTIONS
Websites with free information:    [employer] patient website: www.voicesforpfd.org    Total Control Program: www.totalcontrolprogram.com    Please return for a cystoscopy (procedure to look in the bladder) and pelvic exam    It was a pleasure meeting with you today.  Thank you for allowing me and my team the privilege of caring for you today.  YOU are the reason we are here, and I truly hope we provided you with the excellent service you deserve.  Please let us know if there is anything else we can do for you so that we can be sure you are leaving completely satisfied with your care experience.    Cystoscopy    Cystoscopy is a procedure that lets your doctor look directly inside your urethra and bladder. It can be used to:    Help diagnose a problem with your urethra, bladder, or kidneys.    Take a sample (biopsy) of bladder or urethral tissue.    Treat certain problems (such as removing kidney stones).    Place a stent to bypass an obstruction.    Take special X-rays of the kidneys.  Based on the findings, your doctor may recommend other tests or treatments.  What is a cystoscope?  A cystoscope is a telescope-like instrument that contains lenses and fiberoptics (small glass wires that make bright light). The cystoscope may be straight and rigid, or flexible to bend around curves in the urethra. The doctor may look directly into the cystoscope, or project the image onto a monitor.  Getting ready    Ask your doctor if you should stop taking any medicines before the procedure.    Follow any other instructions your doctor gives you.  Tell your doctor before the exam if you:    Take any medicines, such as aspirin or blood thinners    Have allergies to any medicines    Are pregnant   The procedure  Cystoscopy is done in the doctor s office, surgery center, or hospital. The doctor and a nurse are present during the procedure. It takes only a few minutes, longer if a biopsy, X-ray, or treatment needs  to be done.  During the procedure:    You lie on an exam table on your back, knees bent and legs apart. You are covered with a drape.    Your urethra and the area around it are washed. Anesthetic jelly may be applied to numb the urethra.    The cystoscope is inserted. A sterile fluid is put into the bladder to expand it. You may feel pressure from this fluid.    When the procedure is done, the cystoscope is removed.  After the procedure   Once you re home:    Drink plenty of fluids.    You may have burning or light bleeding when you urinate--this is normal.    Medicines may be prescribed to ease any discomfort or prevent infection. Take these as directed.    Call your doctor if you have heavy bleeding or blood clots, burning that lasts more than a day, a fever over 100 F  (38  C), or trouble urinating.  Date Last Reviewed: 1/1/2017 2000-2017 [employer]. [address] [place] [address], [place] [address]. All rights reserved. This information is not intended as a substitute for professional medical care. Always follow your healthcare professional's instructions.

## 2019-02-13 NOTE — NURSING NOTE
"Chief Complaint   Patient presents with     Incontinence     New Patient here today to see Dr Kwong for Urinary problems       Blood pressure 136/72, pulse 78, height 1.6 m (5' 3\"), weight 65.8 kg (145 lb), SpO2 96 %, not currently breastfeeding. Body mass index is 25.69 kg/m .    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 12-12-12     Chronic maxillary sinusitis S/P drainage of maxillary, frontal sinuses 12-11-12     Osteoporosis     Urge urinary incontinence     Lichen planus     Hyperlipidemia, unspecified hyperlipidemia type     Adnexal cyst       Allergies   Allergen Reactions     Vancomycin Rash     \"like lizard skin all over my body\"       Current Outpatient Medications   Medication Sig Dispense Refill     atorvastatin (LIPITOR) 20 MG tablet Take 1 tablet (20 mg) by mouth every evening 90 tablet 3     Calcium-Magnesium-Zinc 167-83-8 MG TABS Take 1 tablet by mouth daily       cevimeline (EVOXAC) 30 MG capsule Take 30 mg by mouth 3 times daily        dexamethasone 0.5 MG/5ML ELIX Swish and spit in mouth daily as needed (2 weeks at a time)        estrogens, conjugated, (PREMARIN) 0.625 MG tablet Take 1 tablet (0.625 mg) by mouth daily 90 tablet 1     leflunomide (ARAVA) 20 MG tablet Take 1 tablet by mouth daily       metoprolol succinate (TOPROL-XL) 50 MG 24 hr tablet Take 1 tablet (50 mg) by mouth daily 90 tablet 3     multivitamin, therapeutic with minerals (MULTI-VITAMIN) TABS Take 1 tablet by mouth daily 100 tablet 3     UNABLE TO FIND MEDICATION NAME: Rhodus mouthwash swish and spit daily as needed       Vitamin D, Cholecalciferol, 1000 UNITS CAPS Take 1 capsule by mouth daily       " Probiotic Product (PROBIOTIC FORMULA PO) Take  by mouth daily.         Social History     Tobacco Use     Smoking status: Former Smoker     Packs/day: 0.10     Years: 1.00     Pack years: 0.10     Types: Cigarettes     Last attempt to quit: 1962     Years since quittin.1     Smokeless tobacco: Never Used     Tobacco comment: tried in college   Substance Use Topics     Alcohol use: Yes     Alcohol/week: 1.8 oz     Types: 3 Standard drinks or equivalent per week     Comment:  abt 3 glasses wine per week      Drug use: Muriel Jain MA  2019

## 2019-02-13 NOTE — LETTER
RE: Ashanti Jarvis  [address]  [facility] [facility] [id number]     Dear Colleague,    Thank you for referring your patient, Ashanti Jarvis, to the [facility] at [facility]. Please see a copy of my visit note below.    February 13, 2019    Referring Provider/Primary Care Provider: Pamela Bobby    CC: Urinary incontinence    HPI:  Ashanti Jarvis is a 78 year old female who presents for evaluation of her pelvic floor symptoms.  She reports a long history of urinary incontinence, often without warning or ability to stop the flow.  She states some times there may be an associated urge.    Denies gross hematuria, UTI, vaginal bleeding, vaginal bulge.  Occasional bulge.  Is sexually active, denies dyspareunia    Has history of hysterectomy    Has tried oxybutynin and myrbetriq that did not help.  Did go to pelvic floor physical therapy at [facility] in the past as well and it did not help.    Past Medical History:   Diagnosis Date     Benign neoplasm of colon 5/01    tubular adenoma; one non-adenomatous polyp 2012 - rec f/u 5 years     Essential hypertension, benign      Essential tremor      Hx estrogen therapy     Since hysterectomy (estimates ~30 years as of Sept 2014)     Intracranial abscess Dec 2012    H/o sinusitis and intracranial abscess     Lichen planus      Migraine, unspecified, without mention of intractable migraine without mention of status migrainosus 1963     Osteoporosis     Reclast 10/11 --> drug holiday 2015 per rheum     Other isolated or specific phobias     Claustrophobia     Other kyphoscoliosis and scoliosis      Pemphigoid, benign mucous membrane      Personal history of urinary calculi      Rheumatoid arthritis(714.0) 1991    Followed by Dr Vandana Merino at [facility]     Sjogren's syndrome (H)     due to meds     Unspecified tinnitus     Left ear     Urge urinary  incontinence        Past Surgical History:   Procedure Laterality Date     APPENDECTOMY       C NONSPECIFIC PROCEDURE  1987    pneumonia     COLONOSCOPY N/A 5/25/2017    Procedure: COLONOSCOPY;  colonoscopy;  Surgeon: Alan Crenshaw MD;  Location:  GI     ENDOSCOPIC BALLOON SINUPLASTY ACCLARENT  12/11/2012    Procedure: ENDOSCOPIC BALLOON SINUPLASTY ACCLARENT;  JOSHUA. MAXILLO SINUS SURGERY,  ;  Surgeon: David Ortega MD;  Location:  OR     HYSTERECTOMY, VAGINAL  1983    has ovaries; secondary to fibroids     KERATOTOMY ARCUATE WITH FEMTOSECOND LASER/IMAGING FOR ATIOL Right 1/4/2016    Procedure: KERATOTOMY ARCUATE WITH FEMTOSECOND LASER/IMAGING FOR ATIOL;  Surgeon: Larry Vilchis MD;  Location:  EC     OPTICAL TRACKING SYSTEM CRANIOTOMY, EXCISE TUMOR, COMBINED  12/12/2012    Procedure: COMBINED OPTICAL TRACKING SYSTEM CRANIOTOMY, EXCISE TUMOR;  LEFT FRONTAL CRANIOTOMY, IMAGE GUIDANCE FROM FERMIN PALENCIA. - SUPINE, [place] TONGS;  Surgeon: Tomi Fernandez MD;  Location:  OR     PHACOEMULSIFICATION CLEAR CORNEA WITH STANDARD INTRAOCULAR LENS IMPLANT Left 12/21/2015    Procedure: PHACOEMULSIFICATION CLEAR CORNEA WITH STANDARD INTRAOCULAR LENS IMPLANT;  Surgeon: Larry Vilchis MD;  Location:  EC     PHACOEMULSIFICATION CLEAR CORNEA WITH STANDARD INTRAOCULAR LENS IMPLANT Right 1/4/2016    Procedure: PHACOEMULSIFICATION CLEAR CORNEA WITH STANDARD INTRAOCULAR LENS IMPLANT;  Surgeon: Larry Vilchis MD;  Location:  EC       Social History     Socioeconomic History     Marital status:      Spouse name: Not on file     Number of children: 2     Years of education: Not on file     Highest education level: Not on file   Social Needs     Financial resource strain: Not on file     Food insecurity - worry: Not on file     Food insecurity - inability: Not on file     Transportation needs - medical: Not on file     Transportation needs - non-medical: Not on file   Occupational History      "Not on file   Tobacco Use     Smoking status: Former Smoker     Packs/day: 0.10     Years: 1.00     Pack years: 0.10     Types: Cigarettes     Last attempt to quit: 1962     Years since quittin.1     Smokeless tobacco: Never Used     Tobacco comment: tried in college   Substance and Sexual Activity     Alcohol use: Yes     Alcohol/week: 1.8 oz     Types: 3 Standard drinks or equivalent per week     Comment:  abt 3 glasses wine per week      Drug use: No     Sexual activity: Not Currently     Partners: Male   Other Topics Concern     Parent/sibling w/ CABG, MI or angioplasty before 65F 55M? Not Asked   Social History Narrative     Not on file       Family History   Problem Relation Age of Onset     Osteoporosis Mother      Cancer Father         Lung CA      Allergies   Allergen Reactions     Vancomycin Rash     \"like lizard skin all over my body\"     Current Outpatient Medications   Medication     atorvastatin (LIPITOR) 20 MG tablet     Calcium-Magnesium-Zinc 167-83-8 MG TABS     cevimeline (EVOXAC) 30 MG capsule     dexamethasone 0.5 MG/5ML ELIX     estrogens, conjugated, (PREMARIN) 0.625 MG tablet     leflunomide (ARAVA) 20 MG tablet     metoprolol succinate (TOPROL-XL) 50 MG 24 hr tablet     multivitamin, therapeutic with minerals (MULTI-VITAMIN) TABS     UNABLE TO FIND     Vitamin D, Cholecalciferol, 1000 UNITS CAPS     Probiotic Product (PROBIOTIC FORMULA PO)     No current facility-administered medications for this visit.      /72 (BP Location: Left arm, Patient Position: Sitting, Cuff Size: Adult Regular)   Pulse 78   Ht 1.6 m (5' 3\")   Wt 65.8 kg (145 lb)   SpO2 96%   BMI 25.69 kg/m    No LMP recorded. Patient has had a hysterectomy. Body mass index is 25.69 kg/m .  She is alert and oriented.  She is well groomed, comfortable in no acute distress. Normal mood and affect.   Non-labored breathing. Normocephalic without masses, lesions, obvious abnormalities. Full ROM in extremities with " normal gait.      Urine dip trace blood on voided specimen but not enough for formal urinalysis    PVR 68 mL by bladder scan    A/P: Ashanti Jarvis is a 78 year old F with urinary incontinence some of urgency related, other times unclear etiology    Voiding diary    RTC for cystoscopy given that she has failed first and second line treatment for urgency incontinence, will need to repeat urinalysis to look for hematuria as well    30 minutes were spent with the patient today, > 50% in counseling and coordination of care    Maribell Kwong MD MPH    Urology    CC  Patient Care Team:  Pamela Bobby DO as PCP - General (Internal Medicine)  Chantale Can MD as PCP - Assigned PCP

## 2019-03-21 ENCOUNTER — TRANSFERRED RECORDS (OUTPATIENT)
Dept: HEALTH INFORMATION MANAGEMENT | Facility: CLINIC | Age: 79
End: 2019-03-21

## 2019-04-07 DIAGNOSIS — Z78.0 MENOPAUSE: ICD-10-CM

## 2019-04-08 RX ORDER — CONJUGATED ESTROGENS 0.62 MG/1
TABLET, FILM COATED ORAL
Qty: 90 TABLET | Refills: 1 | Status: SHIPPED | OUTPATIENT
Start: 2019-04-08 | End: 2019-09-20

## 2019-04-08 NOTE — TELEPHONE ENCOUNTER
"estrogens, conjugated, (PREMARIN) 0.625 MG tablet 90 tablet 1 9/10/2018     Last Written Prescription Date:  9/10/18  Last Fill Quantity: 90,  # refills: 1   Last office visit: 12/10/2018 with prescribing provider:  Kanu   Future Office Visit:  None    Requested Prescriptions   Pending Prescriptions Disp Refills     PREMARIN 0.625 MG tablet [Pharmacy Med Name: PREMARIN 0.625MG TABS] 90 tablet 1     Sig: TAKE ONE TABLET BY MOUTH EVERY DAY       Hormone Replacement Therapy Passed - 4/7/2019 11:15 AM        Passed - Blood pressure under 140/90 in past 12 months     BP Readings from Last 3 Encounters:   02/13/19 136/72   12/10/18 (!) 152/94   07/20/18 142/86                 Passed - Recent (12 mo) or future (30 days) visit within the authorizing provider's specialty     Patient had office visit in the last 12 months or has a visit in the next 30 days with authorizing provider or within the authorizing provider's specialty.  See \"Patient Info\" tab in inbasket, or \"Choose Columns\" in Meds & Orders section of the refill encounter.              Passed - Medication is active on med list        Passed - Patient is 18 years of age or older        Passed - No active pregnancy on record        Passed - No positive pregnancy test on record in past 12 months        No flowsheet data found.      "

## 2019-04-17 ENCOUNTER — OFFICE VISIT (OUTPATIENT)
Dept: UROLOGY | Facility: CLINIC | Age: 79
End: 2019-04-17
Payer: MEDICARE

## 2019-04-17 VITALS
HEIGHT: 63 IN | BODY MASS INDEX: 25.69 KG/M2 | WEIGHT: 145 LBS | DIASTOLIC BLOOD PRESSURE: 88 MMHG | SYSTOLIC BLOOD PRESSURE: 140 MMHG

## 2019-04-17 DIAGNOSIS — R82.90 ABNORMAL URINE FINDING: ICD-10-CM

## 2019-04-17 DIAGNOSIS — R32 URINARY INCONTINENCE, UNSPECIFIED TYPE: Primary | ICD-10-CM

## 2019-04-17 LAB
ALBUMIN UR-MCNC: NEGATIVE MG/DL
APPEARANCE UR: CLEAR
BILIRUB UR QL STRIP: NEGATIVE
COLOR UR AUTO: YELLOW
GLUCOSE UR STRIP-MCNC: NEGATIVE MG/DL
HGB UR QL STRIP: NEGATIVE
KETONES UR STRIP-MCNC: NEGATIVE MG/DL
LEUKOCYTE ESTERASE UR QL STRIP: NEGATIVE
NITRATE UR QL: POSITIVE
PH UR STRIP: 6.5 PH (ref 5–7)
SOURCE: ABNORMAL
SP GR UR STRIP: 1.01 (ref 1–1.03)
UROBILINOGEN UR STRIP-ACNC: 0.2 EU/DL (ref 0.2–1)

## 2019-04-17 PROCEDURE — 87088 URINE BACTERIA CULTURE: CPT | Performed by: UROLOGY

## 2019-04-17 PROCEDURE — 87186 SC STD MICRODIL/AGAR DIL: CPT | Performed by: UROLOGY

## 2019-04-17 PROCEDURE — 99212 OFFICE O/P EST SF 10 MIN: CPT | Performed by: UROLOGY

## 2019-04-17 PROCEDURE — 81003 URINALYSIS AUTO W/O SCOPE: CPT | Performed by: UROLOGY

## 2019-04-17 PROCEDURE — 87086 URINE CULTURE/COLONY COUNT: CPT | Performed by: UROLOGY

## 2019-04-17 ASSESSMENT — MIFFLIN-ST. JEOR: SCORE: 1116.85

## 2019-04-17 ASSESSMENT — PAIN SCALES - GENERAL: PAINLEVEL: NO PAIN (0)

## 2019-04-17 NOTE — LETTER
"4/17/2019       RE: Ashanti Jarvis  [address]  [facility] [address]     Dear Colleague,    Thank you for referring your patient, Ashanti Jarvis, to the [facility] [place] at [facility]. Please see a copy of my visit note below.    April 17, 2019    Return visit    Patient returns today for follow up.  She denies any changes in her health since last visit.    /88   Ht 1.6 m (5' 3\")   Wt 65.8 kg (145 lb)   BMI 25.69 kg/m     She is comfortable, in no distress, non-labored breathing.      Urine dip is positive for nitrites    A/P: 78 year old F with urinary incontinence some of urgency related, other times unclear etiology and what appears to be a positive urine    Discussed concern for UTI and risks of cystoscopy.  Given that patient has a positive urine we discussed that it is safest to postpone the procedure and she is agreeable    Will send a urine culture and treat if positive.    Reschedule cystoscopy for next week    10 minutes were spent with the patient today, > 50% in counseling and coordination of care    Maribell Kwong MD MPH   of Urology    CC  Patient Care Team:  Pamela Bobby DO as PCP - General (Internal Medicine)  Chantale Can MD as Assigned PCP                  Again, thank you for allowing me to participate in the care of your patient.      Sincerely,    Maribell Kwong MD      "

## 2019-04-17 NOTE — PROGRESS NOTES
"April 17, 2019    Return visit    Patient returns today for follow up.  She denies any changes in her health since last visit.    /88   Ht 1.6 m (5' 3\")   Wt 65.8 kg (145 lb)   BMI 25.69 kg/m    She is comfortable, in no distress, non-labored breathing.      Urine dip is positive for nitrites    A/P: 78 year old F with urinary incontinence some of urgency related, other times unclear etiology and what appears to be a positive urine    Discussed concern for UTI and risks of cystoscopy.  Given that patient has a positive urine we discussed that it is safest to postpone the procedure and she is agreeable    Will send a urine culture and treat if positive.    Reschedule cystoscopy for next week    10 minutes were spent with the patient today, > 50% in counseling and coordination of care    Maribell Kwong MD MPH   of Urology    CC  Patient Care Team:  Pamela Bobby DO as PCP - General (Internal Medicine)  Chantale Can MD as Assigned PCP                "

## 2019-04-17 NOTE — PATIENT INSTRUCTIONS
We will let you know the urine test results     Please return to see us next week for the procedure    It was a pleasure meeting with you today.  Thank you for allowing me and my team the privilege of caring for you today.  YOU are the reason we are here, and I truly hope we provided you with the excellent service you deserve.  Please let us know if there is anything else we can do for you so that we can be sure you are leaving completely satisfied with your care experience.

## 2019-04-17 NOTE — NURSING NOTE
Chief Complaint   Patient presents with     Cystoscopy     Pt here to have a cysto today due to urinary incontinence     UA RESULTS:  Recent Labs   Lab Test 04/17/19  1002  06/20/18  0515   COLOR Yellow   < > Light Yellow   APPEARANCE Clear   < > Slightly Cloudy   URINEGLC Negative   < > 30*   URINEBILI Negative   < > Negative   URINEKETONE Negative   < > 40*   SG 1.015   < > 1.013   UBLD Negative   < > Negative   URINEPH 6.5   < > 8.0*   PROTEIN Negative   < > 30*   UROBILINOGEN 0.2   < >  --    NITRITE Positive*   < > Negative   LEUKEST Negative   < > Negative   RBCU  --   --  1   WBCU  --   --  0    < > = values in this interval not displayed.

## 2019-04-19 DIAGNOSIS — N39.0 URINARY TRACT INFECTION: Primary | ICD-10-CM

## 2019-04-19 LAB
BACTERIA SPEC CULT: ABNORMAL
Lab: ABNORMAL
SPECIMEN SOURCE: ABNORMAL

## 2019-04-19 RX ORDER — NITROFURANTOIN 25; 75 MG/1; MG/1
100 CAPSULE ORAL 2 TIMES DAILY
Qty: 14 CAPSULE | Refills: 0 | Status: SHIPPED | OUTPATIENT
Start: 2019-04-19 | End: 2019-10-09

## 2019-04-24 ENCOUNTER — OFFICE VISIT (OUTPATIENT)
Dept: UROLOGY | Facility: CLINIC | Age: 79
End: 2019-04-24
Payer: MEDICARE

## 2019-04-24 VITALS
HEIGHT: 63 IN | DIASTOLIC BLOOD PRESSURE: 74 MMHG | BODY MASS INDEX: 24.8 KG/M2 | HEART RATE: 78 BPM | WEIGHT: 140 LBS | SYSTOLIC BLOOD PRESSURE: 130 MMHG

## 2019-04-24 DIAGNOSIS — Z87.440 PERSONAL HISTORY OF URINARY TRACT INFECTION: Primary | ICD-10-CM

## 2019-04-24 DIAGNOSIS — R32 URINARY INCONTINENCE, UNSPECIFIED TYPE: ICD-10-CM

## 2019-04-24 LAB
ALBUMIN UR-MCNC: NEGATIVE MG/DL
APPEARANCE UR: CLEAR
BILIRUB UR QL STRIP: NEGATIVE
COLOR UR AUTO: YELLOW
GLUCOSE UR STRIP-MCNC: NEGATIVE MG/DL
HGB UR QL STRIP: ABNORMAL
KETONES UR STRIP-MCNC: NEGATIVE MG/DL
LEUKOCYTE ESTERASE UR QL STRIP: NEGATIVE
NITRATE UR QL: NEGATIVE
PH UR STRIP: 6 PH (ref 5–7)
SOURCE: ABNORMAL
SP GR UR STRIP: 1.02 (ref 1–1.03)
UROBILINOGEN UR STRIP-ACNC: 0.2 EU/DL (ref 0.2–1)

## 2019-04-24 PROCEDURE — 81003 URINALYSIS AUTO W/O SCOPE: CPT | Performed by: UROLOGY

## 2019-04-24 PROCEDURE — 52000 CYSTOURETHROSCOPY: CPT | Performed by: UROLOGY

## 2019-04-24 ASSESSMENT — MIFFLIN-ST. JEOR: SCORE: 1094.17

## 2019-04-24 ASSESSMENT — PAIN SCALES - GENERAL: PAINLEVEL: NO PAIN (0)

## 2019-04-24 NOTE — NURSING NOTE
She is unable to void for sample . Patient was prepped with Betadine and 14 fr catheter inserted with ease for 45 ml clear urine.   Patient was draped in sterile fashion after betadine prep. 2% Lidocaine gel instilled into urethra . Tawanna Cazares LPN

## 2019-04-24 NOTE — Clinical Note
Will you please contact the patient and to see if she is still interested in us doing a PA for PTNS? Thanks

## 2019-04-24 NOTE — PROGRESS NOTES
"April 24, 2019    Return visit    Patient returns today for follow up for cystoscopy given that her urinary symptoms have not responded to anticholinergics, mirabegron or physical therapy.  She is currently taking the antibiotics as prescribed for the positive urine culture last week.  She denies any changes in her health since last visit.    /74   Pulse 78   Ht 1.6 m (5' 3\")   Wt 63.5 kg (140 lb)   BMI 24.80 kg/m    She is comfortable, in no distress, non-labored breathing.  Abdomen is soft, non-tender, non-distended.  Normal external female genitalia.  Negative CST.  Pelvic exam is remarkable for inability to localize pelvic floor musculature    Cystoscopy Note: After informed consent was obtained patient was prepped and draped in the standard fashion.  The flexible cystoscope was inserted into a normal appearing urethral meatus.  The urothelium was carefully examined and there was some trabeculation but no obvious tumors, masses, stones, foreign bodies, or other urothelial abnormalities noted.  Bilateral ureteral orifices were noted in the normal orthotopic position and both effluxed clear urine.  The cystoscope was retroflexed and the bladder neck was unremarkable.  The urethra was carefully examined upon removing the cystoscope and was unremarkable.  Patient tolerated the procedure without complications noted.      A/P: 78 year old F with urinary incontinence some of urgency related, other times unclear etiology     At this time she meets the criteria for next line therapies to include PTNS, intravescial botulinum toxin, and sacral neuromodulation.  She is leaning toward the least invasive approach.  Will need to obtain a PA    Maribell Kwong MD MPH   of Urology    CC  Patient Care Team:  Pamela Bobby DO as PCP - General (Internal Medicine)  Chantale Can MD as Assigned PCP                  "

## 2019-04-24 NOTE — LETTER
"4/24/2019       RE: Ashanti Jarvis  [address]  [facility] [address]     Dear Colleague,    Thank you for referring your patient, Ashanti Jarvis, to the [facility] [place] at [facility]. Please see a copy of my visit note below.    April 24, 2019    Return visit    Patient returns today for follow up for cystoscopy given that her urinary symptoms have not responded to anticholinergics, mirabegron or physical therapy.  She is currently taking the antibiotics as prescribed for the positive urine culture last week.  She denies any changes in her health since last visit.    /74   Pulse 78   Ht 1.6 m (5' 3\")   Wt 63.5 kg (140 lb)   BMI 24.80 kg/m     She is comfortable, in no distress, non-labored breathing.  Abdomen is soft, non-tender, non-distended.  Normal external female genitalia.  Negative CST.  Pelvic exam is remarkable for inability to localize pelvic floor musculature    Cystoscopy Note: After informed consent was obtained patient was prepped and draped in the standard fashion.  The flexible cystoscope was inserted into a normal appearing urethral meatus.  The urothelium was carefully examined and there was some trabeculation but no obvious tumors, masses, stones, foreign bodies, or other urothelial abnormalities noted.  Bilateral ureteral orifices were noted in the normal orthotopic position and both effluxed clear urine.  The cystoscope was retroflexed and the bladder neck was unremarkable.  The urethra was carefully examined upon removing the cystoscope and was unremarkable.  Patient tolerated the procedure without complications noted.      A/P: 78 year old F with urinary incontinence some of urgency related, other times unclear etiology     At this time she meets the criteria for next line therapies to include PTNS, intravescial botulinum toxin, and sacral neuromodulation.  She is leaning toward " the least invasive approach.  Will need to obtain a PA    Maribell Kwong MD MPH   of Urology    CC  Patient Care Team:  Pamela Bobby DO as PCP - General (Internal Medicine)  Chantale Can MD as Assigned PCP

## 2019-04-24 NOTE — PATIENT INSTRUCTIONS

## 2019-05-10 ENCOUNTER — TELEPHONE (OUTPATIENT)
Dept: UROLOGY | Facility: CLINIC | Age: 79
End: 2019-05-10

## 2019-05-10 NOTE — TELEPHONE ENCOUNTER
Spoke with Pat and she is interested in going ahead with the PTNS treatments. Will review insurance requirements and get back to her. She expressed understanding.    Maribell Kwong MD Hanson, Cynthia L, RN             Will you please contact the patient and to see if she is still interested in us doing a PA for PTNS? Thanks

## 2019-06-20 DIAGNOSIS — E78.5 HYPERLIPIDEMIA, UNSPECIFIED HYPERLIPIDEMIA TYPE: ICD-10-CM

## 2019-06-20 DIAGNOSIS — I10 ESSENTIAL HYPERTENSION, BENIGN: Chronic | ICD-10-CM

## 2019-06-20 RX ORDER — ATORVASTATIN CALCIUM 20 MG/1
20 TABLET, FILM COATED ORAL EVERY EVENING
Qty: 90 TABLET | Refills: 0 | Status: SHIPPED | OUTPATIENT
Start: 2019-06-20 | End: 2019-09-20

## 2019-06-20 NOTE — TELEPHONE ENCOUNTER
"Last Written Prescription Date:  7/26/18  Last Fill Quantity: 90 tablet,  # refills: 3   Last office visit: 7/20/2018 with prescribing provider:  ANABELLE Bobby   Future Office Visit:      Requested Prescriptions   Pending Prescriptions Disp Refills     atorvastatin (LIPITOR) 20 MG tablet [Pharmacy Med Name: ATORVASTATIN 20MG TABS] 90 tablet 2     Sig: TAKE ONE TABLET BY MOUTH EVERY DAY IN THE EVENING       Statins Protocol Passed - 6/20/2019 12:17 PM        Passed - LDL on file in past 12 months     Recent Labs   Lab Test 07/24/18  0954   *             Passed - No abnormal creatine kinase in past 12 months     No lab results found.             Passed - Recent (12 mo) or future (30 days) visit within the authorizing provider's specialty     Patient had office visit in the last 12 months or has a visit in the next 30 days with authorizing provider or within the authorizing provider's specialty.  See \"Patient Info\" tab in inbasket, or \"Choose Columns\" in Meds & Orders section of the refill encounter.              Passed - Medication is active on med list        Passed - Patient is age 18 or older        Passed - No active pregnancy on record        Passed - No positive pregnancy test in past 12 months          "

## 2019-06-20 NOTE — TELEPHONE ENCOUNTER
Medication is being filled for 1 time refill only due to:  due for annual fasting physical in July.  Pt notified.       Mary REYEZ RN,BSN

## 2019-06-21 RX ORDER — METOPROLOL SUCCINATE 50 MG/1
50 TABLET, EXTENDED RELEASE ORAL DAILY
Qty: 90 TABLET | Refills: 0 | Status: SHIPPED | OUTPATIENT
Start: 2019-06-21 | End: 2019-09-26

## 2019-06-21 NOTE — TELEPHONE ENCOUNTER
"Requested Prescriptions   Pending Prescriptions Disp Refills     metoprolol succinate ER (TOPROL-XL) 50 MG 24 hr tablet 90 tablet 3     Sig: Take 1 tablet (50 mg) by mouth daily       Beta-Blockers Protocol Passed - 6/20/2019  7:44 PM        Passed - Blood pressure under 140/90 in past 12 months     BP Readings from Last 3 Encounters:   04/24/19 130/74   04/17/19 140/88   02/13/19 136/72                 Passed - Patient is age 6 or older        Passed - Recent (12 mo) or future (30 days) visit within the authorizing provider's specialty     Patient had office visit in the last 12 months or has a visit in the next 30 days with authorizing provider or within the authorizing provider's specialty.  See \"Patient Info\" tab in inbasket, or \"Choose Columns\" in Meds & Orders section of the refill encounter.              Passed - Medication is active on med list        Prescription approved per [facility] Refill Protocol.  Brionna TONEY RN    "

## 2019-07-30 ENCOUNTER — TRANSFERRED RECORDS (OUTPATIENT)
Dept: HEALTH INFORMATION MANAGEMENT | Facility: CLINIC | Age: 79
End: 2019-07-30

## 2019-08-26 ENCOUNTER — ALLIED HEALTH/NURSE VISIT (OUTPATIENT)
Dept: PHARMACY | Facility: CLINIC | Age: 79
End: 2019-08-26
Payer: COMMERCIAL

## 2019-08-26 DIAGNOSIS — M81.0 SENILE OSTEOPOROSIS: ICD-10-CM

## 2019-08-26 DIAGNOSIS — M06.9 RHEUMATOID ARTHRITIS, INVOLVING UNSPECIFIED SITE, UNSPECIFIED RHEUMATOID FACTOR PRESENCE: ICD-10-CM

## 2019-08-26 DIAGNOSIS — N39.41 URGE URINARY INCONTINENCE: Primary | ICD-10-CM

## 2019-08-26 DIAGNOSIS — E78.5 HYPERLIPIDEMIA, UNSPECIFIED HYPERLIPIDEMIA TYPE: ICD-10-CM

## 2019-08-26 DIAGNOSIS — Z79.890 HORMONE REPLACEMENT THERAPY (HRT): ICD-10-CM

## 2019-08-26 DIAGNOSIS — R68.2 DRY MOUTH: ICD-10-CM

## 2019-08-26 DIAGNOSIS — I10 ESSENTIAL HYPERTENSION, BENIGN: ICD-10-CM

## 2019-08-26 DIAGNOSIS — M51.9 LUMBAR DISC DISEASE: ICD-10-CM

## 2019-08-26 PROCEDURE — 99605 MTMS BY PHARM NP 15 MIN: CPT | Performed by: PHARMACIST

## 2019-08-26 PROCEDURE — 99607 MTMS BY PHARM ADDL 15 MIN: CPT | Performed by: PHARMACIST

## 2019-08-26 NOTE — Clinical Note
Hi Dr. Can, We completed a chart review for Pat per her insurance company's request. The only follow-up we recommended for the patient was that she receive a fasting lipid panel (last completed in July 2018). We do need to hear back from you about our recommendations, so please let us know your thoughts! Thank you, Saundra Gentile PharmDMedication Therapy Management ResidentPager: [phone] Villavicencio PharmD, BCACPMedication Therapy Management ProviderPager: [phone]

## 2019-08-26 NOTE — PROGRESS NOTES
"SUBJECTIVE/OBJECTIVE:                                                    Ashanti Jarvis is a 78 year old female referred to [facility] by her insurance company, [employer]. Pat declined an MTM appointment, but requested the [facility] pharmacist complete a chart review and discuss it with her provider.      Per Epic Chart:   Allergies   Allergen Reactions     Vancomycin Rash     \"like lizard skin all over my body\"     Tobacco: History of tobacco dependence - quit over 50 years ago   Alcohol: 1-3 beverages / week per recent note    PMH: Reviewed in Epic    Urinary Incontinence: Pt has failed to respond to anticholinergics, mirabegron, and physical therapy. She completed a 7 day course of nitrofurantoin in April for a UTI. Urology is following her and patient is hoping to receive PTNS treatment for her urinary symptoms.     Dry Mouth: Current regimen includes: cevimeline 30 mg TID, dexamethasone 0.5mg/5mL elixir every day prn (2 weeks at a time), and Rhodus mouthwash every day prn. In the past she has reported issues remembering her cevimeline three times daily, but otherwise has had good symptom control on this regimen.     Hypertension: Current medications include metoprolol succinate 50 mg every day.  Patient does not self-monitor BP.    BP Readings from Last 3 Encounters:   04/24/19 130/74   04/17/19 140/88   02/13/19 136/72     Hyperlipidemia: Current therapy includes Atorvastatin 20mg once daily.    The 10-year ASCVD risk score (Winnebagotrini REYES Jr., et al., 2013) is: 24.4%    Values used to calculate the score:      Age: 78 years      Sex: Female      Is Non- : No      Diabetic: No      Tobacco smoker: No      Systolic Blood Pressure: 130 mmHg      Is BP treated: Yes      HDL Cholesterol: 62 mg/dL      Total Cholesterol: 277 mg/dL     Recent Labs   Lab Test 07/24/18  0954 07/17/13  0848 01/06/12  0951   CHOL 277* 229* 244*   HDL 62 62 68   * 141* 153*   TRIG 143 133 116   CHOLHDLRATIO  --  3.7 " 3.6     Osteoporosis: Current therapy includes: calcium-magnesium-zinc 167-83-8mg, Vitamin D supplements: 1000 international unit(s) once daily, and MVI; previous therapy includes Reclast until 2015 drug holiday per rheum.   Last vitamin D level: None in epic  DEXA History: Last DEXA scan was earlier this year, per note from visit with Dr. Vandana Merino on 7/30/19.  Risk factors: post-menopausal  family history of osteoporosis    Rheumatoid Arthritis: Current medications include: leflunomide 20 mg tablet every day. In the past, patient has reported well controlled symptoms since starting leflunomide and no side effects.     HRT: Current medications include: premarin 0.625 mg tablet every day. Pt has been taking this since partial hysterectomy (ovaries still in place). She was previously educated on potential risks of treatment such as MI/stroke/thromboembolism but wanted to continue taking.    Low Back Pain: Patient had severe low back pain in early 2018 that was determined to be related to scoliosis. Pt was referred to PT which she reported helped her tremendously. She has received epidural steroid injections to help with back pain which also helped.     ASSESSMENT:                                                       Current medications were reviewed with her PCP. Medicare Part D topics discussed:Lab monitoring    Urinary Incontinence: Needs improvement. Patient would benefit from continuing to follow with urology for potential PTNS treatment.     Dry Mouth: Stable. Continue trying to get 3 daily doses of cevimeline.     Hypertension: Stable. Patient is meeting BP goal of <140/90mmHg.      Hyperlipidemia: Stable. Patient is due for fasting labs.     Osteoporosis: Stable. Per Dr. Vandana Merino (rheumatologist), Last DEXA scan was reasonably stabe without significant worsening since stopping bisphosphonates.     Rheumatoid Arthritis: Stable.     HRT: Stable.     Low Back Pain: Stable. Patient would benefit from  seeking another epidural steroid injection if her pain worsens.     PLAN:                                                      The following items were discussed with Ashanti's primary care provider: These considerations have not been communicated with the patient.    1. Continue to follow urology to seek PTNS treatment for urinary incontinence.   2. Due for a fasting lipid panel.     The patient is due for the following Health Maintenance items:  Fasting Lipid Panel    I spent 60 minutes completing the chart review and discussing the findings with the primary care provider (an extra 15 minutes was spent creating the Medication Action Plan).    Saundra Gentile PharmD  Medication Therapy Management Resident  Pager: [phone]    Lucero Villavicencio PharmD, [facility]  Medication Therapy Management Provider  Pager: [phone]

## 2019-08-26 NOTE — LETTER
"     [facility]     Date: 2019    Ashanti Jarvis  [address]   [facility] [address]    Dear Dr. Chantale Can,    Thank you for talking with me on 2019 about your health and medications. Medicare s MTM (Medication Therapy Management) program helps you understand your medications and use them safely.      This letter includes an action plan (Medication Action Plan) and medication list (Personal Medication List). The action plan has steps you should take to help you get the best results from your medications. The medication list will help you keep track of your medications and how to use them the right way.       Have your action plan and medication list with you when you talk with your doctors, pharmacists, and other healthcare providers in your care team.     Ask your doctors, pharmacists, and other healthcare providers to update the action plan and medication list at every visit.     Take your medication list with you if you go to the hospital or emergency room.     Give a copy of the action plan and medication list to your family or caregivers.     If you want to talk about this letter or any of the papers with it, please call   [phone].   We look forward to working with you, your doctors, and other healthcare providers to help you stay healthy.     Sincerely,    Saundra Gentile [facility]      Enclosed: Medication Action Plan and Personal Medication List    MEDICATION ACTION PLAN FOR Ashanti Jarvis,  1940     This action plan will help you get the best results from your medications if you:   1. Read \"What we talked about.\"   2. Take the steps listed in the \"What I need to do\" boxes.   3. Fill in \"What I did and when I did it.\"   4. Fill in \"My follow-up plan\" and \"Questions I want to ask.\"     Have this action plan with you when you talk with your doctors, pharmacists, and other healthcare providers in your care team. Share this with your family " or caregivers too.  DATE PREPARED: 2019  What we talked about: obtaining a fasting lipid panel                                                  What I need to do: order a fasting lipid panel       What I did and when I did it:                                              My follow-up plan:                 Questions I want to ask:              If you have any questions about your action plan, call Saundra Gentile [facility], Phone: [phone] , Monday-Friday 8-4:30pm.           MEDICATION LIST FOR ANDREE Moon 1940     This medication list was made for you after we talked. We also used information from your doctor's chart.      Use blank rows to add new medications. Then fill in the dates you started using them.    Cross out medications when you no longer use them. Then write the date and why you stopped using them.    Ask your doctors, pharmacists, and other healthcare providers to update this list at every visit. Keep this list up-to-date with:       Prescription medications    Over the counter drugs     Herbals    Vitamins    Minerals      If you go to the hospital or emergency room, take this list with you. Share this with your family or caregivers too.     DATE PREPARED: 2019  Allergies or side effects: Vancomycin     Medication:  ATORVASTATIN CALCIUM 20 MG PO TABS      How I use it:  Take 1 tablet (20 mg) by mouth every evening Due for fasting appointment in July.. Please schedule: [phone]      Why I use it: Hyperlipidemia, unspecified hyperlipidemia type    Prescriber:  Chantale Can MD      Date I started using it:       Date I stopped using it:         Why I stopped using it:            Medication:  CALCIUM-MAGNESIUM-ZINC 167-83-8 MG PO TABS      How I use it:  Take 1 tablet by mouth daily      Why I use it:  Osteoporosis    Prescriber:  Patient Reported      Date I started using it:       Date I stopped using it:         Why I stopped using it:            Medication:   CEVIMELINE HCL 30 MG PO CAPS      How I use it:  Take 30 mg by mouth 3 times daily       Why I use it:  Dry mouth    Prescriber:  Patient Reported      Date I started using it:       Date I stopped using it:         Why I stopped using it:            Medication:  DEXAMETHASONE 0.5 MG/5ML PO ELIX      How I use it:  Swish and spit in mouth daily as needed (2 weeks at a time)       Why I use it:  Dry mouth    Prescriber:  Patient Reported      Date I started using it:       Date I stopped using it:         Why I stopped using it:            Medication:  LEFLUNOMIDE 20 MG PO TABS      How I use it:  Take 1 tablet by mouth daily      Why I use it:  Rheumatoid Arthritis    Prescriber:  Patient Reported      Date I started using it:       Date I stopped using it:         Why I stopped using it:            Medication:  METOPROLOL SUCCINATE ER 50 MG PO TB24      How I use it:  Take 1 tablet (50 mg) by mouth daily      Why I use it: Essential hypertension, benign    Prescriber:  Chantale Can MD      Date I started using it:       Date I stopped using it:         Why I stopped using it:            Medication:  NITROFURANTOIN MONOHYD MACRO 100 MG PO CAPS      How I use it:  Take 1 capsule (100 mg) by mouth 2 times daily      Why I use it: Urinary tract infection    Prescriber:  Maribell Kwong MD      Date I started using it:       Date I stopped using it:         Why I stopped using it:            Medication:  PREMARIN 0.625 MG PO TABS      How I use it:  TAKE ONE TABLET BY MOUTH EVERY DAY      Why I use it: Menopause    Prescriber:  Chantale Can MD      Date I started using it:       Date I stopped using it:         Why I stopped using it:            Medication:  PROBIOTIC FORMULA PO      How I use it:  Take  by mouth daily.      Why I use it:  Gut Health    Prescriber:  Patient Reported      Date I started using it:       Date I stopped using it:         Why I stopped using it:            Medication:   THERA M PLUS PO TABS      How I use it:  Take 1 tablet by mouth daily      Why I use it:  General Health    Prescriber:  Gracie Flood MD      Date I started using it:       Date I stopped using it:         Why I stopped using it:            Medication:  UNABLE TO FIND      How I use it:  MEDICATION NAME: Rhodus mouthwash swish and spit daily as needed      Why I use it:  Dry mouth    Prescriber:  Patient Reported      Date I started using it:       Date I stopped using it:         Why I stopped using it:            Medication:  VITAMIN D (CHOLECALCIFEROL) 1000 UNITS PO CAPS      How I use it:  Take 1 capsule by mouth daily      Why I use it:  Osteoporosis    Prescriber:  Patient Reported      Date I started using it:       Date I stopped using it:         Why I stopped using it:            Medication:         How I use it:         Why I use it:      Prescriber:         Date I started using it:       Date I stopped using it:         Why I stopped using it:            Medication:         How I use it:         Why I use it:      Prescriber:         Date I started using it:       Date I stopped using it:         Why I stopped using it:            Medication:         How I use it:         Why I use it:      Prescriber:         Date I started using it:       Date I stopped using it:         Why I stopped using it:              Other Information:     If you have any questions about your action plan, call [phone].    According to the Paperwork Reduction Act of 1995, no persons are required to respond to a collection of information unless it displays a valid OMB control number. The valid OMB number for this information collection is [id number]. The time required to complete this information collection is estimated to average 40 minutes per response, including the time to review instructions, searching existing data resources, gather the data needed, and complete and review the information collection. If you have any  comments concerning the accuracy of the time estimate(s) or suggestions for improving this form, please write to: CMS, Attn: [employer] Clearance Officer, [address] [place] [address], [place], [place] [address].

## 2019-09-04 DIAGNOSIS — E78.5 HYPERLIPIDEMIA, UNSPECIFIED HYPERLIPIDEMIA TYPE: ICD-10-CM

## 2019-09-04 LAB
CHOLEST SERPL-MCNC: 188 MG/DL
HDLC SERPL-MCNC: 70 MG/DL
LDLC SERPL CALC-MCNC: 90 MG/DL
NONHDLC SERPL-MCNC: 118 MG/DL
TRIGL SERPL-MCNC: 140 MG/DL

## 2019-09-04 PROCEDURE — 36415 COLL VENOUS BLD VENIPUNCTURE: CPT | Performed by: INTERNAL MEDICINE

## 2019-09-04 PROCEDURE — 80061 LIPID PANEL: CPT | Performed by: INTERNAL MEDICINE

## 2019-09-20 DIAGNOSIS — E78.5 HYPERLIPIDEMIA, UNSPECIFIED HYPERLIPIDEMIA TYPE: ICD-10-CM

## 2019-09-20 DIAGNOSIS — Z78.0 MENOPAUSE: ICD-10-CM

## 2019-09-20 RX ORDER — ATORVASTATIN CALCIUM 20 MG/1
20 TABLET, FILM COATED ORAL DAILY
Qty: 90 TABLET | Refills: 0 | Status: SHIPPED | OUTPATIENT
Start: 2019-09-20 | End: 2020-01-13

## 2019-09-20 RX ORDER — CONJUGATED ESTROGENS 0.62 MG/1
TABLET, FILM COATED ORAL
Qty: 90 TABLET | Refills: 0 | Status: SHIPPED | OUTPATIENT
Start: 2019-09-20 | End: 2019-11-13

## 2019-09-20 NOTE — TELEPHONE ENCOUNTER
"Last Rx - 6/20/19 Atorvastatin 20mg - #90 with 0 refills     Last Rx - 4/8/19 Premarin 0.625 #90 with 1 refill     Last OV 12/10/18 with PCP     Prescriptions approved per [facility] Refill [facility].  Brionna TONEY RN        Requested Prescriptions   Pending Prescriptions Disp Refills     PREMARIN 0.625 MG tablet [Pharmacy Med Name: PREMARIN 0.625MG TABS] 90 tablet 1     Sig: TAKE ONE TABLET BY MOUTH EVERY DAY       Hormone Replacement Therapy Passed - 9/20/2019  6:42 AM        Passed - Blood pressure under 140/90 in past 12 months     BP Readings from Last 3 Encounters:   04/24/19 130/74   04/17/19 140/88   02/13/19 136/72                 Passed - Recent (12 mo) or future (30 days) visit within the authorizing provider's specialty     Patient had office visit in the last 12 months or has a visit in the next 30 days with authorizing provider or within the authorizing provider's specialty.  See \"Patient Info\" tab in inbasket, or \"Choose Columns\" in Meds & Orders section of the refill encounter.              Passed - Medication is active on med list        Passed - Patient is 18 years of age or older        Passed - No active pregnancy on record        Passed - No positive pregnancy test on record in past 12 months        atorvastatin (LIPITOR) 20 MG tablet [Pharmacy Med Name: ATORVASTATIN CALCIUM 20MG TABS] 90 tablet 0     Sig: TAKE ONE TABLET BY MOUTH EVERY EVENING. DUE FOR FASTING APPOINTMENT IN JULY. PLEASE SCHEDULE: [phone].       Statins Protocol Passed - 9/20/2019  6:42 AM        Passed - LDL on file in past 12 months     Recent Labs   Lab Test 09/04/19  0902   LDL 90             Passed - No abnormal creatine kinase in past 12 months     No lab results found.             Passed - Recent (12 mo) or future (30 days) visit within the authorizing provider's specialty     Patient had office visit in the last 12 months or has a visit in the next 30 days with authorizing provider or within the authorizing provider's " "specialty.  See \"Patient Info\" tab in inbasket, or \"Choose Columns\" in Meds & Orders section of the refill encounter.              Passed - Medication is active on med list        Passed - Patient is age 18 or older        Passed - No active pregnancy on record        Passed - No positive pregnancy test in past 12 months          "

## 2019-10-01 ENCOUNTER — HEALTH MAINTENANCE LETTER (OUTPATIENT)
Age: 79
End: 2019-10-01

## 2019-10-09 ENCOUNTER — OFFICE VISIT (OUTPATIENT)
Dept: FAMILY MEDICINE | Facility: CLINIC | Age: 79
End: 2019-10-09
Payer: MEDICARE

## 2019-10-09 ENCOUNTER — DOCUMENTATION ONLY (OUTPATIENT)
Dept: CARE COORDINATION | Facility: CLINIC | Age: 79
End: 2019-10-09

## 2019-10-09 VITALS
BODY MASS INDEX: 26.4 KG/M2 | HEIGHT: 63 IN | SYSTOLIC BLOOD PRESSURE: 149 MMHG | TEMPERATURE: 98.2 F | WEIGHT: 149 LBS | OXYGEN SATURATION: 94 % | DIASTOLIC BLOOD PRESSURE: 87 MMHG | HEART RATE: 66 BPM

## 2019-10-09 DIAGNOSIS — R32 URINARY INCONTINENCE, UNSPECIFIED TYPE: ICD-10-CM

## 2019-10-09 DIAGNOSIS — Z00.00 ENCOUNTER FOR MEDICARE ANNUAL WELLNESS EXAM: Primary | ICD-10-CM

## 2019-10-09 DIAGNOSIS — M41.9 SCOLIOSIS, UNSPECIFIED SCOLIOSIS TYPE, UNSPECIFIED SPINAL REGION: ICD-10-CM

## 2019-10-09 DIAGNOSIS — M51.9 LUMBAR DISC DISEASE: Chronic | ICD-10-CM

## 2019-10-09 PROCEDURE — G0439 PPPS, SUBSEQ VISIT: HCPCS | Performed by: INTERNAL MEDICINE

## 2019-10-09 ASSESSMENT — MIFFLIN-ST. JEOR: SCORE: 1134.99

## 2019-10-09 ASSESSMENT — ACTIVITIES OF DAILY LIVING (ADL): CURRENT_FUNCTION: NO ASSISTANCE NEEDED

## 2019-10-09 NOTE — PATIENT INSTRUCTIONS
Patient Education   Personalized Prevention Plan  You are due for the preventive services outlined below.  Your care team is available to assist you in scheduling these services.  If you have already completed any of these items, please share that information with your care team to update in your medical record.  Health Maintenance Due   Topic Date Due     Zoster (Shingles) Vaccine (1 of 2) 10/23/1992     Annual Wellness Visit  06/19/2016     Discuss Advance Care Planning  11/26/2017     Mammogram  07/24/2019       Exercise for a Healthier Heart     Exercise with a friend. When activity is fun, you're more likely to stick with it.   You may wonder how you can improve the health of your heart. If you re thinking about exercise, you re on the right track. You don t need to become an athlete, but you do need a certain amount of brisk exercise to help strengthen your heart. If you have been diagnosed with a heart condition, your doctor may recommend exercise to help stabilize your condition. To help make exercise a habit, choose safe, fun activities.  Be sure to check with your healthcare provider before starting an exercise program.   Why exercise?  Exercising regularly offers many healthy rewards. It can help you do all of the following:    Improve your blood cholesterol level to help prevent further heart trouble    Lower your blood pressure to help prevent a stroke or heart attack    Control diabetes, or reduce your risk of getting this disease    Improve your heart and lung function    Reach and maintain a healthy weight    Make your muscles stronger and more limber so you can stay active    Prevent falls and fractures by slowing the loss of bone mass (osteoporosis)    Manage stress better    Reduce your blood pressure    Improve your sense of self and your body image  Exercise tips  Ease into your routine. Set small goals. Then build on them.  Exercise on most days. Aim for a total of 150 or more minutes of  moderate to  vigorous intensity activity each week. Consider 40 minutes, 3 to 4 times a week. For best results, activity should last for 40 minutes on average. It is OK to work up to the 40 minute period over time. Examples of moderate-intensity activity is walking 1 mile in 15 minutes or 30 to 45 minutes of yard work.  Step up your daily activity level. Along with your exercise program, try being more active throughout the day. Walk instead of drive. Do more household tasks or yard work.  Choose one or more activities you enjoy. Walking is one of the easiest things you can do. You can also try swimming, riding a bike, dancing, or taking an exercise class.  Stop exercising and call your doctor if you:    Have chest pain or feel dizzy or lightheaded    Feel burning, tightness, pressure, or heaviness in your chest, neck, shoulders, back, or arms    Have unusual shortness of breath    Have increased joint or muscle pain    Have palpitations or an irregular heartbeat   Date Last Reviewed: 5/1/2016 2000-2018 [employer]. [address] [place] [address]. All rights reserved. This information is not intended as a substitute for professional medical care. Always follow your healthcare professional's instructions.          Signs of Hearing Loss     Hearing much better with one ear can be a sign of hearing loss.     Hearing loss is a problem shared by many people. In fact, it is one of the most common health conditions, particularly as people age. Most people over age 65 have some hearing loss, and by age 80, almost everyone does. Because hearing loss usually occurs slowly over the years, you may not realize your hearing ability has gotten worse.  Have your hearing checked  Contact your healthcare provider if you:    Have to strain to hear normal conversation    Have to watch other people s faces very carefully to follow what they re saying    Need to ask people to repeat what they ve said    Often  misunderstand what people are saying    Turn the volume of the television or radio up so high that others complain    Feel that people are mumbling when they re talking to you    Find that the effort to hear leaves you feeling tired and irritated    Notice, when using the phone, that you hear better with one ear than the other  Date Last Reviewed: 12/1/2016 2000-2018 [employer]. [address] [place] [address]. All rights reserved. This information is not intended as a substitute for professional medical care. Always follow your healthcare professional's instructions.          Urinary Incontinence, Female (Adult)  Urinary incontinence means loss of control of the bladder. This problem affects many women, especially as they get older. If you have incontinence, you may be embarrassed to ask for help. But know that this problem can be treated.  Types of Incontinence  There are different types of incontinence. Two of the main types are described here. You can have more than one type.    Stress incontinence. With this type, urine leaks when pressure (stress) is put on the bladder. This may happen when you cough, sneeze, or laugh. Stress incontinence most often occurs because the pelvic floor muscles that support the bladder and urethra are weak. This can happen after pregnancy and vaginal childbirth or a hysterectomy. It can also be due to excess body weight or hormone changes.    Urge incontinence (also called overactive bladder). With this type, a sudden urge to urinate is felt often. This may happen even though there may not be much urine in the bladder. The need to urinate often during the night is common. Urge incontinence most often occurs because of bladder spasms. This may be due to bladder irritation or infection. Damage to bladder nerves or pelvic muscles, constipation, and certain medicines can also lead to urge incontinence.  Treatment of urinary incontinence depends on the cause.  Further evaluation is needed to find the type you have. This will likely include an exam and certain tests. Based on the results, you and your healthcare provider can then plan treatment. Until a diagnosis is made, the home care tips below can help relieve symptoms.  Home care    Do pelvic floor muscle exercises, if they are prescribed. The pelvic floor muscles help support the bladder and urethra. Many women find that their symptoms improve when doing special exercises that strengthen these muscles. To do the exercises contract the muscles you would use to stop your stream of urine, but do this when you re not urinating. Hold for 10 seconds, then relax. Repeat 10 to 20 times in a row, at least 3 times a day. Your provider may give you other instructions for how to do the exercises and how often.    Keep a bladder diary. This helps track how often and how much you urinate over a set period of time. Bring this diary with you to your next visit with the provider. The information can help your provider learn more about your bladder problem.    Lose weight, if advised to by your provider. Excess weight puts pressure on the bladder. Your provider can help you create a weight-loss plan that s right for you. This may include exercising more and making certain diet changes.    Don't consume foods and drinks that may irritate the bladder. These can include alcohol and caffeinated drinks.    Quit smoking. Smoking and other tobacco use can lead to chronic cough that strains the pelvic floor muscles. Smoking may also damage the bladder and urethra. Talk with your provider about treatments or methods you can use to quit smoking.    If drinking large amounts of fluid causes you to have symptoms, you may be advised to limit your fluid intake. You may also be advised to drink most of your fluids during the day and to limit fluids at night.    If you re worried about urine leakage or accidents, you may wear absorbent pads to catch  urine. Change the pads often. This helps reduce discomfort. It may also reduce the risk of skin or bladder infections.  Follow-up care  Follow up with your healthcare provider, or as directed. It may take some to find the right treatment for your problem. Your treatment plan may include special therapies or medicines. Certain procedures or surgery may also be options. Be sure to discuss any questions you have with your provider.  When to seek medical advice  Call the healthcare provider right away if any of these occur:    Fever of 100.4 F (38 C) or higher, or as directed by your provider    Bladder pain or fullness    Abdominal swelling    Nausea or vomiting    Back pain    Weakness, dizziness or fainting  Date Last Reviewed: 10/1/2017    2300-8145 [employer]. [address] [place] [address], [place] [address]. All rights reserved. This information is not intended as a substitute for professional medical care. Always follow your healthcare professional's instructions.

## 2019-10-09 NOTE — PROGRESS NOTES
She is at risk for lack of exercise and has been provided with information to increase physical activity for the benefit of her well-being.  The patient was provided with written information regarding signs of hearing loss.  Information on urinary incontinence and treatment options given to patient.

## 2019-10-09 NOTE — PROGRESS NOTES
"SUBJECTIVE:   Ashanti Jarvis is a 78 year old female who presents for Preventive Visit.    Are you in the first 12 months of your Medicare coverage?  No    Healthy Habits:     In general, how would you rate your overall health?  Good    Frequency of exercise:  None    Duration of exercise:  Other    Do you usually eat at least 4 servings of fruit and vegetables a day, include whole grains    & fiber and avoid regularly eating high fat or \"junk\" foods?  Yes    Taking medications regularly:  Yes    Barriers to taking medications:  None    Medication side effects:  None    Ability to successfully perform activities of daily living:  No assistance needed    Home Safety:  No safety concerns identified    Hearing Impairment:  Difficulty following a conversation in a noisy restaurant or crowded room, feel that people are mumbling or not speaking clearly, difficulty understanding soft or whispered speech and need to ask people to speak up or repeat themselves    In the past 6 months, have you been bothered by leaking of urine? Yes    In general, how would you rate your overall mental or emotional health?  Very good      PHQ-2 Total Score: 0    Additional concerns today:  Yes    Do you feel safe in your environment? Yes    Do you have a Health Care Directive? No: Advance care planning reviewed with patient; information given to patient to review.    Fall risk  Fallen 2 or more times in the past year?: No  Any fall with injury in the past year?: No    Cognitive Screening   1) Repeat 3 items (Leader, Season, Table)    2) Clock draw: NORMAL  3) 3 item recall: Recalls 3 objects  Results: 3 items recalled: COGNITIVE IMPAIRMENT LESS LIKELY    Mini-CogTM Copyright CORNELL Finn. Licensed by the author for use in [facility]; reprinted with permission (ge@.[facility]). All rights reserved.      Do you have sleep apnea, excessive snoring or daytime drowsiness?: no    Reviewed and updated as needed this visit by clinical " staff         Reviewed and updated as needed this visit by Provider        Social History     Tobacco Use     Smoking status: Former Smoker     Packs/day: 0.10     Years: 1.00     Pack years: 0.10     Types: Cigarettes     Last attempt to quit: 1962     Years since quittin.8     Smokeless tobacco: Never Used     Tobacco comment: tried in college   Substance Use Topics     Alcohol use: Yes     Alcohol/week: 3.0 standard drinks     Types: 3 Standard drinks or equivalent per week     Comment:  abt 3 glasses wine per week      If you drink alcohol do you typically have >3 drinks per day or >7 drinks per week? No    Alcohol Use 2015   Prescreen: >3 drinks/day or >7 drinks/week? The patient does not drink >3 drinks per day nor >7 drinks per week.     Current providers sharing in care for this patient include:   Patient Care Team:  Chantale Can MD as PCP - General (Internal Medicine)  Chantale Can MD as Assigned PCP  Lucero Villavicencio RP as Pharmacist (Pharmacist)    The following health maintenance items are reviewed in Epic and correct as of today:  Health Maintenance   Topic Date Due     ZOSTER IMMUNIZATION (1 of 2) 10/23/1992     MEDICARE ANNUAL WELLNESS VISIT  2016     ADVANCE CARE PLANNING  2017     MAMMO SCREENING  2019     INFLUENZA VACCINE (1) 2019     FALL RISK ASSESSMENT  12/10/2019     COLONOSCOPY  2022     DEXA  10/04/2022     DTAP/TDAP/TD IMMUNIZATION (4 - Td) 07/15/2023     LIPID  2024     MIGRAINE ACTION PLAN  Completed     PHQ-2  Completed     PNEUMOCOCCAL IMMUNIZATION 65+ LOW/MEDIUM RISK  Completed     IPV IMMUNIZATION  Aged Out     MENINGITIS IMMUNIZATION  Aged Out     Labs reviewed in EPIC      Review of Systems  Constitutional, HEENT, cardiovascular, pulmonary, GI, , musculoskeletal, neuro, skin, endocrine and psych systems are negative, except as otherwise noted.    OBJECTIVE:   BP (!) 149/87 (BP Location: Left arm, Patient Position:  "Sitting, Cuff Size: Adult Regular)   Pulse 66   Temp 98.2  F (36.8  C) (Oral)   Ht 1.6 m (5' 3\")   Wt 67.6 kg (149 lb)   SpO2 94%   BMI 26.39 kg/m   Estimated body mass index is 24.8 kg/m  as calculated from the following:    Height as of 4/24/19: 1.6 m (5' 3\").    Weight as of 4/24/19: 63.5 kg (140 lb).  Physical Exam  GENERAL APPEARANCE: healthy, alert and no distress  EYES: Eyes grossly normal to inspection, PERRL and conjunctivae and sclerae normal  HENT: ear canals and TM's normal, nose and mouth without ulcers or lesions, oropharynx clear and oral mucous membranes moist  NECK: no adenopathy, no asymmetry, masses, or scars and thyroid normal to palpation  RESP: lungs clear to auscultation - no rales, rhonchi or wheezes  CV: regular rate and rhythm, normal S1 S2, no S3 or S4, no murmur, click or rub, no peripheral edema and peripheral pulses strong  ABDOMEN: soft, nontender, no hepatosplenomegaly, no masses and bowel sounds normal  MS: diffuse lumbar low back pains noted  SKIN: no suspicious lesions or rashes  NEURO: Normal strength and tone, sensory exam grossly normal, mentation intact and speech normal  PSYCH: mentation appears normal and affect normal/bright    Diagnostic Test Results:  Labs reviewed in Epic    ASSESSMENT / PLAN:   (Z00.00) Encounter for Medicare annual wellness exam  (primary encounter diagnosis)  (R32) Urinary incontinence, unspecified type  Comment: chronic urinary incontinence  Plan: Urology referral      (M41.9) Scoliosis, unspecified scoliosis type,  (M51.9) Lumbar disc disease  Comment: chronic low back pains due to lumbar disc disease  Plan: [facility], AND CHIROPRACTIC REFERRAL            End of Life Planning:  Patient currently has an advanced directive: Yes.  Practitioner is supportive of decision.    COUNSELING:  Reviewed preventive health counseling, as reflected in patient instructions  Special attention given to:    Estimated body mass index is 24.8 kg/m  as calculated " "from the following:    Height as of 4/24/19: 1.6 m (5' 3\").    Weight as of 4/24/19: 63.5 kg (140 lb).         reports that she quit smoking about 57 years ago. Her smoking use included cigarettes. She has a 0.10 pack-year smoking history. She has never used smokeless tobacco.      Appropriate preventive services were discussed with this patient, including applicable screening as appropriate for cardiovascular disease, diabetes, osteopenia/osteoporosis, and glaucoma.  As appropriate for age/gender, discussed screening for colorectal cancer, prostate cancer, breast cancer, and cervical cancer. Checklist reviewing preventive services available has been given to the patient.    Reviewed patients plan of care and provided an AVS. The Basic Care Plan (routine screening as documented in Health Maintenance) for Ashanti meets the Care Plan requirement. This Care Plan has been established and reviewed with the Patient.    Counseling Resources:  ATP IV Guidelines  Pooled Cohorts Equation Calculator  Breast Cancer Risk Calculator  FRAX Risk Assessment  ICSI Preventive Guidelines  Dietary Guidelines for Americans, 2010  USDA's MyPlate  ASA Prophylaxis  Lung CA Screening    Chantale Can MD  [facility]    Identified Health Risks:  "

## 2019-11-11 ENCOUNTER — THERAPY VISIT (OUTPATIENT)
Dept: CHIROPRACTIC MEDICINE | Facility: CLINIC | Age: 79
End: 2019-11-11
Payer: MEDICARE

## 2019-11-11 DIAGNOSIS — M99.04 SOMATIC DYSFUNCTION OF SACRAL REGION: ICD-10-CM

## 2019-11-11 DIAGNOSIS — M51.9 LUMBAR DISC DISEASE: Chronic | ICD-10-CM

## 2019-11-11 DIAGNOSIS — M54.50 CHRONIC LEFT-SIDED LOW BACK PAIN WITHOUT SCIATICA: ICD-10-CM

## 2019-11-11 DIAGNOSIS — G89.29 CHRONIC LEFT-SIDED THORACIC BACK PAIN: ICD-10-CM

## 2019-11-11 DIAGNOSIS — M99.02 THORACIC SEGMENT DYSFUNCTION: Primary | ICD-10-CM

## 2019-11-11 DIAGNOSIS — M41.9 SCOLIOSIS, UNSPECIFIED SCOLIOSIS TYPE, UNSPECIFIED SPINAL REGION: ICD-10-CM

## 2019-11-11 DIAGNOSIS — M99.03 SOMATIC DYSFUNCTION OF LUMBAR REGION: ICD-10-CM

## 2019-11-11 DIAGNOSIS — G89.29 CHRONIC LEFT-SIDED LOW BACK PAIN WITHOUT SCIATICA: ICD-10-CM

## 2019-11-11 DIAGNOSIS — M54.6 CHRONIC LEFT-SIDED THORACIC BACK PAIN: ICD-10-CM

## 2019-11-11 PROCEDURE — 99203 OFFICE O/P NEW LOW 30 MIN: CPT | Mod: GA | Performed by: CHIROPRACTOR

## 2019-11-11 PROCEDURE — 98941 CHIROPRACT MANJ 3-4 REGIONS: CPT | Mod: AT | Performed by: CHIROPRACTOR

## 2019-11-12 ENCOUNTER — PRE VISIT (OUTPATIENT)
Dept: UROLOGY | Facility: CLINIC | Age: 79
End: 2019-11-12

## 2019-11-12 PROBLEM — M54.6 CHRONIC LEFT-SIDED THORACIC BACK PAIN: Status: ACTIVE | Noted: 2019-11-12

## 2019-11-12 PROBLEM — M99.03 SOMATIC DYSFUNCTION OF LUMBAR REGION: Status: ACTIVE | Noted: 2019-11-12

## 2019-11-12 PROBLEM — G89.29 CHRONIC LEFT-SIDED LOW BACK PAIN WITHOUT SCIATICA: Status: ACTIVE | Noted: 2018-01-10

## 2019-11-12 PROBLEM — M99.04 SOMATIC DYSFUNCTION OF SACRAL REGION: Status: ACTIVE | Noted: 2019-11-12

## 2019-11-12 PROBLEM — M99.02 THORACIC SEGMENT DYSFUNCTION: Status: ACTIVE | Noted: 2019-11-12

## 2019-11-12 PROBLEM — G89.29 CHRONIC LEFT-SIDED THORACIC BACK PAIN: Status: ACTIVE | Noted: 2019-11-12

## 2019-11-12 PROBLEM — M41.9 SCOLIOSIS, UNSPECIFIED SCOLIOSIS TYPE, UNSPECIFIED SPINAL REGION: Status: ACTIVE | Noted: 2019-11-12

## 2019-11-12 NOTE — TELEPHONE ENCOUNTER
Reason for visit: incontinence follow up     Relevant information: pt was referred for PTNS    Records/imaging/labs/orders: no PTNS records    Pt called: no need for a call    At Rooming: regular

## 2019-11-12 NOTE — PROGRESS NOTES
Initial Chiropractic Clinic Visit    PCP: Chantale Can    Ashanti Jarvis is a 79 year old female who is seen  in consultation at the request of  Chantale Can M.D. presenting with chronic mid to low back pain from scoliosis . Patient reports that the onset was over 30 years.  When asked, patient denies:, falling, slipping, bending and reaching or sleeping awkwardly. The pt reports progressing scoliosis from OA. In the past several years she notes an increase in L sided flank pain that is affecting sitting, walking and standing. She grades the px a 1-7/10 on VAS. The pain is located in the mid back and L flan kin addition to the sacral area. The pt has been dx with more than 20 degrees of clinical scoliosis. She has had spinal injections 4 times with no significant improvement. The pt has had PT with some success. She would like to try ACP and Chiropractic. The pt denies weakness in the extremities or other unusual sx.  Prior to onset, the patient was able to stand for one hour. Patient notes that due to symptoms, they can only stand for 10 minutes due to pain. Ashanti Jarvis notes   standing rated at a 7/10 painful and prior to this incident it was 1/10.        Injury: There was no acute injury related to this episode     Location of Pain: left  flank and bilateral  mid to low back pain  at the following level(s) T4 , T8 , T10, L5 , Sacrum  and PSIS Left   Duration of Pain: over 30 years however more recently in the past 2 years    Rating of Pain at worst: 8/10  Rating of Pain Currently: 1/10  Symptoms are better with: Nothing-injections   Symptoms are worse with: standing and walking   Additional Features: none      Health History  as reported by the patient:    How does the patient rate their own health:   Good    Current or past medical history:   High blood pressure, Incontinence, Osteoarthritis and Rheumatoid arthritis    Medical allergies  Vancomycin     Past  Traumas/Surgeries  None    Family History  Family History   Problem Relation Age of Onset     Osteoporosis Mother      Cancer Father         Lung CA        Medications:  High blood pressure and Hormone replacement    Occupation:  None     Primary job tasks:   Other: none     Barriers as home/work:   none    Additional health Issues:           Ashanti was asked to complete the Oswestry Low Back Disability Index and Joseph Start Back screening tool, today in the office.  Disability score: 26%. Keel Start Total Score:3 Sub Score: 2     Review of Systems  Musculoskeletal: as above  Remainder of review of systems is negative including constitutional, CV, pulmonary, GI, Skin and Neurologic except as noted in HPI or medical history.    Past Medical History:   Diagnosis Date     Benign neoplasm of colon 5/01    tubular adenoma; one non-adenomatous polyp 2012 - rec f/u 5 years     Essential hypertension, benign      Essential tremor      Hx estrogen therapy     Since hysterectomy (estimates ~30 years as of Sept 2014)     Intracranial abscess Dec 2012    H/o sinusitis and intracranial abscess     Lichen planus      Migraine, unspecified, without mention of intractable migraine without mention of status migrainosus 1963     Osteoporosis     Reclast 10/11 --> drug holiday 2015 per rheum     Other isolated or specific phobias     Claustrophobia     Other kyphoscoliosis and scoliosis      Pemphigoid, benign mucous membrane      Personal history of urinary calculi      Rheumatoid arthritis(714.0) 1991    Followed by Dr Vnadana Merino at [facility]     Sjogren's syndrome (H)     due to meds     Unspecified tinnitus     Left ear     Urge urinary incontinence      Past Surgical History:   Procedure Laterality Date     APPENDECTOMY       C NONSPECIFIC PROCEDURE  1987    pneumonia     COLONOSCOPY N/A 5/25/2017    Procedure: COLONOSCOPY;  colonoscopy;  Surgeon: Alan Crenshaw MD;  Location:  GI     ENDOSCOPIC  "BALLOON SINUPLASTY ACCLARENT  12/11/2012    Procedure: ENDOSCOPIC BALLOON SINUPLASTY ACCLARENT;  JOSHUA. MAXILLO SINUS SURGERY,  ;  Surgeon: David Ortega MD;  Location:  OR     HYSTERECTOMY, VAGINAL  1983    has ovaries; secondary to fibroids     KERATOTOMY ARCUATE WITH FEMTOSECOND LASER/IMAGING FOR ATIOL Right 1/4/2016    Procedure: KERATOTOMY ARCUATE WITH FEMTOSECOND LASER/IMAGING FOR ATIOL;  Surgeon: Larry Vilchis MD;  Location: [facility]     OPTICAL TRACKING SYSTEM CRANIOTOMY, EXCISE TUMOR, COMBINED  12/12/2012    Procedure: COMBINED OPTICAL TRACKING SYSTEM CRANIOTOMY, EXCISE TUMOR;  LEFT FRONTAL CRANIOTOMY, IMAGE GUIDANCE FROM FERMIN PALENCIA. - SUPINE, DEVLIN TONGS;  Surgeon: Tomi Fernandez MD;  Location:  OR     PHACOEMULSIFICATION CLEAR CORNEA WITH STANDARD INTRAOCULAR LENS IMPLANT Left 12/21/2015    Procedure: PHACOEMULSIFICATION CLEAR CORNEA WITH STANDARD INTRAOCULAR LENS IMPLANT;  Surgeon: Larry Vilchis MD;  Location:  [facility]     PHACOEMULSIFICATION CLEAR CORNEA WITH STANDARD INTRAOCULAR LENS IMPLANT Right 1/4/2016    Procedure: PHACOEMULSIFICATION CLEAR CORNEA WITH STANDARD INTRAOCULAR LENS IMPLANT;  Surgeon: Larry Vilchis MD;  Location: [facility]     Objective  There were no vitals taken for this visit.      GENERAL APPEARANCE: healthy, alert and no distress   GAIT: NORMAL  SKIN: no suspicious lesions or rashes  NEURO: Normal strength and tone, mentation intact and speech normal  PSYCH:  mentation appears normal and affect normal/bright    Low back exam:    Inspection:  \"     no visible deformity in the low back       normal skin\"    ROM:       full flexion       limited extension due to pain    Tender:       paraspinal muscles       B QL, L ABO    Non Tender:       remainder of lumbar spine    Strength:       hip flexion 5/5 Normal       knee extension 5/5 Normal       ankle dorsiflexion 5/5 Normal       ankle plantarflexion 5/5 Normal       dorsiflexion of the great toe 5/5 " Normal    Reflexes:       patellar (L3, L4) 2 bilaterally       achilles tendons (S1) 2 bilaterally    Sensation:      grossly intact throughout lower extremities    Special tests:  Kemps - Right positive, low back pain and Left positive, low back pain, SLR - Right negative and Left negative, Gaenslen's - Right negative and Left negative and Fabere - Right positive, hip and low back pain and Left positive, hip and low back pain    Segmental spinal dysfunction/restrictions found at:  T4 , T8 , T10, L5 , Sacrum  and PSIS Left     The following soft tissue hypotonicities were observed:Quad lumb: bilateral, referred pain: no  T paraspinals: ache and dull pain, no    Trigger points were found in:none     Muscle spasm found in:Lumbar erector spine, Quad lumb and T-spine paraspinal      Radiology:  Bone marrow:    1. Scoliosis convex towards the left.  2. There is moderate-severe left L3-L4 and L4-L5 subarticular, lateral  recess stenosis due to the scoliosis and degenerative changes off the  left facet joint.  3. Moderate-severe left L3-L4, L4-L5, and L5-S1 foraminal stenosis due  to bulging discs and degenerative changes off the left facet joints.                                                                      IMPRESSION:  Normal MRI of the lumbar spine.      RENETTA ANGEL MD    Assessment:    1. Thoracic segment dysfunction    2. Chronic left-sided thoracic back pain    3. Somatic dysfunction of lumbar region    4. Chronic left-sided low back pain without sciatica    5. Somatic dysfunction of sacral region    6. Scoliosis, unspecified scoliosis type, unspecified spinal region    7. Lumbar disc disease        RX ordered/plan of care  Anticipated outcomes  Possible risks and side effects    After discussing the risk and benefits of care, patient consented to treatment    Prognosis: Good      Patient's condition:  Patient had restrictions pre-manipulation    Treatment effectiveness:  Post manipulation there is better  intersegmental movement and Patient claims to feel looser post manipulation      Plan:    Procedures:  Evaluation and Management:  40134 Moderate level exam 30 min    CMT:  73782 Chiropractic manipulative treatment 3-4 regions performed   Thoracic: Diversified and Activator, T4, T8, Prone  Lumbar: Diversified and Activator, L4, Prone, Side posture  Pelvis: Drop Table, Sacrum , PSIS Left , Prone   Anterior pubis: L PSIS drop table     Modalities:  None performed this visit    Therapeutic procedures:  None    Response to Treatment  Reduction in symptoms as reported by patient      Treatment plan and goals:  Goals:  STANDING: the patient specific goal is to attain the pre-injury status of 1 hour comfortably   Walking for 30 minutes   Making her bed in the morning    Frequency of care  Duration of care is estimated to be 6-8 weeks, from the initial treatment.  It is estimated that the patient will need a total of 6-8 visits to resolve this episode.  For the initial therapeutic trial of care, the frequency is recommended at 1 X week, once daily.  A reevaluation would be clinically appropriate in 6-8 visits, to determine progress and further course of care.    In-Office Treatment  Evaluation  Spinal Chiropractic Manipulative Therapy  Acupuncture discussion        Recommendations:    Instructions:  expect soreness     Follow-up:    Return to care in 1 week with ACP.       Discussed the assessment with the patient.      Disclaimer: This note consists of symbols derived from keyboarding, dictation and/or voice recognition software. As a result, there may be errors in the script that have gone undetected. Please consider this when interpreting information found in this chart.

## 2019-11-13 DIAGNOSIS — Z78.0 MENOPAUSE: ICD-10-CM

## 2019-11-13 RX ORDER — CONJUGATED ESTROGENS 0.62 MG/1
TABLET, FILM COATED ORAL
Qty: 90 TABLET | Refills: 3 | Status: SHIPPED | OUTPATIENT
Start: 2019-11-13 | End: 2020-03-16

## 2019-11-13 NOTE — TELEPHONE ENCOUNTER
"PREMARIN 0.625 MG tablet 90 tablet 0 9/20/2019     Last Written Prescription Date:  9/20/19  Last Fill Quantity: 90,  # refills: 0   Last office visit: 10/9/2019 with prescribing provider:  Pamella   Future Office Visit:  None    Requested Prescriptions   Pending Prescriptions Disp Refills     PREMARIN 0.625 MG tablet [Pharmacy Med Name: PREMARIN 0.625MG TABS] 90 tablet 0     Sig: TAKE ONE TABLET BY MOUTH EVERY DAY       Hormone Replacement Therapy Failed - 11/13/2019 11:13 AM        Failed - Blood pressure under 140/90 in past 12 months     BP Readings from Last 3 Encounters:   10/09/19 (!) 149/87   04/24/19 130/74   04/17/19 140/88                 Passed - Recent (12 mo) or future (30 days) visit within the authorizing provider's specialty     Patient has had an office visit with the authorizing provider or a provider within the authorizing providers department within the previous 12 mos or has a future within next 30 days. See \"Patient Info\" tab in inbasket, or \"Choose Columns\" in Meds & Orders section of the refill encounter.              Passed - Medication is active on med list        Passed - Patient is 18 years of age or older        Passed - No active pregnancy on record        Passed - No positive pregnancy test on record in past 12 months        No flowsheet data found.      "

## 2019-11-13 NOTE — TELEPHONE ENCOUNTER
Routing refill request to provider for review/approval because:  Labs out of range:  BP  JANELLE MunozN, RN  [facility] [facility]

## 2019-11-18 ENCOUNTER — THERAPY VISIT (OUTPATIENT)
Dept: CHIROPRACTIC MEDICINE | Facility: CLINIC | Age: 79
End: 2019-11-18
Payer: MEDICARE

## 2019-11-18 DIAGNOSIS — M54.50 CHRONIC LEFT-SIDED LOW BACK PAIN WITHOUT SCIATICA: ICD-10-CM

## 2019-11-18 DIAGNOSIS — M54.6 CHRONIC LEFT-SIDED THORACIC BACK PAIN: ICD-10-CM

## 2019-11-18 DIAGNOSIS — G89.29 CHRONIC LEFT-SIDED LOW BACK PAIN WITHOUT SCIATICA: ICD-10-CM

## 2019-11-18 DIAGNOSIS — M41.9 SCOLIOSIS, UNSPECIFIED SCOLIOSIS TYPE, UNSPECIFIED SPINAL REGION: ICD-10-CM

## 2019-11-18 DIAGNOSIS — M99.04 SOMATIC DYSFUNCTION OF SACRAL REGION: ICD-10-CM

## 2019-11-18 DIAGNOSIS — M99.02 THORACIC SEGMENT DYSFUNCTION: ICD-10-CM

## 2019-11-18 DIAGNOSIS — M99.03 SOMATIC DYSFUNCTION OF LUMBAR REGION: Primary | ICD-10-CM

## 2019-11-18 DIAGNOSIS — G89.29 CHRONIC LEFT-SIDED THORACIC BACK PAIN: ICD-10-CM

## 2019-11-18 PROCEDURE — 97810 ACUP 1/> WO ESTIM 1ST 15 MIN: CPT | Mod: GA | Performed by: CHIROPRACTOR

## 2019-11-18 PROCEDURE — 98941 CHIROPRACT MANJ 3-4 REGIONS: CPT | Mod: AT | Performed by: CHIROPRACTOR

## 2019-11-18 NOTE — PROGRESS NOTES
Visit #:  2    Subjective:  Ashanti Jarvis is a 79 year old female who is seen in f/u up for:        Somatic dysfunction of lumbar region  Chronic left-sided low back pain without sciatica  Thoracic segment dysfunction  Chronic left-sided thoracic back pain  Scoliosis, unspecified scoliosis type, unspecified spinal region  Somatic dysfunction of sacral region.     Since last visit on 11/11/2019,  Ashanti Jarvis reports:    Area of chief complaint:  Thoracic and Lumbar :  Symptoms are graded at 6/10. The quality is described as stiff, achey, stabbing. The pt reports no change in the mid to low back area. She noted a moderate R upper chest spasm post treatment. She was having difficulty sleeping on the right side. She denies radiation in the extremities.  Motion has remained about the same, no improvement. Patient feels that they are feeling pain in a different location.     Since last visit the patient feels that they are 0-10 percent  improved from last visit.       Objective:  The following was observed:    P: palpatory tenderness Quad lumb and T-spine paraspinal L>>R  A: static palpation demonstrates intersegmental asymmetry   R: motion palpation notes restricted motion  T: hypertonicity at: Lumbar erector spine and Quad lumb L>>R    Segmental spinal dysfunction/restrictions found at:  T6 , T9 , L3 , Sacrum  and PSIS Left       Assessment:    Diagnoses:      1. Somatic dysfunction of lumbar region    2. Chronic left-sided low back pain without sciatica    3. Thoracic segment dysfunction    4. Chronic left-sided thoracic back pain    5. Scoliosis, unspecified scoliosis type, unspecified spinal region    6. Somatic dysfunction of sacral region        Patient's condition:  Patient had restrictions pre-manipulation    Treatment effectiveness:  Post manipulation there is better intersegmental movement and Patient claims to feel looser post manipulation      Procedures:  CMT:  31013 Chiropractic manipulative  treatment 3-4 regions performed   Thoracic: Activator, T6, T9, Prone  Lumbar: Activator, L3, Prone  Pelvis: Drop Table, Sacrum , PSIS Left , Prone    Modalities:  51730: Acupuncture, for 15 minutes:  Points: Ami Points in thoracic and lumbar spine  Ahsi point in hips and legs   For 15 minutes    Therapeutic procedures:  None    Response to Treatment  Reduction in symptoms as reported by patient    Prognosis: Guarded    Progress towards Goals: Patient is making progress towards the goal.   Goals:  STANDING: the patient specific goal is to attain the pre-injury status of 1 hour comfortably   Walking for 30 minutes   Making her bed in the morning      Recommendations:    Instructions:  expect soreness    Follow-up:    Return to care in 1 week with ACP.

## 2019-12-04 ENCOUNTER — HOSPITAL ENCOUNTER (OUTPATIENT)
Dept: MAMMOGRAPHY | Facility: CLINIC | Age: 79
Discharge: HOME | End: 2019-12-04
Attending: INTERNAL MEDICINE | Admitting: INTERNAL MEDICINE
Payer: MEDICARE

## 2019-12-04 DIAGNOSIS — Z12.31 VISIT FOR SCREENING MAMMOGRAM: ICD-10-CM

## 2019-12-04 PROCEDURE — 77063 BREAST TOMOSYNTHESIS BI: CPT

## 2019-12-16 ENCOUNTER — THERAPY VISIT (OUTPATIENT)
Dept: CHIROPRACTIC MEDICINE | Facility: CLINIC | Age: 79
End: 2019-12-16
Payer: MEDICARE

## 2019-12-16 ENCOUNTER — OFFICE VISIT (OUTPATIENT)
Dept: URGENT CARE | Facility: URGENT CARE | Age: 79
End: 2019-12-16
Payer: MEDICARE

## 2019-12-16 ENCOUNTER — ANCILLARY PROCEDURE (OUTPATIENT)
Dept: GENERAL RADIOLOGY | Facility: CLINIC | Age: 79
End: 2019-12-16
Attending: STUDENT IN AN ORGANIZED HEALTH CARE EDUCATION/TRAINING PROGRAM
Payer: MEDICARE

## 2019-12-16 VITALS
OXYGEN SATURATION: 95 % | WEIGHT: 145 LBS | BODY MASS INDEX: 25.69 KG/M2 | HEART RATE: 85 BPM | SYSTOLIC BLOOD PRESSURE: 168 MMHG | TEMPERATURE: 97.6 F | DIASTOLIC BLOOD PRESSURE: 92 MMHG

## 2019-12-16 DIAGNOSIS — M99.03 SOMATIC DYSFUNCTION OF LUMBAR REGION: Primary | ICD-10-CM

## 2019-12-16 DIAGNOSIS — G89.29 CHRONIC LEFT-SIDED LOW BACK PAIN WITHOUT SCIATICA: ICD-10-CM

## 2019-12-16 DIAGNOSIS — I10 ESSENTIAL HYPERTENSION, BENIGN: Chronic | ICD-10-CM

## 2019-12-16 DIAGNOSIS — M54.6 CHRONIC LEFT-SIDED THORACIC BACK PAIN: ICD-10-CM

## 2019-12-16 DIAGNOSIS — M99.04 SOMATIC DYSFUNCTION OF SACRAL REGION: ICD-10-CM

## 2019-12-16 DIAGNOSIS — G89.29 CHRONIC LEFT-SIDED THORACIC BACK PAIN: ICD-10-CM

## 2019-12-16 DIAGNOSIS — M99.02 THORACIC SEGMENT DYSFUNCTION: ICD-10-CM

## 2019-12-16 DIAGNOSIS — R05.9 COUGH: ICD-10-CM

## 2019-12-16 DIAGNOSIS — R05.8 POST-VIRAL COUGH SYNDROME: Primary | ICD-10-CM

## 2019-12-16 DIAGNOSIS — M54.50 CHRONIC LEFT-SIDED LOW BACK PAIN WITHOUT SCIATICA: ICD-10-CM

## 2019-12-16 PROCEDURE — 98941 CHIROPRACT MANJ 3-4 REGIONS: CPT | Mod: AT | Performed by: CHIROPRACTOR

## 2019-12-16 PROCEDURE — 71046 X-RAY EXAM CHEST 2 VIEWS: CPT

## 2019-12-16 PROCEDURE — 97810 ACUP 1/> WO ESTIM 1ST 15 MIN: CPT | Mod: GA | Performed by: CHIROPRACTOR

## 2019-12-16 PROCEDURE — 99203 OFFICE O/P NEW LOW 30 MIN: CPT | Performed by: STUDENT IN AN ORGANIZED HEALTH CARE EDUCATION/TRAINING PROGRAM

## 2019-12-16 RX ORDER — CODEINE PHOSPHATE AND GUAIFENESIN 10; 100 MG/5ML; MG/5ML
1-2 SOLUTION ORAL EVERY 4 HOURS PRN
Qty: 180 ML | Refills: 0 | Status: SHIPPED | OUTPATIENT
Start: 2019-12-16 | End: 2019-12-23

## 2019-12-16 NOTE — PROGRESS NOTES
Visit #:  3    Subjective:  Ashanti Jarvis is a 79 year old female who is seen in f/u up for:        Somatic dysfunction of lumbar region  Chronic left-sided low back pain without sciatica  Thoracic segment dysfunction  Chronic left-sided thoracic back pain  Somatic dysfunction of sacral region.     Since last visit,  Ashanti Jarvis reports:    Area of chief complaint:  Thoracic and Lumbar :  Symptoms are graded at 6/10. The quality is described as stiff, achey, stabbing. The pt reports no change in the mid to low back area. She has been sick and laying on her back for almost one week. When she makes her bed she will feel moderate pain on the L side of the low back and hip. The pt is able to stand and sit for prolonged periods with minimal pain.     Since last visit the patient feels that they are 0-10 percent  improved from last visit.       Objective:  The following was observed:    P: palpatory tenderness Quad lumb and T-spine paraspinal L>>R  A: static palpation demonstrates intersegmental asymmetry   R: motion palpation notes restricted motion  T: hypertonicity at: Lumbar erector spine and Quad lumb L>>R    Segmental spinal dysfunction/restrictions found at:  T6 , T9 , L3 , Sacrum  and PSIS Left       Assessment:    Diagnoses:      1. Somatic dysfunction of lumbar region    2. Chronic left-sided low back pain without sciatica    3. Thoracic segment dysfunction    4. Chronic left-sided thoracic back pain    5. Somatic dysfunction of sacral region        Patient's condition:  Patient had restrictions pre-manipulation    Treatment effectiveness:  Post manipulation there is better intersegmental movement and Patient claims to feel looser post manipulation      Procedures:  CMT:  85857 Chiropractic manipulative treatment 3-4 regions performed   Thoracic: Activator, T6, T9, Prone  Lumbar: Activator, L3, Prone  Pelvis: Drop Table, Sacrum , PSIS Left , Prone    Modalities:  88882: Acupuncture, for 15 minutes:   Points: [place] Points in thoracic and lumbar spine  Ahsi point in hips and legs   For 15 minutes    Therapeutic procedures:  None    Response to Treatment  Reduction in symptoms as reported by patient    Prognosis: Guarded    Progress towards Goals: Patient is making progress towards the goal.   Goals:  STANDING: the patient specific goal is to attain the pre-injury status of 1 hour comfortably   Walking for 30 minutes   Making her bed in the morning      Recommendations:    Instructions:  expect soreness    Follow-up:    Return to care in 1 week with ACP.

## 2019-12-17 NOTE — PROGRESS NOTES
Assessment and Plan   1. Post-viral cough syndrome  No concerning symptoms or exam, without fever unlikely PNA. However, patient wanted to pursue CXR to be sure. Personally reviewed with the patient and spoke with radiology who confirmed no abnormalities. Recommended the below,  appropriate.  - guaiFENesin-codeine (ROBITUSSIN AC) 100-10 MG/5ML solution; Take 5-10 mLs by mouth every 4 hours as needed for cough  Dispense: 180 mL; Refill: 0    2. Essential hypertension, benign  Elevated x2, recommended PCP f/u.    Follow up if not improving.    Options for treatment and follow-up care were reviewed with the patient and/or guardian. Ashanti Jarvis and/or guardian engaged in the decision making process and verbalized understanding of the options discussed and agreed with the final plan.    Zackary Schaefer MD         HPI:   Ashanti Jarvis is a 79 year old female who presents to clinic today for   Chief Complaint   Patient presents with     Urgent Care     Cough      had cold x1week now deep cough.      Patient's first symptom was a sore throat 1 week ago which then changed into a harsh cough. No fevers or chills. No rash, SOB, nausea, vomiting, diarrhea.    Sore throat has resolved, but the cough has persisted and has been productive.         Review of Systems:     Constitutional, HEENT, cardiovascular, pulmonary, GI, musculoskeletal, neuro, skin, and psych systems are negative, except as otherwise noted.          PMHX:   Active Problems List  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 12-12-12     Chronic maxillary sinusitis S/P drainage of maxillary, frontal sinuses 12-11-12      Osteoporosis     Urge urinary incontinence     Lichen planus     Chronic left-sided low back pain without sciatica     Hyperlipidemia, unspecified hyperlipidemia type     Adnexal cyst     Urge incontinence     Urinary incontinence, unspecified type     Thoracic segment dysfunction     Chronic left-sided thoracic back pain     Somatic dysfunction of lumbar region     Somatic dysfunction of sacral region     Scoliosis, unspecified scoliosis type, unspecified spinal region     Active problem list reviewed.    Current Medications  Current Outpatient Medications   Medication Sig Dispense Refill     guaiFENesin-codeine (ROBITUSSIN AC) 100-10 MG/5ML solution Take 5-10 mLs by mouth every 4 hours as needed for cough 180 mL 0     atorvastatin (LIPITOR) 20 MG tablet Take 1 tablet (20 mg) by mouth daily 90 tablet 0     Calcium-Magnesium-Zinc 167-83-8 MG TABS Take 1 tablet by mouth daily       cevimeline (EVOXAC) 30 MG capsule Take 30 mg by mouth 3 times daily        dexamethasone 0.5 MG/5ML ELIX Swish and spit in mouth daily as needed (2 weeks at a time)        leflunomide (ARAVA) 20 MG tablet Take 1 tablet by mouth daily       metoprolol succinate ER (TOPROL-XL) 50 MG 24 hr tablet TAKE ONE TABLET BY MOUTH EVERY DAY 90 tablet 0     multivitamin, therapeutic with minerals (MULTI-VITAMIN) TABS Take 1 tablet by mouth daily 100 tablet 3     PREMARIN 0.625 MG tablet TAKE ONE TABLET BY MOUTH EVERY DAY 90 tablet 3     Probiotic Product (PROBIOTIC FORMULA PO) Take  by mouth daily.       UNABLE TO FIND MEDICATION NAME: Rhod mouthwash swish and spit daily as needed       Vitamin D, Cholecalciferol, 1000 UNITS CAPS Take 1 capsule by mouth daily       Medication list reviewed.    Social History  Social History     Tobacco Use     Smoking status: Former Smoker     Packs/day: 0.10     Years: 1.00     Pack years: 0.10     Types: Cigarettes     Last attempt to quit: 1962     Years since quittin.9     Smokeless tobacco: Never  "Used     Tobacco comment: tried in college   Substance Use Topics     Alcohol use: Yes     Alcohol/week: 3.0 standard drinks     Types: 3 Standard drinks or equivalent per week     Comment:  abt 2 glasses wine per week      Drug use: No     History   Drug Use No     Family History  Family History   Problem Relation Age of Onset     Osteoporosis Mother      Cancer Father         Lung CA      Allergies  Allergies   Allergen Reactions     Vancomycin Rash     \"like lizard skin all over my body\"          Physical Exam:     Vitals:    12/16/19 1725   BP: (!) 168/92   Pulse: 85   Temp: 97.6  F (36.4  C)   TempSrc: Oral   SpO2: 95%   Weight: 65.8 kg (145 lb)     Body mass index is 25.69 kg/m .    GENERAL APPEARANCE: alert, appears stated age, no acute distress  HEENT: Eyes grossly normal to inspection, nares normal, and mouth and throat without erythema, ulcers, or lesions, bilateral ear canals and TMs normal  RESP: lungs clear to auscultation - no rales, rhonchi, or wheezes  CV: regular rate and rhythm, no murmur, click, rub, or gallop  ABDOMEN: soft, nontender   MSK: extremities normal, no gross deformities noted, no lower extremity edema  NEURO: sensory exam grossly normal, mentation appears intact and speech normal  PSYCH: mood and affect normal/bright      "

## 2020-01-06 ENCOUNTER — THERAPY VISIT (OUTPATIENT)
Dept: CHIROPRACTIC MEDICINE | Facility: CLINIC | Age: 80
End: 2020-01-06
Payer: MEDICARE

## 2020-01-06 DIAGNOSIS — G89.29 CHRONIC LEFT-SIDED THORACIC BACK PAIN: ICD-10-CM

## 2020-01-06 DIAGNOSIS — G89.29 CHRONIC LEFT-SIDED LOW BACK PAIN WITHOUT SCIATICA: ICD-10-CM

## 2020-01-06 DIAGNOSIS — M99.03 SOMATIC DYSFUNCTION OF LUMBAR REGION: Primary | ICD-10-CM

## 2020-01-06 DIAGNOSIS — M54.6 CHRONIC LEFT-SIDED THORACIC BACK PAIN: ICD-10-CM

## 2020-01-06 DIAGNOSIS — M54.50 CHRONIC LEFT-SIDED LOW BACK PAIN WITHOUT SCIATICA: ICD-10-CM

## 2020-01-06 DIAGNOSIS — M99.04 SOMATIC DYSFUNCTION OF SACRAL REGION: ICD-10-CM

## 2020-01-06 DIAGNOSIS — M99.02 THORACIC SEGMENT DYSFUNCTION: ICD-10-CM

## 2020-01-06 DIAGNOSIS — M41.9 SCOLIOSIS, UNSPECIFIED SCOLIOSIS TYPE, UNSPECIFIED SPINAL REGION: ICD-10-CM

## 2020-01-06 PROCEDURE — 98941 CHIROPRACT MANJ 3-4 REGIONS: CPT | Mod: AT | Performed by: CHIROPRACTOR

## 2020-01-06 PROCEDURE — 97810 ACUP 1/> WO ESTIM 1ST 15 MIN: CPT | Mod: GA | Performed by: CHIROPRACTOR

## 2020-01-06 NOTE — PROGRESS NOTES
Visit #:  4    Subjective:  Ashanti Jarvis is a 79 year old female who is seen in f/u up for:        Somatic dysfunction of lumbar region  Chronic left-sided low back pain without sciatica  Somatic dysfunction of sacral region  Chronic left-sided thoracic back pain  Thoracic segment dysfunction  Scoliosis, unspecified scoliosis type, unspecified spinal region.     Since last visit,  Ashanti Jarvis reports:    Area of chief complaint:  Thoracic and Lumbar :  Symptoms are graded at 5/10. The quality is described as stiff, achey, stabbing. The pt reports improvement in the L low back area since initial presentation. She is pleased with her progress. She reports tension across the low back area and in the sacral area. Standing seems to increase the pain. The pt denies weakness or other unusual sx.      Since last visit the patient feels that they are 20 percent  improved from last visit.       Objective:  The following was observed:    P: palpatory tenderness Quad lumb and T-spine paraspinal L>>R  A: static palpation demonstrates intersegmental asymmetry   R: motion palpation notes restricted motion  T: hypertonicity at: Lumbar erector spine and Quad lumb L>>R    Segmental spinal dysfunction/restrictions found at:  T6 , T9 , L3 , Sacrum  and PSIS Left       Assessment:    Diagnoses:      1. Somatic dysfunction of lumbar region    2. Chronic left-sided low back pain without sciatica    3. Somatic dysfunction of sacral region    4. Chronic left-sided thoracic back pain    5. Thoracic segment dysfunction    6. Scoliosis, unspecified scoliosis type, unspecified spinal region        Patient's condition:  Patient responding well to therapy    Treatment effectiveness:  Post manipulation there is better intersegmental movement and Patient claims to feel looser post manipulation      Procedures:  CMT:  86917 Chiropractic manipulative treatment 3-4 regions performed   Thoracic: Activator, T6, T9, Prone  Lumbar:  Activator, L3, Prone  Pelvis: Drop Table, Sacrum , PSIS Left , Prone    Modalities:  63236: Acupuncture, for 15 minutes:  Points: Ami Points in thoracic and lumbar spine  Ahsi point in hips and legs   For 15 minutes    Therapeutic procedures:  None    Response to Treatment  Reduction in symptoms as reported by patient    Prognosis: Guarded    Progress towards Goals: Patient is making progress towards the goal.   Goals:  STANDING: the patient specific goal is to attain the pre-injury status of 1 hour comfortably   Walking for 30 minutes   Making her bed in the morning      Recommendations:    Instructions:  expect soreness    Follow-up:    Return to care in 1 week with ACP.

## 2020-01-13 DIAGNOSIS — I10 ESSENTIAL HYPERTENSION, BENIGN: Chronic | ICD-10-CM

## 2020-01-13 DIAGNOSIS — E78.5 HYPERLIPIDEMIA, UNSPECIFIED HYPERLIPIDEMIA TYPE: ICD-10-CM

## 2020-01-13 RX ORDER — ATORVASTATIN CALCIUM 20 MG/1
TABLET, FILM COATED ORAL
Qty: 90 TABLET | Refills: 2 | Status: SHIPPED | OUTPATIENT
Start: 2020-01-13 | End: 2020-03-16

## 2020-01-13 RX ORDER — METOPROLOL SUCCINATE 50 MG/1
TABLET, EXTENDED RELEASE ORAL
Qty: 90 TABLET | Refills: 2 | Status: SHIPPED | OUTPATIENT
Start: 2020-01-13 | End: 2020-03-16

## 2020-01-13 NOTE — TELEPHONE ENCOUNTER
Routing refill request to provider for review/approval because:  Labs out of range:  BP    Filled per [facility] protocol-atorvastatin    JANELLE JungN, RN  [employer] [facility]

## 2020-01-13 NOTE — TELEPHONE ENCOUNTER
"atorvastatin (LIPITOR) 20 MG tablet 90 tablet 0 9/20/2019  No   Sig - Route: Take 1 tablet (20 mg) by mouth daily        metoprolol succinate ER (TOPROL-XL) 50 MG 24 hr tablet 90 tablet 0 9/27/2019  No   Sig: TAKE ONE TABLET BY MOUTH EVERY DAY     BOTH Last Written Prescription Date:  09/27/2019  Last Fill Quantity: 90,  # refills: 0   Last office visit: 10/9/2019 with prescribing provider:     Future Office Visit:      Requested Prescriptions   Pending Prescriptions Disp Refills     metoprolol succinate ER (TOPROL-XL) 50 MG 24 hr tablet [Pharmacy Med Name: METOPROLOL SUCCINATE ER 50MG TB24] 90 tablet 0     Sig: TAKE ONE TABLET BY MOUTH EVERY DAY       Beta-Blockers Protocol Failed - 1/13/2020 10:33 AM        Failed - Blood pressure under 140/90 in past 12 months     BP Readings from Last 3 Encounters:   12/16/19 (!) 168/92   10/09/19 (!) 149/87   04/24/19 130/74                 Passed - Patient is age 6 or older        Passed - Recent (12 mo) or future (30 days) visit within the authorizing provider's specialty     Patient has had an office visit with the authorizing provider or a provider within the authorizing providers department within the previous 12 mos or has a future within next 30 days. See \"Patient Info\" tab in inbasket, or \"Choose Columns\" in Meds & Orders section of the refill encounter.              Passed - Medication is active on med list        atorvastatin (LIPITOR) 20 MG tablet [Pharmacy Med Name: ATORVASTATIN CALCIUM 20MG TABS] 90 tablet 0     Sig: TAKE ONE TABLET BY MOUTH EVERY DAY       Statins Protocol Passed - 1/13/2020 10:33 AM        Passed - LDL on file in past 12 months     Recent Labs   Lab Test 09/04/19  0902   LDL 90             Passed - No abnormal creatine kinase in past 12 months     No lab results found.             Passed - Recent (12 mo) or future (30 days) visit within the authorizing provider's specialty     Patient has had an office visit with the authorizing provider or a " "provider within the authorizing providers department within the previous 12 mos or has a future within next 30 days. See \"Patient Info\" tab in inbasket, or \"Choose Columns\" in Meds & Orders section of the refill encounter.              Passed - Medication is active on med list        Passed - Patient is age 18 or older        Passed - No active pregnancy on record        Passed - No positive pregnancy test in past 12 months            "

## 2020-02-26 DIAGNOSIS — I10 ESSENTIAL HYPERTENSION, BENIGN: Chronic | ICD-10-CM

## 2020-02-26 RX ORDER — METOPROLOL SUCCINATE 50 MG/1
TABLET, EXTENDED RELEASE ORAL
Start: 2020-02-26

## 2020-02-26 NOTE — TELEPHONE ENCOUNTER
"Denied  Too early; Rx sent 1/13/2020 for 9 months  Elo RUVALCABA RN    Last Written Prescription Date:  1/13/2020  Last Fill Quantity: 90,  # refills: 2   Last office visit: 10/9/2019 with prescribing provider:     Future Office Visit:    Requested Prescriptions   Pending Prescriptions Disp Refills     metoprolol succinate ER (TOPROL-XL) 50 MG 24 hr tablet [Pharmacy Med Name: METOPROLOL SUCCINATE ER 50MG TB24] 90 tablet 0     Sig: TAKE ONE TABLET BY MOUTH EVERY DAY       Beta-Blockers Protocol Failed - 2/26/2020  6:55 AM        Failed - Blood pressure under 140/90 in past 12 months     BP Readings from Last 3 Encounters:   12/16/19 (!) 168/92   10/09/19 (!) 149/87   04/24/19 130/74                 Passed - Patient is age 6 or older        Passed - Recent (12 mo) or future (30 days) visit within the authorizing provider's specialty     Patient has had an office visit with the authorizing provider or a provider within the authorizing providers department within the previous 12 mos or has a future within next 30 days. See \"Patient Info\" tab in inbasket, or \"Choose Columns\" in Meds & Orders section of the refill encounter.              Passed - Medication is active on med list        "

## 2020-03-16 DIAGNOSIS — Z78.0 MENOPAUSE: ICD-10-CM

## 2020-03-16 DIAGNOSIS — E78.5 HYPERLIPIDEMIA, UNSPECIFIED HYPERLIPIDEMIA TYPE: ICD-10-CM

## 2020-03-16 DIAGNOSIS — I10 ESSENTIAL HYPERTENSION, BENIGN: Chronic | ICD-10-CM

## 2020-03-16 NOTE — TELEPHONE ENCOUNTER
"Switching  pharm from [facility] to [facility]       atorvastatin (LIPITOR) 20 MG tablet  90 tablet  2  1/13/2020   No    Sig: TAKE ONE TABLET BY MOUTH EVERY DAY       metoprolol succinate ER (TOPROL-XL) 50 MG 24 hr tablet  90 tablet  2  1/13/2020   No    Sig: TAKE ONE TABLET BY MOUTH EVERY DAY     Both Last Written Prescription Date:  01/13/2020  Last Fill Quantity: 90,  # refills: 2   Last office visit: 10/9/2019 with prescribing provider:     Future Office Visit:        PREMARIN 0.625 MG tablet  90 tablet  3  11/13/2019   No    Sig: TAKE ONE TABLET BY MOUTH EVERY DAY     Last Written Prescription Date:  11/13/2019  Last Fill Quantity: 90,  # refills: 3   Last office visit: 10/9/2019 with prescribing provider:     Future Office Visit:      Requested Prescriptions   Pending Prescriptions Disp Refills     metoprolol succinate ER (TOPROL-XL) 50 MG 24 hr tablet 90 tablet 2     Sig: Take 1 tablet (50 mg) by mouth daily       Beta-Blockers Protocol Failed - 3/16/2020  8:25 AM        Failed - Blood pressure under 140/90 in past 12 months     BP Readings from Last 3 Encounters:   12/16/19 (!) 168/92   10/09/19 (!) 149/87   04/24/19 130/74                 Passed - Patient is age 6 or older        Passed - Recent (12 mo) or future (30 days) visit within the authorizing provider's specialty     Patient has had an office visit with the authorizing provider or a provider within the authorizing providers department within the previous 12 mos or has a future within next 30 days. See \"Patient Info\" tab in inbasket, or \"Choose Columns\" in Meds & Orders section of the refill encounter.              Passed - Medication is active on med list           estrogen conj (PREMARIN) 0.625 MG tablet 90 tablet 3     Sig: Take 1 tablet (0.625 mg) by mouth daily       Hormone Replacement Therapy Failed - 3/16/2020  8:25 AM        Failed - Blood pressure under 140/90 in past 12 months     BP Readings from Last 3 Encounters:   12/16/19 " "(!) 168/92   10/09/19 (!) 149/87   04/24/19 130/74                 Passed - Recent (12 mo) or future (30 days) visit within the authorizing provider's specialty     Patient has had an office visit with the authorizing provider or a provider within the authorizing providers department within the previous 12 mos or has a future within next 30 days. See \"Patient Info\" tab in inbasket, or \"Choose Columns\" in Meds & Orders section of the refill encounter.              Passed - Medication is active on med list        Passed - Patient is 18 years of age or older        Passed - No active pregnancy on record        Passed - No positive pregnancy test on record in past 12 months           atorvastatin (LIPITOR) 20 MG tablet 90 tablet 2     Sig: Take 1 tablet (20 mg) by mouth daily       Statins Protocol Passed - 3/16/2020  8:25 AM        Passed - LDL on file in past 12 months     Recent Labs   Lab Test 09/04/19  0902   LDL 90             Passed - No abnormal creatine kinase in past 12 months     No lab results found.             Passed - Recent (12 mo) or future (30 days) visit within the authorizing provider's specialty     Patient has had an office visit with the authorizing provider or a provider within the authorizing providers department within the previous 12 mos or has a future within next 30 days. See \"Patient Info\" tab in inbasket, or \"Choose Columns\" in Meds & Orders section of the refill encounter.              Passed - Medication is active on med list        Passed - Patient is age 18 or older        Passed - No active pregnancy on record        Passed - No positive pregnancy test in past 12 months             "

## 2020-03-18 RX ORDER — METOPROLOL SUCCINATE 50 MG/1
50 TABLET, EXTENDED RELEASE ORAL DAILY
Qty: 90 TABLET | Refills: 2 | Status: SHIPPED | OUTPATIENT
Start: 2020-03-18 | End: 2020-09-22

## 2020-03-18 RX ORDER — ATORVASTATIN CALCIUM 20 MG/1
20 TABLET, FILM COATED ORAL DAILY
Qty: 90 TABLET | Refills: 2 | Status: SHIPPED | OUTPATIENT
Start: 2020-03-18 | End: 2020-09-30

## 2020-04-21 ENCOUNTER — TRANSFERRED RECORDS (OUTPATIENT)
Dept: HEALTH INFORMATION MANAGEMENT | Facility: CLINIC | Age: 80
End: 2020-04-21

## 2020-04-21 LAB
ALT SERPL-CCNC: 19 IU/L (ref 5–35)
AST SERPL-CCNC: 24 U/L (ref 5–34)

## 2020-04-28 ENCOUNTER — TRANSFERRED RECORDS (OUTPATIENT)
Dept: HEALTH INFORMATION MANAGEMENT | Facility: CLINIC | Age: 80
End: 2020-04-28

## 2020-09-19 DIAGNOSIS — I10 ESSENTIAL HYPERTENSION, BENIGN: Chronic | ICD-10-CM

## 2020-09-19 NOTE — TELEPHONE ENCOUNTER
Refill request:    METOPROLOL 50 MG ER    Summary: Take 1 tablet (50 mg) by mouth daily, Disp-90 tablet,R-2, E-Prescribe   Dose, Route, Frequency: 50 mg, Oral, DAILY  Start: 3/18/2020  Ord/Sold: 3/18/2020

## 2020-09-21 RX ORDER — METOPROLOL SUCCINATE 50 MG/1
50 TABLET, EXTENDED RELEASE ORAL DAILY
Start: 2020-09-21

## 2020-09-30 ENCOUNTER — VIRTUAL VISIT (OUTPATIENT)
Dept: FAMILY MEDICINE | Facility: CLINIC | Age: 80
End: 2020-09-30
Payer: MEDICARE

## 2020-09-30 DIAGNOSIS — Z76.0 ENCOUNTER FOR MEDICATION REFILL: Primary | ICD-10-CM

## 2020-09-30 DIAGNOSIS — E78.5 HYPERLIPIDEMIA, UNSPECIFIED HYPERLIPIDEMIA TYPE: ICD-10-CM

## 2020-09-30 DIAGNOSIS — I10 ESSENTIAL HYPERTENSION, BENIGN: Chronic | ICD-10-CM

## 2020-09-30 DIAGNOSIS — Z78.0 MENOPAUSE: ICD-10-CM

## 2020-09-30 PROCEDURE — 99443 ZZC PHYSICIAN TELEPHONE EVALUATION 21-30 MIN: CPT | Mod: 95 | Performed by: INTERNAL MEDICINE

## 2020-09-30 RX ORDER — METOPROLOL SUCCINATE 50 MG/1
50 TABLET, EXTENDED RELEASE ORAL DAILY
Qty: 90 TABLET | Refills: 3 | Status: SHIPPED | OUTPATIENT
Start: 2020-09-30 | End: 2021-08-27

## 2020-09-30 RX ORDER — ATORVASTATIN CALCIUM 20 MG/1
20 TABLET, FILM COATED ORAL DAILY
Qty: 90 TABLET | Refills: 3 | Status: SHIPPED | OUTPATIENT
Start: 2020-09-30 | End: 2020-09-30

## 2020-09-30 RX ORDER — ATORVASTATIN CALCIUM 20 MG/1
20 TABLET, FILM COATED ORAL DAILY
Qty: 90 TABLET | Refills: 3 | Status: SHIPPED | OUTPATIENT
Start: 2020-09-30 | End: 2021-09-22

## 2020-09-30 RX ORDER — METOPROLOL SUCCINATE 50 MG/1
50 TABLET, EXTENDED RELEASE ORAL DAILY
Qty: 90 TABLET | Refills: 3 | Status: SHIPPED | OUTPATIENT
Start: 2020-09-30 | End: 2020-09-30

## 2020-09-30 NOTE — PROGRESS NOTES
"Ashanti Jarvis is a 79 year old female who is being evaluated via a billable telephone visit.      The patient has been notified of following:     \"This telephone visit will be conducted via a call between you and your physician/provider. We have found that certain health care needs can be provided without the need for a physical exam.  This service lets us provide the care you need with a short phone conversation.  If a prescription is necessary we can send it directly to your pharmacy.  If lab work is needed we can place an order for that and you can then stop by our lab to have the test done at a later time.    Telephone visits are billed at different rates depending on your insurance coverage. During this emergency period, for some insurers they may be billed the same as an in-person visit.  Please reach out to your insurance provider with any questions.    If during the course of the call the physician/provider feels a telephone visit is not appropriate, you will not be charged for this service.\"    Patient has given verbal consent for Telephone visit?  Yes    What phone number would you like to be contacted at? [phone]    How would you like to obtain your AVS? Ambrocio Diaz     Ashanti Jarvis is a 79 year old female who presents via phone visit today for the following health issues:    HPI      Chief Complaint:     Recheck Medications    HPI:   Patient Ashanti Jarvis is a very pleasant 79 year old female with history of hypertension, hyperlipidemia who presents to Internal Medicine clinic today for follow up of multiple concerns including medication refills. Regarding the patient's hypertension, the patient reports that her hypertension is currently well controlled. Patient is due for a refill of her metoprolol BP medication at this time. Regarding the patient's hyperlipidemia, the patient is due for a refill of her Lipitor cholesterol medication. No chest pain, headaches, fever or " "chills at this time.           Current Medications:     Current Outpatient Medications   Medication Sig Dispense Refill     atorvastatin (LIPITOR) 20 MG tablet Take 1 tablet (20 mg) by mouth daily 90 tablet 3     cevimeline (EVOXAC) 30 MG capsule Take 30 mg by mouth 3 times daily        dexamethasone 0.5 MG/5ML ELIX Swish and spit in mouth daily as needed (2 weeks at a time)        estrogen conj (PREMARIN) 0.625 MG tablet Take 1 tablet (0.625 mg) by mouth daily 90 tablet 3     leflunomide (ARAVA) 20 MG tablet Take 1 tablet by mouth daily       metoprolol succinate ER (TOPROL-XL) 50 MG 24 hr tablet Take 1 tablet (50 mg) by mouth daily 90 tablet 3     multivitamin, therapeutic with minerals (MULTI-VITAMIN) TABS Take 1 tablet by mouth daily 100 tablet 3     UNABLE TO FIND MEDICATION NAME: Rhodus mouthwash swish and spit daily as needed       Vitamin D, Cholecalciferol, 1000 UNITS CAPS Take 1 capsule by mouth daily       Probiotic Product (PROBIOTIC FORMULA PO) Take  by mouth daily.           Allergies:      Allergies   Allergen Reactions     Vancomycin Rash     \"like lizard skin all over my body\"            Past Medical History:     Past Medical History:   Diagnosis Date     Benign neoplasm of colon 5/01    tubular adenoma; one non-adenomatous polyp 2012 - rec f/u 5 years     Essential hypertension, benign      Essential tremor      Hx estrogen therapy     Since hysterectomy (estimates ~30 years as of Sept 2014)     Intracranial abscess Dec 2012    H/o sinusitis and intracranial abscess     Lichen planus      Migraine, unspecified, without mention of intractable migraine without mention of status migrainosus 1963     Osteoporosis     Reclast 10/11 --> drug holiday 2015 per rheum     Other isolated or specific phobias     Claustrophobia     Other kyphoscoliosis and scoliosis      Pemphigoid, benign mucous membrane      Personal history of urinary calculi      Rheumatoid arthritis(714.0) 1991    Followed by Dr Ross " Wilber at [facility]     Sjogren's syndrome (H)     due to meds     Unspecified tinnitus     Left ear     Urge urinary incontinence          Past Surgical History:     Past Surgical History:   Procedure Laterality Date     APPENDECTOMY       C NONSPECIFIC PROCEDURE  1987    pneumonia     COLONOSCOPY N/A 5/25/2017    Procedure: COLONOSCOPY;  colonoscopy;  Surgeon: Alan Crenshaw MD;  Location:  GI     ENDOSCOPIC BALLOON SINUPLASTY ACCLARENT  12/11/2012    Procedure: ENDOSCOPIC BALLOON SINUPLASTY ACCLARENT;  JOSHUA. MAXILLO SINUS SURGERY,  ;  Surgeon: David Ortega MD;  Location:  OR     HYSTERECTOMY, VAGINAL  1983    has ovaries; secondary to fibroids     KERATOTOMY ARCUATE WITH FEMTOSECOND LASER/IMAGING FOR ATIOL Right 1/4/2016    Procedure: KERATOTOMY ARCUATE WITH FEMTOSECOND LASER/IMAGING FOR ATIOL;  Surgeon: Larry Vilchis MD;  Location: [facility]     OPTICAL TRACKING SYSTEM CRANIOTOMY, EXCISE TUMOR, COMBINED  12/12/2012    Procedure: COMBINED OPTICAL TRACKING SYSTEM CRANIOTOMY, EXCISE TUMOR;  LEFT FRONTAL CRANIOTOMY, IMAGE GUIDANCE FROM FERMIN PALENCIA. - SUPINE, CLAUS MABRY;  Surgeon: Tomi Fernandez MD;  Location:  OR     PHACOEMULSIFICATION CLEAR CORNEA WITH STANDARD INTRAOCULAR LENS IMPLANT Left 12/21/2015    Procedure: PHACOEMULSIFICATION CLEAR CORNEA WITH STANDARD INTRAOCULAR LENS IMPLANT;  Surgeon: Larry Vilchis MD;  Location:  EC     PHACOEMULSIFICATION CLEAR CORNEA WITH STANDARD INTRAOCULAR LENS IMPLANT Right 1/4/2016    Procedure: PHACOEMULSIFICATION CLEAR CORNEA WITH STANDARD INTRAOCULAR LENS IMPLANT;  Surgeon: Larry Vilchis MD;  Location:  EC         Family Medical History:     Family History   Problem Relation Age of Onset     Osteoporosis Mother      Cancer Father         Lung CA          Social History:     Social History     Socioeconomic History     Marital status:      Spouse name: Not on file     Number of children: 2      Years of education: Not on file     Highest education level: Not on file   Occupational History     Not on file   Social Needs     Financial resource strain: Not on file     Food insecurity     Worry: Not on file     Inability: Not on file     Transportation needs     Medical: Not on file     Non-medical: Not on file   Tobacco Use     Smoking status: Former Smoker     Packs/day: 0.10     Years: 1.00     Pack years: 0.10     Types: Cigarettes     Quit date: 1962     Years since quittin.7     Smokeless tobacco: Never Used     Tobacco comment: tried in college   Substance and Sexual Activity     Alcohol use: Yes     Alcohol/week: 3.0 standard drinks     Types: 3 Standard drinks or equivalent per week     Comment:  abt 2 glasses wine per week      Drug use: No     Sexual activity: Not Currently     Partners: Male   Lifestyle     Physical activity     Days per week: Not on file     Minutes per session: Not on file     Stress: Not on file   Relationships     Social connections     Talks on phone: Not on file     Gets together: Not on file     Attends Evangelical service: Not on file     Active member of club or organization: Not on file     Attends meetings of clubs or organizations: Not on file     Relationship status: Not on file     Intimate partner violence     Fear of current or ex partner: Not on file     Emotionally abused: Not on file     Physically abused: Not on file     Forced sexual activity: Not on file   Other Topics Concern     Parent/sibling w/ CABG, MI or angioplasty before 65F 55M? Not Asked   Social History Narrative     Not on file           Review of System:     Constitutional: Negative for fever or chills  Skin: Negative for rashes  Ears/Nose/Throat: Negative for nasal congestion, sore throat  Respiratory: No shortness of breath, dyspnea on exertion, cough, or hemoptysis  Cardiovascular: Negative for chest pain  Gastrointestinal: Negative for nausea, vomiting  Genitourinary: Negative for  dysuria, hematuria  Musculoskeletal: Negative for myalgias  Neurologic: Negative for headaches  Psychiatric: Negative for depression, anxiety  Hematologic/Lymphatic/Immunologic: Negative  Endocrine: Negative  Behavioral: Negative for tobacco use       Physical Exam:   There were no vitals taken for this visit.    RESP: no cough over the phone  NEURO: Alert & Oriented x 3 over the phone  PSYCH: mentation appears normal over the phone        Diagnostic Test Results:     Diagnostic Test Results:  Labs reviewed in Epic    ASSESSMENT/PLAN:       (Z76.0) Encounter for medication refill  (primary encounter diagnosis)  (I10) Essential hypertension, benign  Comment: stable. Patient is due for a refill of BP medications.  Plan: metoprolol succinate ER (TOPROL-XL) 50 MG 24 hr        tablet          (E78.5) Hyperlipidemia, unspecified hyperlipidemia type  Comment: stable. Patient is due for a refill of Lipitor.  Plan: atorvastatin (LIPITOR) 20 MG tablet          (Z78.0) Menopause  Comment: stable. Patient is due for a refill of Premarin medication.  Plan: estrogen conj (PREMARIN) 0.625 MG tablet            Follow Up Plan:     Patient is instructed to return to Internal Medicine clinic for follow-up visit in 1 month.    Phone call duration:  25 minutes      Chantale Can MD  Internal Medicine  [facility]

## 2020-10-29 ENCOUNTER — TRANSFERRED RECORDS (OUTPATIENT)
Dept: HEALTH INFORMATION MANAGEMENT | Facility: CLINIC | Age: 80
End: 2020-10-29

## 2021-01-15 ENCOUNTER — HEALTH MAINTENANCE LETTER (OUTPATIENT)
Age: 81
End: 2021-01-15

## 2021-01-30 ENCOUNTER — IMMUNIZATION (OUTPATIENT)
Dept: NURSING | Facility: CLINIC | Age: 81
End: 2021-01-30
Payer: COMMERCIAL

## 2021-01-30 PROCEDURE — 0001A PR COVID VAC PFIZER DIL RECON 30 MCG/0.3 ML IM: CPT

## 2021-01-30 PROCEDURE — 91300 PR COVID VAC PFIZER DIL RECON 30 MCG/0.3 ML IM: CPT

## 2021-02-20 ENCOUNTER — IMMUNIZATION (OUTPATIENT)
Dept: NURSING | Facility: CLINIC | Age: 81
End: 2021-02-20
Payer: COMMERCIAL

## 2021-02-20 PROCEDURE — 91300 PR COVID VAC PFIZER DIL RECON 30 MCG/0.3 ML IM: CPT

## 2021-02-20 PROCEDURE — 0002A PR COVID VAC PFIZER DIL RECON 30 MCG/0.3 ML IM: CPT

## 2021-03-20 ENCOUNTER — HEALTH MAINTENANCE LETTER (OUTPATIENT)
Age: 81
End: 2021-03-20

## 2021-05-27 ENCOUNTER — TRANSFERRED RECORDS (OUTPATIENT)
Dept: HEALTH INFORMATION MANAGEMENT | Facility: CLINIC | Age: 81
End: 2021-05-27

## 2021-08-16 DIAGNOSIS — Z78.0 MENOPAUSE: ICD-10-CM

## 2021-08-17 NOTE — TELEPHONE ENCOUNTER
Routing refill request to provider for review/approval because:  Last BP high   BP Readings from Last 3 Encounters:   12/16/19 (!) 168/92   10/09/19 (!) 149/87   04/24/19 130/74

## 2021-09-04 ENCOUNTER — HEALTH MAINTENANCE LETTER (OUTPATIENT)
Age: 81
End: 2021-09-04

## 2021-09-11 DIAGNOSIS — I10 ESSENTIAL HYPERTENSION, BENIGN: Chronic | ICD-10-CM

## 2021-09-14 NOTE — TELEPHONE ENCOUNTER
Last visit 9/30/2020 - virtual     Patient due for appointment - sent [employer] message asking pt to schedule     Routing refill request to provider for review/approval because:  No recent BP (last visit was virtual) - pended 30 day lexus refill     Brionna TONEY RN

## 2021-09-15 ENCOUNTER — HOSPITAL ENCOUNTER (OUTPATIENT)
Dept: MAMMOGRAPHY | Facility: CLINIC | Age: 81
Discharge: HOME | End: 2021-09-15
Admitting: INTERNAL MEDICINE
Payer: COMMERCIAL

## 2021-09-15 DIAGNOSIS — Z12.31 VISIT FOR SCREENING MAMMOGRAM: ICD-10-CM

## 2021-09-15 PROCEDURE — 77063 BREAST TOMOSYNTHESIS BI: CPT

## 2021-09-16 ENCOUNTER — TRANSFERRED RECORDS (OUTPATIENT)
Dept: HEALTH INFORMATION MANAGEMENT | Facility: CLINIC | Age: 81
End: 2021-09-16
Payer: COMMERCIAL

## 2021-09-16 LAB
ALT SERPL-CCNC: 20 IU/L (ref 5–35)
AST SERPL-CCNC: 24 U/L (ref 5–34)
CREATININE (EXTERNAL): 0.58 MG/DL (ref 0.5–1.3)
GFR ESTIMATED (EXTERNAL): 106.9 ML/MIN/1.73M2

## 2021-09-16 RX ORDER — METOPROLOL SUCCINATE 50 MG/1
50 TABLET, EXTENDED RELEASE ORAL DAILY
Qty: 30 TABLET | Refills: 0 | Status: SHIPPED | OUTPATIENT
Start: 2021-09-16 | End: 2021-09-29

## 2021-09-29 ENCOUNTER — OFFICE VISIT (OUTPATIENT)
Dept: FAMILY MEDICINE | Facility: CLINIC | Age: 81
End: 2021-09-29
Payer: COMMERCIAL

## 2021-09-29 VITALS
SYSTOLIC BLOOD PRESSURE: 164 MMHG | DIASTOLIC BLOOD PRESSURE: 95 MMHG | HEART RATE: 77 BPM | BODY MASS INDEX: 26.75 KG/M2 | TEMPERATURE: 96.8 F | WEIGHT: 151 LBS | RESPIRATION RATE: 16 BRPM | HEIGHT: 63 IN | OXYGEN SATURATION: 95 %

## 2021-09-29 DIAGNOSIS — Z78.0 MENOPAUSE: ICD-10-CM

## 2021-09-29 DIAGNOSIS — I10 ESSENTIAL HYPERTENSION, BENIGN: Chronic | ICD-10-CM

## 2021-09-29 DIAGNOSIS — E78.5 HYPERLIPIDEMIA, UNSPECIFIED HYPERLIPIDEMIA TYPE: ICD-10-CM

## 2021-09-29 DIAGNOSIS — Z00.00 ENCOUNTER FOR MEDICARE ANNUAL WELLNESS EXAM: Primary | ICD-10-CM

## 2021-09-29 PROCEDURE — 99397 PER PM REEVAL EST PAT 65+ YR: CPT | Performed by: INTERNAL MEDICINE

## 2021-09-29 RX ORDER — ATORVASTATIN CALCIUM 20 MG/1
20 TABLET, FILM COATED ORAL DAILY
Qty: 90 TABLET | Refills: 3 | Status: SHIPPED | OUTPATIENT
Start: 2021-09-29 | End: 2022-09-06

## 2021-09-29 RX ORDER — METOPROLOL SUCCINATE 50 MG/1
50 TABLET, EXTENDED RELEASE ORAL DAILY
Qty: 90 TABLET | Refills: 3 | Status: SHIPPED | OUTPATIENT
Start: 2021-09-29 | End: 2022-11-30

## 2021-09-29 ASSESSMENT — ACTIVITIES OF DAILY LIVING (ADL): CURRENT_FUNCTION: NO ASSISTANCE NEEDED

## 2021-09-29 ASSESSMENT — MIFFLIN-ST. JEOR: SCORE: 1134.06

## 2021-09-29 NOTE — PROGRESS NOTES
"SUBJECTIVE:   Ashanti Jarvis is a 80 year old female who presents for Preventive Visit.    Patient has been advised of split billing requirements and indicates understanding: Yes   Are you in the first 12 months of your Medicare coverage?  No    Healthy Habits:     In general, how would you rate your overall health?  Good    Frequency of exercise:  6-7 days/week    Duration of exercise:  15-30 minutes    Do you usually eat at least 4 servings of fruit and vegetables a day, include whole grains    & fiber and avoid regularly eating high fat or \"junk\" foods?  Yes    Taking medications regularly:  Yes    Barriers to taking medications:  None    Medication side effects:  None    Ability to successfully perform activities of daily living:  No assistance needed    Home Safety:  No safety concerns identified    Hearing Impairment:  Difficulty following a conversation in a noisy restaurant or crowded room, difficult to understand a speaker at a public meeting or M[employer] service and need to ask people to speak up or repeat themselves    In the past 6 months, have you been bothered by leaking of urine? Yes    In general, how would you rate your overall mental or emotional health?  Good      PHQ-2 Total Score: 0    Additional concerns today:  Yes    Do you feel safe in your environment? Yes    Have you ever done Advance Care Planning? (For example, a Health Directive, POLST, or a discussion with a medical provider or your loved ones about your wishes): No, advance care planning information given to patient to review.  Patient plans to discuss their wishes with loved ones or provider.        Fall risk  Fallen 2 or more times in the past year?: No  Any fall with injury in the past year?: No    Cognitive Screening   1) Repeat 3 items (Leader, Season, Table)    2) Clock draw: NORMAL  3) 3 item recall: Recalls 2 objects   Results: NORMAL clock, 1-2 items recalled: COGNITIVE IMPAIRMENT LESS LIKELY    Mini-CogTM Copyright S " Huma. Licensed by the author for use in [facility]; reprinted with permission (ge@[facility]). All rights reserved.      Do you have sleep apnea, excessive snoring or daytime drowsiness?: no    Reviewed and updated as needed this visit by clinical staff   Allergies  Meds              Reviewed and updated as needed this visit by Provider                Social History     Tobacco Use     Smoking status: Former Smoker     Packs/day: 0.10     Years: 1.00     Pack years: 0.10     Types: Cigarettes     Quit date: 1962     Years since quittin.7     Smokeless tobacco: Never Used     Tobacco comment: tried in college   Substance Use Topics     Alcohol use: Yes     Alcohol/week: 3.0 standard drinks     Types: 3 Standard drinks or equivalent per week     Comment:  abt 2 glasses wine per week      If you drink alcohol do you typically have >3 drinks per day or >7 drinks per week? No    Alcohol Use 2021   Prescreen: >3 drinks/day or >7 drinks/week? No         Current providers sharing in care for this patient include:   Patient Care Team:  Chantale Can MD as PCP - General (Internal Medicine)  Chantale Can MD as Assigned PCP    The following health maintenance items are reviewed in Epic and correct as of today:  Health Maintenance Due   Topic Date Due     ANNUAL REVIEW OF HM ORDERS  Never done     HEPATITIS C SCREENING  Never done     ZOSTER IMMUNIZATION (1 of 2) Never done     ADVANCE CARE PLANNING  2017     MEDICARE ANNUAL WELLNESS VISIT  10/09/2020     FALL RISK ASSESSMENT  10/09/2020     PHQ-2  2021     Labs reviewed in EPIC      Pertinent mammograms are reviewed under the imaging tab.    Review of Systems  Constitutional, HEENT, cardiovascular, pulmonary, GI, , musculoskeletal, neuro, skin, endocrine and psych systems are negative, except as otherwise noted.    OBJECTIVE:   BP (!) 164/95 (BP Location: Right arm, Patient Position: Sitting, Cuff Size: Adult Regular)    "Pulse 77   Temp 96.8  F (36  C) (Temporal)   Resp 16   Ht 1.6 m (5' 3\")   Wt 68.5 kg (151 lb)   SpO2 95%   BMI 26.75 kg/m   Estimated body mass index is 25.69 kg/m  as calculated from the following:    Height as of 10/9/19: 1.6 m (5' 3\").    Weight as of 12/16/19: 65.8 kg (145 lb).  Physical Exam  GENERAL: alert and no distress  EYES: Eyes grossly normal to inspection, PERRL and conjunctivae and sclerae normal  HENT: ear canals and TM's normal, nose and mouth without ulcers or lesions  NECK: no adenopathy, no asymmetry, masses, or scars and thyroid normal to palpation  RESP: lungs clear to auscultation - no rales, rhonchi or wheezes  CV: regular rate and rhythm, normal S1 S2, no S3 or S4, no murmur, click or rub, no peripheral edema and peripheral pulses strong  ABDOMEN: soft, nontender, no hepatosplenomegaly, no masses and bowel sounds normal  MS: no gross musculoskeletal defects noted, no edema  SKIN: no suspicious lesions or rashes  NEURO: Normal strength and tone, mentation intact and speech normal  PSYCH: mentation appears normal, affect normal/bright    Diagnostic Test Results:  Labs reviewed in Epic    ASSESSMENT / PLAN:   Ashanti was seen today for physical.    Diagnoses and all orders for this visit:    Encounter for Medicare annual wellness exam    Essential hypertension, benign  -     metoprolol succinate ER (TOPROL-XL) 50 MG 24 hr tablet; Take 1 tablet (50 mg) by mouth daily    Menopause  -     estrogen conj (PREMARIN) 0.625 MG tablet; Take 1 tablet (0.625 mg) by mouth daily    Hyperlipidemia, unspecified hyperlipidemia type  -     atorvastatin (LIPITOR) 20 MG tablet; Take 1 tablet (20 mg) by mouth daily        Patient has been advised of split billing requirements and indicates understanding: Yes  COUNSELING:  Reviewed preventive health counseling, as reflected in patient instructions  Special attention given to:       Regular exercise       Healthy diet/nutrition    Estimated body mass index is " "25.69 kg/m  as calculated from the following:    Height as of 10/9/19: 1.6 m (5' 3\").    Weight as of 12/16/19: 65.8 kg (145 lb).    Weight management plan: Discussed healthy diet and exercise guidelines    She reports that she quit smoking about 59 years ago. Her smoking use included cigarettes. She has a 0.10 pack-year smoking history. She has never used smokeless tobacco.      Appropriate preventive services were discussed with this patient, including applicable screening as appropriate for cardiovascular disease, diabetes, osteopenia/osteoporosis, and glaucoma.  As appropriate for age/gender, discussed screening for colorectal cancer, prostate cancer, breast cancer, and cervical cancer. Checklist reviewing preventive services available has been given to the patient.    Reviewed patients plan of care and provided an AVS. The Basic Care Plan (routine screening as documented in Health Maintenance) for Ashanti meets the Care Plan requirement. This Care Plan has been established and reviewed with the Patient.    Counseling Resources:  ATP IV Guidelines  Pooled Cohorts Equation Calculator  Breast Cancer Risk Calculator  Breast Cancer: Medication to Reduce Risk  FRAX Risk Assessment  ICSI Preventive Guidelines  Dietary Guidelines for Americans, 2010  [employer]'s MyPlate  ASA Prophylaxis  Lung CA Screening    Chantale Can MD  [facility]    Identified Health Risks:  "

## 2021-09-29 NOTE — PATIENT INSTRUCTIONS
Patient Education   Personalized Prevention Plan  You are due for the preventive services outlined below.  Your care team is available to assist you in scheduling these services.  If you have already completed any of these items, please share that information with your care team to update in your medical record.  Health Maintenance Due   Topic Date Due     ANNUAL REVIEW OF  ORDERS  Never done     Hepatitis C Screening  Never done     Zoster (Shingles) Vaccine (1 of 2) Never done     FALL RISK ASSESSMENT  10/09/2020       Signs of Hearing Loss      Hearing much better with one ear can be a sign of hearing loss.   Hearing loss is a problem shared by many people. In fact, it is one of the most common health problems, particularly as people age. Most people age 65 and older have some hearing loss. By age 80, almost everyone does. Hearing loss often occurs slowly over the years. So you may not realize your hearing has gotten worse.  Have your hearing checked  Call your healthcare provider if you:    Have to strain to hear normal conversation    Have to watch other people s faces very carefully to follow what they re saying    Need to ask people to repeat what they ve said    Often misunderstand what people are saying    Turn the volume of the television or radio up so high that others complain    Feel that people are mumbling when they re talking to you    Find that the effort to hear leaves you feeling tired and irritated    Notice, when using the phone, that you hear better with one ear than the other  Jigsee last reviewed this educational content on 1/1/2020 2000-2021 [employer]. All rights reserved. This information is not intended as a substitute for professional medical care. Always follow your healthcare professional's instructions.          Urinary Incontinence, Female (Adult)   Urinary incontinence means loss of bladder control. This problem affects many women, especially as they get  older. If you have incontinence, you may be embarrassed to ask for help. But know that this problem can be treated.   Types of Incontinence  There are different types of incontinence. Two of the main types are described here. You can have more than one type.     Stress incontinence. With this type, urine leaks when pressure (stress) is put on the bladder. This may happen when you cough, sneeze, or laugh. Stress incontinence most often occurs because the pelvic floor muscles that support the bladder and urethra are weak. This can happen after pregnancy and vaginal childbirth or a hysterectomy. It can also be due to excess body weight or hormone changes.    Urge incontinence (also called overactive bladder). With this type, a sudden urge to urinate is felt often. This may happen even though there may not be much urine in the bladder. The need to urinate often during the night is common. Urge incontinence most often occurs because of bladder spasms. This may be due to bladder irritation or infection. Damage to bladder nerves or pelvic muscles, constipation, and certain medicines can also lead to urge incontinence.  Treatment depends on the cause. Further evaluation is needed to find the type you have. This will likely include an exam and certain tests. Based on the results, you and your healthcare provider can then plan treatment. Until a diagnosis is made, the home care tips below can help ease symptoms.   Home care    Do pelvic floor muscle exercises, if they are prescribed. The pelvic floor muscles help support the bladder and urethra. Many women find that their symptoms improve when doing special exercises that strengthen these muscles. To do the exercises, contract the muscles you would use to stop your stream of urine. But do this when you re not urinating. Hold for 10 seconds, then relax. Repeat 10 to 20 times in a row, at least 3 times a day. Your healthcare provider may give you other instructions for how to  do the exercises and how often.    Keep a bladder diary. This helps track how often and how much you urinate over a set period of time. Bring this diary with you to your next visit with the provider. The information can help your provider learn more about your bladder problem.    Lose weight, if advised to by your provider. Extra weight puts pressure on the bladder. Your provider can help you create a weight-loss plan that s right for you. This may include exercising more and making certain diet changes.    Don't have foods and drinks that may irritate the bladder. These can include alcohol and caffeinated drinks.    Quit smoking. Smoking and other tobacco use can lead to a long-term (chronic) cough that strains the pelvic floor muscles. Smoking may also damage the bladder and urethra. Talk with your provider about treatments or methods you can use to quit smoking.    If drinking large amounts of fluid makes you have symptoms, you may be advised to limit your fluid intake. You may also be advised to drink most of your fluids during the day and to limit fluids at night.    If you re worried about urine leakage or accidents, you may wear absorbent pads to catch urine. Change the pads often. This helps reduce discomfort. It may also reduce the risk of skin or bladder infections.    Follow-up care  Follow up with your healthcare provider, or as directed. It may take some to find the right treatment for your problem. But healthy lifestyle changes can be made right away. These include such things as exercising on a regular basis, eating a healthy diet, losing weight (if needed), and quitting smoking. Your treatment plan may include special therapies or medicines. Certain procedures or surgery may also be options. Talk about any questions you have with your provider.   When to seek medical advice  Call the healthcare provider right away if any of these occur:    Fever of 100.4 F (38 C) or higher, or as directed by your  provider    Bladder pain or fullness    Belly swelling    Nausea or vomiting    Back pain    Weakness, dizziness, or fainting  Neri last reviewed this educational content on 1/1/2020 2000-2021 [employer]. All rights reserved. This information is not intended as a substitute for professional medical care. Always follow your healthcare professional's instructions.

## 2021-10-04 ENCOUNTER — MYC MEDICAL ADVICE (OUTPATIENT)
Dept: FAMILY MEDICINE | Facility: CLINIC | Age: 81
End: 2021-10-04

## 2021-10-06 NOTE — TELEPHONE ENCOUNTER
Paris called to reschedule her reclast appointment.  I informed her that she wasn't scheduled.  Paris said that she was scheduled.  With the help of the pharmacy, I looked in patient chart.  Therapy plan orders were past due by a few years and Dr Can hasn't ordered a reclast for a while now.  I returned to my phone call with Paris and informed her that I didn't have any updated orders.  She apologized and informed me that she forgot and she gets her reclast thru rheumatology. [email or web address]

## 2022-01-04 DIAGNOSIS — Z78.0 MENOPAUSE: ICD-10-CM

## 2022-01-05 NOTE — TELEPHONE ENCOUNTER
Routing refill request to provider for review/approval because:  BP Readings from Last 3 Encounters:   09/29/21 (!) 164/95   12/16/19 (!) 168/92   10/09/19 (!) 149/87     BPs high   Brionna TONEY, Triage RN  [facility]

## 2022-04-25 ENCOUNTER — OFFICE VISIT (OUTPATIENT)
Dept: URGENT CARE | Facility: URGENT CARE | Age: 82
End: 2022-04-25
Payer: COMMERCIAL

## 2022-04-25 VITALS
HEART RATE: 99 BPM | WEIGHT: 152 LBS | BODY MASS INDEX: 26.93 KG/M2 | TEMPERATURE: 97.6 F | SYSTOLIC BLOOD PRESSURE: 212 MMHG | OXYGEN SATURATION: 95 % | DIASTOLIC BLOOD PRESSURE: 129 MMHG

## 2022-04-25 DIAGNOSIS — G56.01 CARPAL TUNNEL SYNDROME OF RIGHT WRIST: Primary | ICD-10-CM

## 2022-04-25 PROCEDURE — 99213 OFFICE O/P EST LOW 20 MIN: CPT

## 2022-04-26 NOTE — PROGRESS NOTES
SUBJECTIVE:  Patient presents with tingling in fingers of rt hand with occasion ache up the arm to elbow for one month.  Patient is left handed and denies overuse of rt hand.    Past Medical History:   Diagnosis Date     Benign neoplasm of colon 5/01    tubular adenoma; one non-adenomatous polyp 2012 - rec f/u 5 years     Essential hypertension, benign      Essential tremor      Hx estrogen therapy     Since hysterectomy (estimates ~30 years as of Sept 2014)     Intracranial abscess Dec 2012    H/o sinusitis and intracranial abscess     Lichen planus      Migraine, unspecified, without mention of intractable migraine without mention of status migrainosus 1963     Osteoporosis     Reclast 10/11 --> drug holiday 2015 per rheum     Other isolated or specific phobias     Claustrophobia     Other kyphoscoliosis and scoliosis      Pemphigoid, benign mucous membrane      Personal history of urinary calculi      Rheumatoid arthritis(714.0) 1991    Followed by Dr Vandana Merino at [facility]     Sjogren's syndrome (H)     due to meds     Unspecified tinnitus     Left ear     Urge urinary incontinence      Current Outpatient Medications   Medication Sig Dispense Refill     atorvastatin (LIPITOR) 20 MG tablet Take 1 tablet (20 mg) by mouth daily 90 tablet 3     atorvastatin (LIPITOR) 20 MG tablet Take 1 tablet (20 mg) by mouth daily 90 tablet 3     cevimeline (EVOXAC) 30 MG capsule Take 30 mg by mouth 3 times daily        dexamethasone 0.5 MG/5ML ELIX Swish and spit in mouth daily as needed (2 weeks at a time)        estrogen conj (PREMARIN) 0.625 MG tablet Take 1 tablet (0.625 mg) by mouth daily - Fasting annual exam due September 2022 30 tablet 9     estrogen conj (PREMARIN) 0.625 MG tablet Take 1 tablet (0.625 mg) by mouth daily 30 tablet 0     leflunomide (ARAVA) 20 MG tablet Take 1 tablet by mouth daily       metoprolol succinate ER (TOPROL-XL) 50 MG 24 hr tablet Take 1 tablet (50 mg) by mouth  daily 90 tablet 3     multivitamin, therapeutic with minerals (MULTI-VITAMIN) TABS Take 1 tablet by mouth daily 100 tablet 3     Probiotic Product (PROBIOTIC FORMULA PO) Take  by mouth daily. (Patient not taking: Reported on 4/25/2022)       UNABLE TO FIND MEDICATION NAME: Ana Rosa mouthwash swish and spit daily as needed       Vitamin D, Cholecalciferol, 1000 UNITS CAPS Take 1 capsule by mouth daily (Patient not taking: Reported on 4/25/2022)       History   Smoking Status     Former Smoker     Packs/day: 0.10     Years: 1.00     Types: Cigarettes     Quit date: 1/1/1962   Smokeless Tobacco     Never Used     Comment: tried in college         OBJECITVE;  BP (!) 212/129   Pulse 99   Temp 97.6  F (36.4  C) (Oral)   Wt 68.9 kg (152 lb)   SpO2 95%   BMI 26.93 kg/m    Alert cooperative  Good  strength both hands. Perfusion normal. Increase tingling with prolonged wrist flexion    ASSESSMENT:  Carpal tunnel syndrome rt    PLAN: printed information on carpal tunnel.  wrist splint to be worn at night for two weeks.    Follow up with primary care provider if no improvement.

## 2022-07-12 ENCOUNTER — MYC MEDICAL ADVICE (OUTPATIENT)
Dept: GENERAL RADIOLOGY | Facility: CLINIC | Age: 82
End: 2022-07-12

## 2022-07-12 NOTE — TELEPHONE ENCOUNTER
metoprolol succinate ER (TOPROL-XL) 50 MG 24 hr tablet 90 tablet 3 9/29/2021  No   Sig - Route: Take 1 tablet (50 mg) by mouth daily - Oral   Sent to pharmacy as: Metoprolol Succinate ER 50 MG Oral Tablet Extended Release 24 Hour (TOPROL-XL)   Class: E-Prescribe   Order: [id number]   E-Prescribing Status: Receipt confirmed by pharmacy (9/29/2021  1:05 PM CDT)     Pharmacy    [facility] [place], [place] - [address] [place] [address]     Pharmacy seeking refill of metoprolol.  Last Rx'd on 9- good for one year .    Too soon to renew.    Fax sent back to pharmacy - check records for Rx; not due to renew.    RT Leonard (R)

## 2022-09-02 DIAGNOSIS — E78.5 HYPERLIPIDEMIA, UNSPECIFIED HYPERLIPIDEMIA TYPE: ICD-10-CM

## 2022-09-02 NOTE — TELEPHONE ENCOUNTER
Last office visit: 9/29/2021 with prescribing provider:  Pamella   Future Office Visit:   Next 5 appointments (look out 90 days)    Sep 20, 2022  3:00 PM  (Arrive by 2:40 PM)  Provider Visit with Luis Solorio MD  [facility] ([facility] [place] ) [address] [facility], [address]  [facility] [address]  [phone]

## 2022-09-06 NOTE — TELEPHONE ENCOUNTER
Routing refill request to provider for review/approval because:  Labs not current:          LOV: 9/29/21     Future OV:  9/20/2022 3:00 PM (Arrive by 2:40 PM) Luis Solorio MD [facility]     Erasmo Gutierrez RN  [facility]

## 2022-09-08 ENCOUNTER — TELEPHONE (OUTPATIENT)
Dept: FAMILY MEDICINE | Facility: CLINIC | Age: 82
End: 2022-09-08

## 2022-09-08 NOTE — TELEPHONE ENCOUNTER
Pt called stating pharmacy contacted her and told her to call her doctor regarding refill.     Pt has 2 weeks left of medication at home.     Pt was scheduled for visit with primary 11/30/2022.

## 2022-09-09 RX ORDER — ATORVASTATIN CALCIUM 20 MG/1
20 TABLET, FILM COATED ORAL DAILY
Qty: 30 TABLET | Refills: 0 | Status: SHIPPED | OUTPATIENT
Start: 2022-09-09 | End: 2022-09-30

## 2022-09-30 DIAGNOSIS — E78.5 HYPERLIPIDEMIA, UNSPECIFIED HYPERLIPIDEMIA TYPE: ICD-10-CM

## 2022-09-30 NOTE — TELEPHONE ENCOUNTER
Appointments in Next Year    Nov 30, 2022 10:00 AM  (Arrive by 9:40 AM)  Provider Visit with Chantale Can MD  [facility] ([facility] ) [phone]        RT Leonard (R)

## 2022-10-04 RX ORDER — ATORVASTATIN CALCIUM 20 MG/1
20 TABLET, FILM COATED ORAL DAILY
Qty: 90 TABLET | Refills: 0 | Status: SHIPPED | OUTPATIENT
Start: 2022-10-04 | End: 2022-11-30 | Stop reason: CLARIF

## 2022-10-08 DIAGNOSIS — Z78.0 MENOPAUSE: ICD-10-CM

## 2022-10-08 NOTE — TELEPHONE ENCOUNTER
Future Office Visit:   Next 5 appointments (look out 90 days)    Nov 30, 2022 10:00 AM  (Arrive by 9:40 AM)  Provider Visit with Chantale Can MD  [facility] ([facility] ) [address] [facility], [address]  [facility] [address]  [phone]

## 2022-10-11 NOTE — TELEPHONE ENCOUNTER
Routing refill request to provider for review/approval because:  BP out of range   BP Readings from Last 3 Encounters:   04/25/22 (!) 212/129   09/29/21 (!) 164/95   12/16/19 (!) 168/92      Pt due for a visit with PCP- next appt scheduled for 11/30/22.

## 2022-10-14 ENCOUNTER — MYC MEDICAL ADVICE (OUTPATIENT)
Dept: FAMILY MEDICINE | Facility: CLINIC | Age: 82
End: 2022-10-14

## 2022-10-14 DIAGNOSIS — Z78.0 MENOPAUSE: ICD-10-CM

## 2022-10-16 ENCOUNTER — HEALTH MAINTENANCE LETTER (OUTPATIENT)
Age: 82
End: 2022-10-16

## 2022-10-20 NOTE — TELEPHONE ENCOUNTER
Per chart review, unclear of which medication was requested by patient, mychart sent to patient to clarify. Denial on 10/18 was in error, no duplicate.

## 2022-10-20 NOTE — TELEPHONE ENCOUNTER
Per chart review on 10/8/22 refill request for Premarin was refused by PCP. Patient needs to schedule follow-up visit. TC scheduled future visit with patient for 11/30/22.     This MyChart request is for a refill of Premarin (per MyChart subject). Refill request was not a duplicate as writer previously documented but was refused by PCP.    Writer sent MyChart message notifying patient that they need a visit with PCP prior to the medication being refilled which patient has scheduled for 11/30/22.

## 2022-11-30 ENCOUNTER — OFFICE VISIT (OUTPATIENT)
Dept: FAMILY MEDICINE | Facility: CLINIC | Age: 82
End: 2022-11-30
Payer: COMMERCIAL

## 2022-11-30 VITALS
BODY MASS INDEX: 25.87 KG/M2 | TEMPERATURE: 97.7 F | RESPIRATION RATE: 16 BRPM | HEIGHT: 63 IN | OXYGEN SATURATION: 94 % | HEART RATE: 70 BPM | SYSTOLIC BLOOD PRESSURE: 170 MMHG | WEIGHT: 146 LBS | DIASTOLIC BLOOD PRESSURE: 69 MMHG

## 2022-11-30 DIAGNOSIS — Z00.00 WELLNESS EXAMINATION: Primary | ICD-10-CM

## 2022-11-30 DIAGNOSIS — H61.23 BILATERAL IMPACTED CERUMEN: ICD-10-CM

## 2022-11-30 DIAGNOSIS — I10 ESSENTIAL HYPERTENSION, BENIGN: Chronic | ICD-10-CM

## 2022-11-30 PROCEDURE — 69209 REMOVE IMPACTED EAR WAX UNI: CPT | Performed by: INTERNAL MEDICINE

## 2022-11-30 PROCEDURE — G0439 PPPS, SUBSEQ VISIT: HCPCS | Performed by: INTERNAL MEDICINE

## 2022-11-30 RX ORDER — METOPROLOL SUCCINATE 50 MG/1
50 TABLET, EXTENDED RELEASE ORAL DAILY
Qty: 90 TABLET | Refills: 3 | Status: SHIPPED | OUTPATIENT
Start: 2022-11-30 | End: 2023-09-28

## 2022-11-30 RX ORDER — AMLODIPINE BESYLATE 2.5 MG/1
2.5 TABLET ORAL DAILY
Qty: 90 TABLET | Refills: 3 | Status: SHIPPED | OUTPATIENT
Start: 2022-11-30 | End: 2023-05-09 | Stop reason: DRUGHIGH

## 2022-11-30 ASSESSMENT — PAIN SCALES - GENERAL: PAINLEVEL: NO PAIN (0)

## 2022-11-30 ASSESSMENT — ACTIVITIES OF DAILY LIVING (ADL): CURRENT_FUNCTION: NO ASSISTANCE NEEDED

## 2022-11-30 NOTE — PROGRESS NOTES
Patient identified using two patient identifiers.  Ear exam showing wax occlusion completed by provider.  Solution: warm water was placed in the bilateral ear(s) via irrigation tool: elephant ear.  Colton Cho CMA on 11/30/2022 at 10:28 AM

## 2022-11-30 NOTE — PROGRESS NOTES
"amlodipiSUBJECTIVE:   Paris is a 82 year old who presents for Preventive Visit.  Patient has been advised of split billing requirements and indicates understanding: Yes  Are you in the first 12 months of your Medicare coverage?  No    Healthy Habits:     In general, how would you rate your overall health?  Good    Frequency of exercise:  None    Duration of exercise:  Less than 15 minutes    Do you usually eat at least 4 servings of fruit and vegetables a day, include whole grains    & fiber and avoid regularly eating high fat or \"junk\" foods?  No    Taking medications regularly:  Yes    Medication side effects:  None    Ability to successfully perform activities of daily living:  No assistance needed    Home Safety:  No safety concerns identified    Hearing Impairment:  Difficulty understanding soft or whispered speech    In the past 6 months, have you been bothered by leaking of urine? Yes    In general, how would you rate your overall mental or emotional health?  Very good      PHQ-2 Total Score: 0    Additional concerns today:  Yes      Have you ever done Advance Care Planning? (For example, a Health Directive, POLST, or a discussion with a medical provider or your loved ones about your wishes): No, advance care planning information given to patient to review.  Patient plans to discuss their wishes with loved ones or provider.      Fall risk       Cognitive Screening   1) Repeat 3 items (Leader, Season, Table)    2) Clock draw: NORMAL  3) 3 item recall: Recalls 3 objects  Results: 3 items recalled: COGNITIVE IMPAIRMENT LESS LIKELY    Mini-CogTM Copyright CORNELL Finn. Licensed by the author for use in [facility]; reprinted with permission (ge@.[facility]). All rights reserved.      Do you have sleep apnea, excessive snoring or daytime drowsiness?: no    Reviewed and updated as needed this visit by clinical staff                  Reviewed and updated as needed this visit by Provider                 Social " "History     Tobacco Use     Smoking status: Former     Packs/day: 0.10     Years: 1.00     Pack years: 0.10     Types: Cigarettes     Quit date: 1962     Years since quittin.9     Smokeless tobacco: Never     Tobacco comments:     tried in college   Substance Use Topics     Alcohol use: Yes     Alcohol/week: 3.0 standard drinks     Types: 3 Standard drinks or equivalent per week     Comment:  abt 2 glasses wine per week      If you drink alcohol do you typically have >3 drinks per day or >7 drinks per week? No    Alcohol Use 2021   Prescreen: >3 drinks/day or >7 drinks/week? No       Current providers sharing in care for this patient include:   Patient Care Team:  Chantale Can MD as PCP - General (Internal Medicine)  Chantale Can MD as Assigned PCP    The following health maintenance items are reviewed in Epic and correct as of today:  Health Maintenance   Topic Date Due     ANNUAL REVIEW OF HM ORDERS  Never done     ZOSTER IMMUNIZATION (1 of 2) Never done     COLORECTAL CANCER SCREENING  2022     MAMMO SCREENING  09/15/2022     MEDICARE ANNUAL WELLNESS VISIT  2022     FALL RISK ASSESSMENT  2022     DEXA  10/04/2022     DTAP/TDAP/TD IMMUNIZATION (4 - Td or Tdap) 07/15/2023     LIPID  2024     ADVANCE CARE PLANNING  2026     MIGRAINE ACTION PLAN  Completed     PHQ-2 (once per calendar year)  Completed     INFLUENZA VACCINE  Completed     Pneumococcal Vaccine: 65+ Years  Completed     COVID-19 Vaccine  Completed     IPV IMMUNIZATION  Aged Out     MENINGITIS IMMUNIZATION  Aged Out     Labs reviewed in EPIC    Pertinent mammograms are reviewed under the imaging tab.    Review of Systems  Constitutional, HEENT, cardiovascular, pulmonary, GI, , musculoskeletal, neuro, skin, endocrine and psych systems are negative, except as otherwise noted.    OBJECTIVE:   BP (!) 170/69   Pulse 70   Temp 97.7  F (36.5  C) (Temporal)   Resp 16   Ht 1.6 m (5' 3\")   Wt 66.2 kg " "(146 lb)   SpO2 94%   BMI 25.86 kg/m   Estimated body mass index is 26.93 kg/m  as calculated from the following:    Height as of 9/29/21: 1.6 m (5' 3\").    Weight as of 4/25/22: 68.9 kg (152 lb).  Physical Exam  GENERAL: alert and no distress  EYES: Eyes grossly normal to inspection, PERRL and conjunctivae and sclerae normal  HENT: bilateral ears cerumen impaction symptoms noted, nose and mouth without ulcers or lesions  NECK: no adenopathy, no asymmetry, masses, or scars and thyroid normal to palpation  RESP: lungs clear to auscultation - no rales, rhonchi or wheezes  CV: regular rate and rhythm, normal S1 S2, no S3 or S4, no murmur, click or rub, no peripheral edema and peripheral pulses strong  ABDOMEN: soft, nontender, no hepatosplenomegaly, no masses and bowel sounds normal  MS: no gross musculoskeletal defects noted, no edema  SKIN: no suspicious lesions or rashes  NEURO: Normal strength and tone, mentation intact and speech normal  PSYCH: mentation appears normal, affect normal/bright    Diagnostic Test Results:  Labs reviewed in Epic    ASSESSMENT / PLAN:   Ashanti was seen today for physical.    Diagnoses and all orders for this visit:    Wellness examination  -     REVIEW OF HEALTH MAINTENANCE PROTOCOL ORDERS    Essential hypertension, benign  -     metoprolol succinate ER (TOPROL XL) 50 MG 24 hr tablet; Take 1 tablet (50 mg) by mouth daily  -     amLODIPine (NORVASC) 2.5 MG tablet; Take 1 tablet (2.5 mg) by mouth daily    Bilateral impacted cerumen  -     REMOVE IMPACTED CERUMEN    patient's TM's are clear, visible in both ears after cerumen removal in clinic today.      Patient has been advised of split billing requirements and indicates understanding: Yes      COUNSELING:  Reviewed preventive health counseling, as reflected in patient instructions  Special attention given to:       Regular exercise       Healthy diet/nutrition        She reports that she quit smoking about 60 years ago. Her smoking " use included cigarettes. She has a 0.10 pack-year smoking history. She has never used smokeless tobacco.      Appropriate preventive services were discussed with this patient, including applicable screening as appropriate for cardiovascular disease, diabetes, osteopenia/osteoporosis, and glaucoma.  As appropriate for age/gender, discussed screening for colorectal cancer, prostate cancer, breast cancer, and cervical cancer. Checklist reviewing preventive services available has been given to the patient.    Reviewed patients plan of care and provided an AVS. The Basic Care Plan (routine screening as documented in Health Maintenance) for Ashanti meets the Care Plan requirement. This Care Plan has been established and reviewed with the Patient.      Chantale Can MD  [facility]    Identified Health Risks:

## 2022-12-04 ENCOUNTER — HEALTH MAINTENANCE LETTER (OUTPATIENT)
Age: 82
End: 2022-12-04

## 2022-12-05 DIAGNOSIS — E78.5 HYPERLIPIDEMIA, UNSPECIFIED HYPERLIPIDEMIA TYPE: ICD-10-CM

## 2022-12-07 RX ORDER — ATORVASTATIN CALCIUM 20 MG/1
20 TABLET, FILM COATED ORAL DAILY
Qty: 90 TABLET | Refills: 3 | Status: SHIPPED | OUTPATIENT
Start: 2022-12-07 | End: 2023-09-28

## 2022-12-07 NOTE — TELEPHONE ENCOUNTER
Routing refill request to provider for review/approval because:  Labs not current:        LOV: 11/30/22    Future OV:    12/22/2022 1:00 PM (Arrive by 12:40 PM) Marlin Medellin MD [facility]     Erasmo Gutierrez RN  [facility]

## 2022-12-28 ENCOUNTER — ANCILLARY PROCEDURE (OUTPATIENT)
Dept: MAMMOGRAPHY | Facility: CLINIC | Age: 82
End: 2022-12-28
Attending: INTERNAL MEDICINE
Payer: COMMERCIAL

## 2022-12-28 DIAGNOSIS — Z12.31 VISIT FOR SCREENING MAMMOGRAM: ICD-10-CM

## 2022-12-28 PROCEDURE — 77063 BREAST TOMOSYNTHESIS BI: CPT | Mod: TC | Performed by: RADIOLOGY

## 2022-12-28 PROCEDURE — 77067 SCR MAMMO BI INCL CAD: CPT | Mod: TC | Performed by: RADIOLOGY

## 2023-01-12 DIAGNOSIS — Z78.0 MENOPAUSE: ICD-10-CM

## 2023-01-22 ENCOUNTER — MYC MEDICAL ADVICE (OUTPATIENT)
Dept: FAMILY MEDICINE | Facility: CLINIC | Age: 83
End: 2023-01-22
Payer: COMMERCIAL

## 2023-01-22 DIAGNOSIS — Z78.0 MENOPAUSE: ICD-10-CM

## 2023-02-11 DIAGNOSIS — Z78.0 MENOPAUSE: ICD-10-CM

## 2023-03-09 DIAGNOSIS — Z78.0 MENOPAUSE: ICD-10-CM

## 2023-04-08 DIAGNOSIS — Z78.0 MENOPAUSE: ICD-10-CM

## 2023-05-08 ENCOUNTER — MYC MEDICAL ADVICE (OUTPATIENT)
Dept: FAMILY MEDICINE | Facility: CLINIC | Age: 83
End: 2023-05-08
Payer: COMMERCIAL

## 2023-05-09 ENCOUNTER — NURSE TRIAGE (OUTPATIENT)
Dept: FAMILY MEDICINE | Facility: CLINIC | Age: 83
End: 2023-05-09

## 2023-05-09 ENCOUNTER — OFFICE VISIT (OUTPATIENT)
Dept: FAMILY MEDICINE | Facility: CLINIC | Age: 83
End: 2023-05-09
Payer: COMMERCIAL

## 2023-05-09 VITALS
TEMPERATURE: 98.3 F | HEART RATE: 86 BPM | RESPIRATION RATE: 16 BRPM | OXYGEN SATURATION: 93 % | BODY MASS INDEX: 25.43 KG/M2 | WEIGHT: 143.5 LBS | HEIGHT: 63 IN | SYSTOLIC BLOOD PRESSURE: 162 MMHG | DIASTOLIC BLOOD PRESSURE: 90 MMHG

## 2023-05-09 DIAGNOSIS — J01.90 ACUTE SINUSITIS WITH SYMPTOMS > 10 DAYS: Primary | ICD-10-CM

## 2023-05-09 DIAGNOSIS — I10 ESSENTIAL HYPERTENSION, BENIGN: Chronic | ICD-10-CM

## 2023-05-09 DIAGNOSIS — J30.1 SEASONAL ALLERGIC RHINITIS DUE TO POLLEN: ICD-10-CM

## 2023-05-09 PROCEDURE — 99214 OFFICE O/P EST MOD 30 MIN: CPT | Performed by: PHYSICIAN ASSISTANT

## 2023-05-09 RX ORDER — CETIRIZINE HYDROCHLORIDE 10 MG/1
10 TABLET ORAL DAILY
Qty: 30 TABLET | Refills: 1 | Status: SHIPPED | OUTPATIENT
Start: 2023-05-09

## 2023-05-09 RX ORDER — AMLODIPINE BESYLATE 5 MG/1
5 TABLET ORAL DAILY
Qty: 90 TABLET | Refills: 1 | Status: SHIPPED | OUTPATIENT
Start: 2023-05-09 | End: 2024-02-09

## 2023-05-09 RX ORDER — AMOXICILLIN 500 MG/1
500 CAPSULE ORAL 3 TIMES DAILY
Qty: 30 CAPSULE | Refills: 0 | Status: SHIPPED | OUTPATIENT
Start: 2023-05-09 | End: 2023-05-10

## 2023-05-09 ASSESSMENT — PAIN SCALES - GENERAL: PAINLEVEL: NO PAIN (0)

## 2023-05-09 NOTE — PROGRESS NOTES
HPI: Paris is an 83 yo female here with nasal congestion for about 3 weeks  She did have a negative home covid test  She has green nasal discharge and can't breath through her nose.  Denies cough, fever, sore throat or body aches  She denies HA or dizziness  Has hx of brain abscess in 2012 related to sinus infection.    She does have a hx of seasonal allergies and sporadically takes zyrtec   Typically has sneezing with her allergies and del angel have some of that now  Denies itchy/watery eyes.    Past Medical History:   Diagnosis Date     Benign neoplasm of colon 5/01    tubular adenoma; one non-adenomatous polyp 2012 - rec f/u 5 years     Essential hypertension, benign      Essential tremor      Hx estrogen therapy     Since hysterectomy (estimates ~30 years as of Sept 2014)     Intracranial abscess Dec 2012    H/o sinusitis and intracranial abscess     Lichen planus      Migraine, unspecified, without mention of intractable migraine without mention of status migrainosus 1963     Osteoporosis     Reclast 10/11 --> drug holiday 2015 per rheum     Other isolated or specific phobias     Claustrophobia     Other kyphoscoliosis and scoliosis      Pemphigoid, benign mucous membrane      Personal history of urinary calculi      Rheumatoid arthritis(714.0) 1991    Followed by Dr Vandana Merino at [facility]     Sjogren's syndrome (H)     due to meds     Unspecified tinnitus     Left ear     Urge urinary incontinence      Past Surgical History:   Procedure Laterality Date     APPENDECTOMY       COLONOSCOPY N/A 5/25/2017    Procedure: COLONOSCOPY;  colonoscopy;  Surgeon: Alan Crenshaw MD;  Location:  GI     ENDOSCOPIC BALLOON SINUPLASTY ACCLARENT  12/11/2012    Procedure: ENDOSCOPIC BALLOON SINUPLASTY ACCLARENT;  JOSHUA. MAXILLO SINUS SURGERY,  ;  Surgeon: David Ortega MD;  Location:  OR     HYSTERECTOMY, VAGINAL  1983    has ovaries; secondary to fibroids     KERATOTOMY ARCUATE WITH FEMTOSECOND  LASER/IMAGING FOR ATIOL Right 2016    Procedure: KERATOTOMY ARCUATE WITH FEMTOSECOND LASER/IMAGING FOR ATIOL;  Surgeon: Larry Vilchis MD;  Location:  EC     OPTICAL TRACKING SYSTEM CRANIOTOMY, EXCISE TUMOR, COMBINED  2012    Procedure: COMBINED OPTICAL TRACKING SYSTEM CRANIOTOMY, EXCISE TUMOR;  LEFT FRONTAL CRANIOTOMY, IMAGE GUIDANCE FROM FERMIN PALENCIA. - SUPINE, DEVLIN TONGS;  Surgeon: Tomi Fernandez MD;  Location:  OR     PHACOEMULSIFICATION CLEAR CORNEA WITH STANDARD INTRAOCULAR LENS IMPLANT Left 2015    Procedure: PHACOEMULSIFICATION CLEAR CORNEA WITH STANDARD INTRAOCULAR LENS IMPLANT;  Surgeon: Larry Vilchis MD;  Location:  EC     PHACOEMULSIFICATION CLEAR CORNEA WITH STANDARD INTRAOCULAR LENS IMPLANT Right 2016    Procedure: PHACOEMULSIFICATION CLEAR CORNEA WITH STANDARD INTRAOCULAR LENS IMPLANT;  Surgeon: Larry Vilchis MD;  Location:  EC     ZZC NONSPECIFIC PROCEDURE  1987    pneumonia     Social History     Tobacco Use     Smoking status: Former     Packs/day: 0.10     Years: 1.00     Pack years: 0.10     Types: Cigarettes     Quit date: 1962     Years since quittin.3     Smokeless tobacco: Never     Tobacco comments:     tried in college   Vaping Use     Vaping status: Not on file   Substance Use Topics     Alcohol use: Yes     Alcohol/week: 3.0 standard drinks of alcohol     Types: 3 Standard drinks or equivalent per week     Comment:  abt 2 glasses wine per week      Current Outpatient Medications   Medication Sig Dispense Refill     amLODIPine (NORVASC) 5 MG tablet Take 1 tablet (5 mg) by mouth daily 90 tablet 1     amoxicillin (AMOXIL) 500 MG capsule Take 1 capsule (500 mg) by mouth 3 times daily 30 capsule 0     atorvastatin (LIPITOR) 20 MG tablet Take 1 tablet (20 mg) by mouth daily 90 tablet 3     cetirizine (ZYRTEC) 10 MG tablet Take 1 tablet (10 mg) by mouth daily 30 tablet 1     cevimeline (EVOXAC) 30 MG capsule Take 30 mg by mouth  "3 times daily        dexamethasone 0.5 MG/5ML ELIX Swish and spit in mouth daily as needed (2 weeks at a time)        estrogen conj (PREMARIN) 0.625 MG tablet Take 1 tablet (0.625 mg) by mouth daily 30 tablet 0     leflunomide (ARAVA) 20 MG tablet Take 1 tablet by mouth daily       metoprolol succinate ER (TOPROL XL) 50 MG 24 hr tablet Take 1 tablet (50 mg) by mouth daily 90 tablet 3     multivitamin w/minerals (THERA-VIT-M) tablet Take 1 tablet by mouth daily 100 tablet 3     Probiotic Product (PROBIOTIC FORMULA PO) Take by mouth daily       UNABLE TO FIND MEDICATION NAME: Ana Rosa mouthwash swish and spit daily as needed       Vitamin D, Cholecalciferol, 1000 UNITS CAPS Take 1 capsule by mouth daily       Allergies   Allergen Reactions     Vancomycin Rash     \"like lizard skin all over my body\"       PHYSICAL EXAM:    BP (!) 162/90   Pulse 86   Temp 98.3  F (36.8  C)   Resp 16   Ht 1.6 m (5' 3\")   Wt 65.1 kg (143 lb 8 oz)   SpO2 93%   BMI 25.42 kg/m      Patient appears non toxic  Ears: TMs pearly grey, some dry cerumen  Nose: yellow crust noted  Throat: no erythema or exudates  Neck: supple without LA  Lungs: CTA bilat    Assessment and Plan:     (J01.90) Acute sinusitis with symptoms > 10 days  (primary encounter diagnosis)  Comment:   Plan: amoxicillin (AMOXIL) 500 MG capsule        Tid x 10d.    (J30.1) Seasonal allergic rhinitis due to pollen  Comment:   Plan: cetirizine (ZYRTEC) 10 MG tablet        Qhs    (I10) Essential hypertension, benign  Comment: increased amlodipine from 2.5mg to 5mg every day.  Plan: amLODIPine (NORVASC) 5 MG tablet, Home Blood         Pressure Monitor Order for DME - ONLY FOR DME        Monitor BP and f/u with readings in one month      Lorna Renteria PA-C        "

## 2023-05-09 NOTE — TELEPHONE ENCOUNTER
A very bad cold that persists - lots of phlegm, seems to be largely in my nose and nasal passages  (Newest Message First)  View All Conversations on this Encounter  Paris MORALES Cs Triage Im (supporting Chantale Can MD) Yesterday (9:08 AM)     PB  Do I need to see you?

## 2023-05-09 NOTE — TELEPHONE ENCOUNTER
"Patient calling with sinus congestion last about three weeks. States she is having some green nasal discharge in last three days. Cannot breath through nose. Disposition states to be seen today or tomorrow. Scheduled.  Appointments in Next Year    May 09, 2023  2:30 PM  (Arrive by 2:10 PM)  Provider Visit with Lorna Renteria PA-C  [facility] ([facility] ) [phone]        Gala De La Rosa RN on 5/9/2023 at 10:25 AM    Reason for Disposition    Sinus congestion (pressure, fullness) present > 10 days    Additional Information    Negative: Sounds like a life-threatening emergency to the triager    Negative: Difficulty breathing, and not from stuffy nose (e.g., not relieved by cleaning out the nose)    Negative: SEVERE headache and has fever    Negative: Patient sounds very sick or weak to the triager    Negative: SEVERE sinus pain    Negative: Severe headache    Negative: Redness or swelling on the cheek, forehead, or around the eye    Negative: Fever > 103 F (39.4 C)    Negative: Fever > 101 F (38.3 C) and over 60 years of age    Negative: Fever > 100.0 F (37.8 C) and has diabetes mellitus or a weak immune system (e.g., HIV positive, cancer chemotherapy, organ transplant, splenectomy, chronic steroids)    Negative: Fever > 100.0 F (37.8 C) and bedridden (e.g., nursing home patient, stroke, chronic illness, recovering from surgery)    Negative: Fever present > 3 days (72 hours)    Negative: Fever returns after gone for over 24 hours and symptoms worse or not improved    Negative: Sinus pain (not just congestion) and fever    Negative: Earache    Answer Assessment - Initial Assessment Questions  1. LOCATION: \"Where does it hurt?\"       No pain, just sinus' are plugged  2. ONSET: \"When did the sinus pain start?\"  (e.g., hours, days)       Congestion started about three weeks ago  3. SEVERITY: \"How bad is the pain?\"   (Scale 1-10; mild, moderate or severe)    - MILD (1-3): " "doesn't interfere with normal activities     - MODERATE (4-7): interferes with normal activities (e.g., work or school) or awakens from sleep    - SEVERE (8-10): excruciating pain and patient unable to do any normal activities         Moderate  4. RECURRENT SYMPTOM: \"Have you ever had sinus problems before?\" If Yes, ask: \"When was the last time?\" and \"What happened that time?\"       Oh yes  5. NASAL CONGESTION: \"Is the nose blocked?\" If Yes, ask: \"Can you open it or must you breathe through your mouth?\"      I think it is. I blow and I rarely get anything out. I cannot breath with my mouth closed.  6. NASAL DISCHARGE: \"Do you have discharge from your nose?\" If so ask, \"What color?\"      Very little, it's been green the last three days  7. FEVER: \"Do you have a fever?\" If Yes, ask: \"What is it, how was it measured, and when did it start?\"       No  8. OTHER SYMPTOMS: \"Do you have any other symptoms?\" (e.g., sore throat, cough, earache, difficulty breathing)      Cough once in awhile.  9. PREGNANCY: \"Is there any chance you are pregnant?\" \"When was your last menstrual period?\"      No    Protocols used: SINUS PAIN OR CONGESTION-A-OH    "

## 2023-05-10 ENCOUNTER — TELEPHONE (OUTPATIENT)
Dept: FAMILY MEDICINE | Facility: CLINIC | Age: 83
End: 2023-05-10
Payer: COMMERCIAL

## 2023-05-10 DIAGNOSIS — J01.90 ACUTE SINUSITIS WITH SYMPTOMS > 10 DAYS: ICD-10-CM

## 2023-05-10 RX ORDER — AMOXICILLIN 500 MG/1
500 CAPSULE ORAL 3 TIMES DAILY
Qty: 30 CAPSULE | Refills: 0 | Status: SHIPPED | OUTPATIENT
Start: 2023-05-10 | End: 2023-09-28

## 2023-06-03 ENCOUNTER — MYC MEDICAL ADVICE (OUTPATIENT)
Dept: FAMILY MEDICINE | Facility: CLINIC | Age: 83
End: 2023-06-03
Payer: COMMERCIAL

## 2023-06-03 DIAGNOSIS — Z78.0 MENOPAUSE: ICD-10-CM

## 2023-06-05 DIAGNOSIS — Z78.0 MENOPAUSE: ICD-10-CM

## 2023-06-30 ENCOUNTER — TELEPHONE (OUTPATIENT)
Dept: FAMILY MEDICINE | Facility: CLINIC | Age: 83
End: 2023-06-30
Payer: COMMERCIAL

## 2023-06-30 NOTE — TELEPHONE ENCOUNTER
To PCP    Pharmacist from [facility] stating that patients plan covers premarin at their pharmacy and it was supposed to be sent to there pharmacy. Writer gave VO for rx to correct pharmacy.     Gala De La Rosa RN on 6/30/2023 at 10:12 AM

## 2023-09-12 DIAGNOSIS — Z78.0 MENOPAUSE: ICD-10-CM

## 2023-09-12 NOTE — TELEPHONE ENCOUNTER
Change in pharmacy to mail order, 90 day supply requested    LOV 11- Pamella  No future OV scheduled    Pharm note given - AWE due December 2023    Elvia Castellanos RT (R)

## 2023-09-25 DIAGNOSIS — I10 ESSENTIAL HYPERTENSION, BENIGN: Chronic | ICD-10-CM

## 2023-09-25 RX ORDER — METOPROLOL SUCCINATE 50 MG/1
50 TABLET, EXTENDED RELEASE ORAL DAILY
Qty: 90 TABLET | Refills: 3 | OUTPATIENT
Start: 2023-09-25

## 2023-09-28 ENCOUNTER — VIRTUAL VISIT (OUTPATIENT)
Dept: FAMILY MEDICINE | Facility: CLINIC | Age: 83
End: 2023-09-28
Payer: COMMERCIAL

## 2023-09-28 DIAGNOSIS — I10 ESSENTIAL HYPERTENSION, BENIGN: Chronic | ICD-10-CM

## 2023-09-28 DIAGNOSIS — E78.5 HYPERLIPIDEMIA, UNSPECIFIED HYPERLIPIDEMIA TYPE: ICD-10-CM

## 2023-09-28 DIAGNOSIS — M81.0 SENILE OSTEOPOROSIS: ICD-10-CM

## 2023-09-28 DIAGNOSIS — Z76.0 ENCOUNTER FOR MEDICATION REFILL: Primary | ICD-10-CM

## 2023-09-28 PROCEDURE — 99443 PR PHYSICIAN TELEPHONE EVALUATION 21-30 MIN: CPT | Mod: 93 | Performed by: INTERNAL MEDICINE

## 2023-09-28 RX ORDER — METOPROLOL SUCCINATE 50 MG/1
50 TABLET, EXTENDED RELEASE ORAL DAILY
Qty: 90 TABLET | Refills: 3 | Status: SHIPPED | OUTPATIENT
Start: 2023-09-28 | End: 2024-02-09

## 2023-09-28 RX ORDER — ATORVASTATIN CALCIUM 20 MG/1
20 TABLET, FILM COATED ORAL DAILY
Qty: 90 TABLET | Refills: 3 | Status: SHIPPED | OUTPATIENT
Start: 2023-09-28 | End: 2024-02-09

## 2023-09-28 NOTE — PROGRESS NOTES
"Paris is a 82 year old who is being evaluated via a billable telephone visit.      What phone number would you like to be contacted at? [phone]  How would you like to obtain your AVS? Ambrocio    Distant Location (provider location):  On-site      Subjective   Paris is a 82 year old, presenting for the following health issues:  Follow Up    HPI       Chief Complaint:     Medication refills    HPI:   Patient Ashanti Jarvis is a very pleasant 82 year old female with history of hypertension, hyperlipidemia who presents to Internal Medicine clinic today via telephone visit for follow up of multiple concerns including medication refills.        Current Medications:     Current Outpatient Medications   Medication Sig Dispense Refill    amLODIPine (NORVASC) 5 MG tablet Take 1 tablet (5 mg) by mouth daily 90 tablet 1    atorvastatin (LIPITOR) 20 MG tablet Take 1 tablet (20 mg) by mouth daily 90 tablet 3    cetirizine (ZYRTEC) 10 MG tablet Take 1 tablet (10 mg) by mouth daily 30 tablet 1    cevimeline (EVOXAC) 30 MG capsule Take 30 mg by mouth 3 times daily       dexamethasone 0.5 MG/5ML ELIX Swish and spit in mouth daily as needed (2 weeks at a time)       estrogen conj (PREMARIN) 0.625 MG tablet Take 1 tablet (0.625 mg) by mouth daily 30 tablet 0    leflunomide (ARAVA) 20 MG tablet Take 1 tablet by mouth daily      metoprolol succinate ER (TOPROL XL) 50 MG 24 hr tablet Take 1 tablet (50 mg) by mouth daily 90 tablet 3    multivitamin w/minerals (THERA-VIT-M) tablet Take 1 tablet by mouth daily 100 tablet 3    Probiotic Product (PROBIOTIC FORMULA PO) Take by mouth daily      UNABLE TO FIND MEDICATION NAME: Rhodus mouthwash swish and spit daily as needed      Vitamin D, Cholecalciferol, 1000 UNITS CAPS Take 1 capsule by mouth daily      estrogen conj (PREMARIN) 0.625 MG tablet Take 1 tablet (0.625 mg) by mouth daily 90 tablet 0         Allergies:      Allergies   Allergen Reactions    Vancomycin Rash     \"like lizard " "skin all over my body\"            Past Medical History:     Past Medical History:   Diagnosis Date    Benign neoplasm of colon 5/01    tubular adenoma; one non-adenomatous polyp 2012 - rec f/u 5 years    Essential hypertension, benign     Essential tremor     Hx estrogen therapy     Since hysterectomy (estimates ~30 years as of Sept 2014)    Intracranial abscess Dec 2012    H/o sinusitis and intracranial abscess    Lichen planus     Migraine, unspecified, without mention of intractable migraine without mention of status migrainosus 1963    Osteoporosis     Reclast 10/11 --> drug holiday 2015 per rheum    Other isolated or specific phobias     Claustrophobia    Other kyphoscoliosis and scoliosis     Pemphigoid, benign mucous membrane     Personal history of urinary calculi     Rheumatoid arthritis(714.0) 1991    Followed by Dr Vandana Merino at [facility]    Sjogren's syndrome (H)     due to meds    Unspecified tinnitus     Left ear    Urge urinary incontinence          Past Surgical History:     Past Surgical History:   Procedure Laterality Date    APPENDECTOMY      COLONOSCOPY N/A 5/25/2017    Procedure: COLONOSCOPY;  colonoscopy;  Surgeon: Alan Crenshaw MD;  Location:  GI    ENDOSCOPIC BALLOON SINUPLASTY ACCLARENT  12/11/2012    Procedure: ENDOSCOPIC BALLOON SINUPLASTY ACCLARENT;  JOSHUA. MAXILLO SINUS SURGERY,  ;  Surgeon: David Ortega MD;  Location:  OR    HYSTERECTOMY, VAGINAL  1983    has ovaries; secondary to fibroids    KERATOTOMY ARCUATE WITH FEMTOSECOND LASER/IMAGING FOR ATIOL Right 1/4/2016    Procedure: KERATOTOMY ARCUATE WITH FEMTOSECOND LASER/IMAGING FOR ATIOL;  Surgeon: Larry Vilchis MD;  Location:  EC    OPTICAL TRACKING SYSTEM CRANIOTOMY, EXCISE TUMOR, COMBINED  12/12/2012    Procedure: COMBINED OPTICAL TRACKING SYSTEM CRANIOTOMY, EXCISE TUMOR;  LEFT FRONTAL CRANIOTOMY, IMAGE GUIDANCE FROM FERMIN PALENCIA. - SUPINE, CLAUS MABRY;  Surgeon: Tomi Fernandez " MD Hieu;  Location:  OR    PHACOEMULSIFICATION CLEAR CORNEA WITH STANDARD INTRAOCULAR LENS IMPLANT Left 2015    Procedure: PHACOEMULSIFICATION CLEAR CORNEA WITH STANDARD INTRAOCULAR LENS IMPLANT;  Surgeon: Larry Vilchis MD;  Location:  EC    PHACOEMULSIFICATION CLEAR CORNEA WITH STANDARD INTRAOCULAR LENS IMPLANT Right 2016    Procedure: PHACOEMULSIFICATION CLEAR CORNEA WITH STANDARD INTRAOCULAR LENS IMPLANT;  Surgeon: Larry Vilchis MD;  Location:  EC    ZZC NONSPECIFIC PROCEDURE  1987    pneumonia         Family Medical History:     Family History   Problem Relation Age of Onset    Osteoporosis Mother     Cancer Father         Lung CA          Social History:     Social History     Socioeconomic History    Marital status:      Spouse name: Not on file    Number of children: 2    Years of education: Not on file    Highest education level: Not on file   Occupational History    Not on file   Tobacco Use    Smoking status: Former     Packs/day: 0.10     Years: 1.00     Pack years: 0.10     Types: Cigarettes     Quit date: 1962     Years since quittin.7    Smokeless tobacco: Never    Tobacco comments:     tried in college   Substance and Sexual Activity    Alcohol use: Yes     Alcohol/week: 3.0 standard drinks of alcohol     Types: 3 Standard drinks or equivalent per week     Comment:  abt 2 glasses wine per week     Drug use: No    Sexual activity: Not Currently     Partners: Male   Other Topics Concern    Parent/sibling w/ CABG, MI or angioplasty before 65F 55M? Not Asked   Social History Narrative    Not on file     Social Determinants of Health     Financial Resource Strain: Not on file   Food Insecurity: Not on file   Transportation Needs: Not on file   Physical Activity: Not on file   Stress: Not on file   Social Connections: Not on file   Interpersonal Safety: Low Risk  (2023)    Interpersonal Safety     Do you feel physically and emotionally safe where you  currently live?: Yes     Within the past 12 months, have you been hit, slapped, kicked or otherwise physically hurt by someone?: No     Within the past 12 months, have you been humiliated or emotionally abused in other ways by your partner or ex-partner?: No   Housing Stability: Not on file           Review of System:     Constitutional: Negative for fever or chills  Skin: Negative for rashes  Ears/Nose/Throat: Negative for nasal congestion, sore throat  Respiratory: No shortness of breath, dyspnea on exertion, cough, or hemoptysis  Cardiovascular: Negative for chest pain  Gastrointestinal: Negative for nausea, vomiting  Genitourinary: Negative for dysuria, hematuria  Musculoskeletal: Negative for myalgias  Neurologic: Negative for headaches  Psychiatric: Negative for depression, anxiety  Hematologic/Lymphatic/Immunologic: Negative  Endocrine: positive for osteoporosis  Behavioral: Negative for tobacco use       Physical Exam:   LMP  (LMP Unknown)   Breastfeeding No       RESP: no cough present   NEURO: Alert & Oriented x 3.   PSYCH: mentation appears normal, affect normal        Diagnostic Test Results:     Diagnostic Test Results:  Labs reviewed in Epic    ASSESSMENT/PLAN:       Ashanti was seen today for follow up.    Diagnoses and all orders for this visit:    Encounter for medication refill  Senile osteoporosis  - PREMARIN medication refilled today    Essential hypertension, benign  -     metoprolol succinate ER (TOPROL XL) 50 MG 24 hr tablet; Take 1 tablet (50 mg) by mouth daily    Hyperlipidemia, unspecified hyperlipidemia type  -     atorvastatin (LIPITOR) 20 MG tablet; Take 1 tablet (20 mg) by mouth daily            Follow Up Plan:     Patient is instructed to return to Internal Medicine clinic for follow-up visit in 3 months.        Chantale Can MD  Internal Medicine  [facility]      telephone visit duration including chart review medication management: 32 minutes

## 2023-10-31 ENCOUNTER — PATIENT OUTREACH (OUTPATIENT)
Dept: CARE COORDINATION | Facility: CLINIC | Age: 83
End: 2023-10-31
Payer: COMMERCIAL

## 2023-11-14 ENCOUNTER — PATIENT OUTREACH (OUTPATIENT)
Dept: CARE COORDINATION | Facility: CLINIC | Age: 83
End: 2023-11-14
Payer: COMMERCIAL

## 2023-11-28 ENCOUNTER — PATIENT OUTREACH (OUTPATIENT)
Dept: CARE COORDINATION | Facility: CLINIC | Age: 83
End: 2023-11-28
Payer: COMMERCIAL

## 2023-12-16 DIAGNOSIS — M81.0 SENILE OSTEOPOROSIS: Primary | ICD-10-CM

## 2023-12-16 DIAGNOSIS — Z76.0 ENCOUNTER FOR MEDICATION REFILL: ICD-10-CM

## 2023-12-18 RX ORDER — CONJUGATED ESTROGENS 0.62 MG/1
0.62 TABLET, FILM COATED ORAL DAILY
Qty: 30 TABLET | Refills: 0 | Status: SHIPPED | OUTPATIENT
Start: 2023-12-18 | End: 2024-01-29

## 2023-12-26 ENCOUNTER — PATIENT OUTREACH (OUTPATIENT)
Dept: CARE COORDINATION | Facility: CLINIC | Age: 83
End: 2023-12-26
Payer: COMMERCIAL

## 2024-01-13 ENCOUNTER — HEALTH MAINTENANCE LETTER (OUTPATIENT)
Age: 84
End: 2024-01-13

## 2024-01-29 DIAGNOSIS — Z76.0 ENCOUNTER FOR MEDICATION REFILL: ICD-10-CM

## 2024-01-29 DIAGNOSIS — M81.0 SENILE OSTEOPOROSIS: ICD-10-CM

## 2024-02-08 DIAGNOSIS — M81.0 SENILE OSTEOPOROSIS: ICD-10-CM

## 2024-02-08 DIAGNOSIS — Z76.0 ENCOUNTER FOR MEDICATION REFILL: ICD-10-CM

## 2024-02-08 NOTE — TELEPHONE ENCOUNTER
Please remove the other two RX for Premarin.       Three premarin medications listed.     Carine Hodgson RN  [facility]

## 2024-02-09 ENCOUNTER — VIRTUAL VISIT (OUTPATIENT)
Dept: FAMILY MEDICINE | Facility: CLINIC | Age: 84
End: 2024-02-09
Payer: COMMERCIAL

## 2024-02-09 DIAGNOSIS — M81.0 SENILE OSTEOPOROSIS: Primary | ICD-10-CM

## 2024-02-09 DIAGNOSIS — Z12.31 VISIT FOR SCREENING MAMMOGRAM: ICD-10-CM

## 2024-02-09 DIAGNOSIS — I10 ESSENTIAL HYPERTENSION, BENIGN: Chronic | ICD-10-CM

## 2024-02-09 DIAGNOSIS — E78.5 HYPERLIPIDEMIA, UNSPECIFIED HYPERLIPIDEMIA TYPE: ICD-10-CM

## 2024-02-09 PROCEDURE — 99443 PR PHYSICIAN TELEPHONE EVALUATION 21-30 MIN: CPT | Mod: 93 | Performed by: INTERNAL MEDICINE

## 2024-02-09 RX ORDER — ATORVASTATIN CALCIUM 20 MG/1
20 TABLET, FILM COATED ORAL DAILY
Qty: 90 TABLET | Refills: 3 | Status: SHIPPED | OUTPATIENT
Start: 2024-02-09

## 2024-02-09 RX ORDER — ESTRADIOL 0.5 MG/1
0.5 TABLET ORAL DAILY
Qty: 90 TABLET | Refills: 3 | Status: SHIPPED | OUTPATIENT
Start: 2024-02-09

## 2024-02-09 RX ORDER — METOPROLOL SUCCINATE 50 MG/1
50 TABLET, EXTENDED RELEASE ORAL DAILY
Qty: 90 TABLET | Refills: 3 | Status: SHIPPED | OUTPATIENT
Start: 2024-02-09

## 2024-02-09 RX ORDER — AMLODIPINE BESYLATE 5 MG/1
5 TABLET ORAL DAILY
Qty: 90 TABLET | Refills: 3 | Status: SHIPPED | OUTPATIENT
Start: 2024-02-09

## 2024-02-09 NOTE — PROGRESS NOTES
Paris is a 83 year old who is being evaluated via a billable telephone visit.      What phone number would you like to be contacted at? [phone]  How would you like to obtain your AVS? Ambrocio    Distant Location (provider location):  On-site      Subjective   Paris is a 82 year old, presenting for the following health issues:  Recheck Medication (Estrogen conj/premarin no longer covered by pt ins)    History of Present Illness             Chief Complaint:     Medication refills    HPI:   Patient Ashanti Jarvis is a very pleasant 83 year old female with history of hypertension, hyperlipidemia who presents to Internal Medicine clinic today via telephone visit for follow up of multiple concerns including medication refills.        Current Medications:     Current Outpatient Medications   Medication Sig Dispense Refill    amLODIPine (NORVASC) 5 MG tablet Take 1 tablet (5 mg) by mouth daily 90 tablet 3    atorvastatin (LIPITOR) 20 MG tablet Take 1 tablet (20 mg) by mouth daily 90 tablet 3    estradiol (ESTRACE) 0.5 MG tablet Take 1 tablet (0.5 mg) by mouth daily 90 tablet 3    metoprolol succinate ER (TOPROL XL) 50 MG 24 hr tablet Take 1 tablet (50 mg) by mouth daily 90 tablet 3    cetirizine (ZYRTEC) 10 MG tablet Take 1 tablet (10 mg) by mouth daily 30 tablet 1    cevimeline (EVOXAC) 30 MG capsule Take 30 mg by mouth 3 times daily       leflunomide (ARAVA) 20 MG tablet Take 1 tablet by mouth daily      multivitamin w/minerals (THERA-VIT-M) tablet Take 1 tablet by mouth daily 100 tablet 3    Probiotic Product (PROBIOTIC FORMULA PO) Take by mouth daily           Allergies:      Allergies   Allergen Reactions    Vancomycin             Past Medical History:     Past Medical History:   Diagnosis Date    Benign neoplasm of colon 5/01    tubular adenoma; one non-adenomatous polyp 2012 - rec f/u 5 years    Essential hypertension, benign     Essential tremor     Hx estrogen therapy     Since hysterectomy (estimates ~30  years as of Sept 2014)    Intracranial abscess Dec 2012    H/o sinusitis and intracranial abscess    Lichen planus     Migraine, unspecified, without mention of intractable migraine without mention of status migrainosus 1963    Osteoporosis     Reclast 10/11 --> drug holiday 2015 per rheum    Other isolated or specific phobias     Claustrophobia    Other kyphoscoliosis and scoliosis     Pemphigoid, benign mucous membrane (H28)     Personal history of urinary calculi     Rheumatoid arthritis(714.0) 1991    Followed by Dr Vandana Merino at [facility]    Sjogren's syndrome (H24)     due to meds    Unspecified tinnitus     Left ear    Urge urinary incontinence          Past Surgical History:     Past Surgical History:   Procedure Laterality Date    APPENDECTOMY      COLONOSCOPY N/A 5/25/2017    Procedure: COLONOSCOPY;  colonoscopy;  Surgeon: Alan Crenshaw MD;  Location:  GI    ENDOSCOPIC BALLOON SINUPLASTY ACCLARENT  12/11/2012    Procedure: ENDOSCOPIC BALLOON SINUPLASTY ACCLARENT;  JOSHUA. MAXILLO SINUS SURGERY,  ;  Surgeon: David Ortega MD;  Location:  OR    HYSTERECTOMY, VAGINAL  1983    has ovaries; secondary to fibroids    KERATOTOMY ARCUATE WITH FEMTOSECOND LASER/IMAGING FOR ATIOL Right 1/4/2016    Procedure: KERATOTOMY ARCUATE WITH FEMTOSECOND LASER/IMAGING FOR ATIOL;  Surgeon: Larry Vilchis MD;  Location:  EC    OPTICAL TRACKING SYSTEM CRANIOTOMY, EXCISE TUMOR, COMBINED  12/12/2012    Procedure: COMBINED OPTICAL TRACKING SYSTEM CRANIOTOMY, EXCISE TUMOR;  LEFT FRONTAL CRANIOTOMY, IMAGE GUIDANCE FROM FERMIN PALENCIA. - SUPINE, CLAUS MABRY;  Surgeon: Tomi Fernandez MD;  Location:  OR    PHACOEMULSIFICATION CLEAR CORNEA WITH STANDARD INTRAOCULAR LENS IMPLANT Left 12/21/2015    Procedure: PHACOEMULSIFICATION CLEAR CORNEA WITH STANDARD INTRAOCULAR LENS IMPLANT;  Surgeon: Larry Vilchis MD;  Location:  EC    PHACOEMULSIFICATION CLEAR CORNEA WITH STANDARD  INTRAOCULAR LENS IMPLANT Right 2016    Procedure: PHACOEMULSIFICATION CLEAR CORNEA WITH STANDARD INTRAOCULAR LENS IMPLANT;  Surgeon: Larry Vilchis MD;  Location: [facility] NONSPECIFIC PROCEDURE  1987    pneumonia         Family Medical History:     Family History   Problem Relation Age of Onset    Osteoporosis Mother     Cancer Father         Lung CA          Social History:     Social History     Socioeconomic History    Marital status:      Spouse name: Not on file    Number of children: 2    Years of education: Not on file    Highest education level: Not on file   Occupational History    Not on file   Tobacco Use    Smoking status: Former     Packs/day: 0.10     Years: 1.00     Additional pack years: 0.00     Total pack years: 0.10     Types: Cigarettes     Quit date: 1962     Years since quittin.1    Smokeless tobacco: Never    Tobacco comments:     tried in college   Substance and Sexual Activity    Alcohol use: Yes     Alcohol/week: 3.0 standard drinks of alcohol     Types: 3 Standard drinks or equivalent per week     Comment:  abt 2 glasses wine per week     Drug use: No    Sexual activity: Not Currently     Partners: Male   Other Topics Concern    Parent/sibling w/ CABG, MI or angioplasty before 65F 55M? Not Asked   Social History Narrative    Not on file     Social Determinants of Health     Financial Resource Strain: Not on file   Food Insecurity: Not on file   Transportation Needs: Not on file   Physical Activity: Not on file   Stress: Not on file   Social Connections: Not on file   Interpersonal Safety: Low Risk  (2024)    Interpersonal Safety     Do you feel physically and emotionally safe where you currently live?: Yes     Within the past 12 months, have you been hit, slapped, kicked or otherwise physically hurt by someone?: No     Within the past 12 months, have you been humiliated or emotionally abused in other ways by your partner or ex-partner?: No   Housing  Stability: Not on file           Review of System:     Constitutional: Negative for fever or chills  Skin: Negative for rashes  Ears/Nose/Throat: Negative for nasal congestion, sore throat  Respiratory: No shortness of breath, dyspnea on exertion, cough, or hemoptysis  Cardiovascular: Negative for chest pain  Gastrointestinal: Negative for nausea, vomiting  Genitourinary: Negative for dysuria, hematuria  Musculoskeletal: Negative for myalgias  Neurologic: Negative for headaches  Psychiatric: Negative for depression, anxiety  Hematologic/Lymphatic/Immunologic: Negative  Endocrine: positive for osteoporosis  Behavioral: Negative for tobacco use       Physical Exam:   LMP  (LMP Unknown)       RESP: no cough present   NEURO: Alert & Oriented x 3.   PSYCH: mentation appears normal, affect normal        Diagnostic Test Results:     Diagnostic Test Results:  Labs reviewed in Epic    ASSESSMENT/PLAN:       Ashanti was seen today for follow up.    Diagnoses and all orders for this visit:      Senile osteoporosis  - stable, no recent falls or acute fractures  - estradiol medication refilled today    Essential hypertension, benign  - stable, medication refilled for    metoprolol succinate ER (TOPROL XL) 50 MG 24 hr tablet; Take 1 tablet (50 mg) by mouth daily    Hyperlipidemia, unspecified hyperlipidemia type  - stable, medication refilled for    atorvastatin (LIPITOR) 20 MG tablet; Take 1 tablet (20 mg) by mouth daily            Follow Up Plan:     Patient is instructed to return to Internal Medicine clinic for follow-up visit in 6 to 12 months.        Chantale Can MD  Internal Medicine  [facility]      telephone visit duration including chart review medication management: 31 minutes

## 2024-03-20 ENCOUNTER — HOSPITAL ENCOUNTER (OUTPATIENT)
Dept: MAMMOGRAPHY | Facility: CLINIC | Age: 84
Discharge: HOME | End: 2024-03-20
Attending: INTERNAL MEDICINE | Admitting: INTERNAL MEDICINE
Payer: COMMERCIAL

## 2024-03-20 DIAGNOSIS — Z12.31 VISIT FOR SCREENING MAMMOGRAM: ICD-10-CM

## 2024-03-20 PROCEDURE — 77063 BREAST TOMOSYNTHESIS BI: CPT

## 2024-03-27 ENCOUNTER — TRANSFERRED RECORDS (OUTPATIENT)
Dept: HEALTH INFORMATION MANAGEMENT | Facility: CLINIC | Age: 84
End: 2024-03-27
Payer: COMMERCIAL

## 2024-03-27 LAB
ALT SERPL-CCNC: 15 IU/L (ref 5–35)
AST SERPL-CCNC: 25 U/L (ref 5–34)
CREATININE (EXTERNAL): 0.62 MG/DL (ref 0.5–1.3)

## 2024-05-23 ENCOUNTER — TRANSFERRED RECORDS (OUTPATIENT)
Dept: HEALTH INFORMATION MANAGEMENT | Facility: CLINIC | Age: 84
End: 2024-05-23
Payer: COMMERCIAL

## 2024-05-28 ENCOUNTER — PATIENT OUTREACH (OUTPATIENT)
Dept: CARE COORDINATION | Facility: CLINIC | Age: 84
End: 2024-05-28
Payer: COMMERCIAL

## 2024-08-05 ENCOUNTER — TELEPHONE (OUTPATIENT)
Dept: FAMILY MEDICINE | Facility: CLINIC | Age: 84
End: 2024-08-05
Payer: COMMERCIAL

## 2024-08-05 NOTE — TELEPHONE ENCOUNTER
Spoke with pt, she state that she have this pain for months. She is not able to come this week to be seen but she scheduled an appointment on 08/13/2024.

## 2024-08-05 NOTE — TELEPHONE ENCOUNTER
Order/Referral Request    Who is requesting: Pt is having MS side pain    Orders being requested: MRI or xray    Reason service is needed/diagnosis: Pt is having pain is side, under left breast and pulls around to the back. After sitting, sometimes pt has problems standing up. Pt would like to know if provider can order an MRI or if he would like her to be seen, the soonest was Oct 1.    When are orders needed by: ASAP    Has this been discussed with Provider: No    Does patient have a preference on a Group/Provider/Facility? Closest loction    Does patient have an appointment scheduled?: No    Where to send orders: Place orders within Epic    Could we send this information to you in [place] or would you prefer to receive a phone call?:   Patient would prefer a phone call   Okay to leave a detailed message?: Yes at Home number on file [phone] (home)

## 2024-08-13 ENCOUNTER — OFFICE VISIT (OUTPATIENT)
Dept: FAMILY MEDICINE | Facility: CLINIC | Age: 84
End: 2024-08-13
Payer: COMMERCIAL

## 2024-08-13 VITALS
OXYGEN SATURATION: 96 % | RESPIRATION RATE: 16 BRPM | WEIGHT: 137 LBS | BODY MASS INDEX: 25.21 KG/M2 | TEMPERATURE: 97.6 F | SYSTOLIC BLOOD PRESSURE: 138 MMHG | HEART RATE: 70 BPM | HEIGHT: 62 IN | DIASTOLIC BLOOD PRESSURE: 72 MMHG

## 2024-08-13 DIAGNOSIS — G89.29 CHRONIC LEFT-SIDED THORACIC BACK PAIN: Primary | ICD-10-CM

## 2024-08-13 DIAGNOSIS — M54.6 CHRONIC LEFT-SIDED THORACIC BACK PAIN: Primary | ICD-10-CM

## 2024-08-13 DIAGNOSIS — M41.85 OTHER FORMS OF SCOLIOSIS, THORACOLUMBAR REGION: ICD-10-CM

## 2024-08-13 DIAGNOSIS — M06.9 RHEUMATOID ARTHRITIS, INVOLVING UNSPECIFIED SITE, UNSPECIFIED WHETHER RHEUMATOID FACTOR PRESENT (H): Chronic | ICD-10-CM

## 2024-08-13 DIAGNOSIS — M81.0 SENILE OSTEOPOROSIS: Chronic | ICD-10-CM

## 2024-08-13 PROCEDURE — 99214 OFFICE O/P EST MOD 30 MIN: CPT | Performed by: PHYSICIAN ASSISTANT

## 2024-08-13 ASSESSMENT — PAIN SCALES - GENERAL: PAINLEVEL: NO PAIN (0)

## 2024-08-13 NOTE — PROGRESS NOTES
"HPI: Pat is a very pleasant 82 yo female here with her dtr.   She has RA and scoliosis here with L lower side pain x \"years\"  Pain comes and goes  No pain with sitting or when lying down  Standing is worse than walking but both make pain worse  Dr. Frye is her rheumatologist and pt has not had xrays  She is having pain  Has lost 3 inches in height  She did see Dr. Rosas at [facility] who referred her to Sp (2021)  She does have soft brace that helps but she is not compliant with wearing it  She did just buy a wheeled walker with a seat  She doesn't take any tylenol , but may take ibuprofen once per week    Past Medical History:   Diagnosis Date    Benign neoplasm of colon 5/01    tubular adenoma; one non-adenomatous polyp 2012 - rec f/u 5 years    Essential hypertension, benign     Essential tremor     Hx estrogen therapy     Since hysterectomy (estimates ~30 years as of Sept 2014)    Intracranial abscess Dec 2012    H/o sinusitis and intracranial abscess    Lichen planus     Migraine, unspecified, without mention of intractable migraine without mention of status migrainosus 1963    Osteoporosis     Reclast 10/11 --> drug holiday 2015 per rheum    Other isolated or specific phobias     Claustrophobia    Other kyphoscoliosis and scoliosis     Pemphigoid, benign mucous membrane (H28)     Personal history of urinary calculi     Rheumatoid arthritis(714.0) 1991    Followed by Dr Vandana Merino at [facility]    Sjogren's syndrome (H24)     due to meds    Unspecified tinnitus     Left ear    Urge urinary incontinence      Past Surgical History:   Procedure Laterality Date    APPENDECTOMY      COLONOSCOPY N/A 5/25/2017    Procedure: COLONOSCOPY;  colonoscopy;  Surgeon: Alan Crenshaw MD;  Location:  GI    ENDOSCOPIC BALLOON SINUPLASTY ACCLARENT  12/11/2012    Procedure: ENDOSCOPIC BALLOON SINUPLASTY ACCLARENT;  JOSHUA. MAXILLO SINUS SURGERY,  ;  Surgeon: David Ortega MD;  Location:  OR    " HYSTERECTOMY, VAGINAL  1983    has ovaries; secondary to fibroids    KERATOTOMY ARCUATE WITH FEMTOSECOND LASER/IMAGING FOR ATIOL Right 2016    Procedure: KERATOTOMY ARCUATE WITH FEMTOSECOND LASER/IMAGING FOR ATIOL;  Surgeon: Larry Vilchis MD;  Location:  EC    OPTICAL TRACKING SYSTEM CRANIOTOMY, EXCISE TUMOR, COMBINED  2012    Procedure: COMBINED OPTICAL TRACKING SYSTEM CRANIOTOMY, EXCISE TUMOR;  LEFT FRONTAL CRANIOTOMY, IMAGE GUIDANCE FROM FERMIN PALENCIA. - SUPINE, CLAUS MABRY;  Surgeon: Tomi Fernandez MD;  Location:  OR    PHACOEMULSIFICATION CLEAR CORNEA WITH STANDARD INTRAOCULAR LENS IMPLANT Left 2015    Procedure: PHACOEMULSIFICATION CLEAR CORNEA WITH STANDARD INTRAOCULAR LENS IMPLANT;  Surgeon: Larry Vilchis MD;  Location:  EC    PHACOEMULSIFICATION CLEAR CORNEA WITH STANDARD INTRAOCULAR LENS IMPLANT Right 2016    Procedure: PHACOEMULSIFICATION CLEAR CORNEA WITH STANDARD INTRAOCULAR LENS IMPLANT;  Surgeon: Larry Vilchis MD;  Location:  EC    ZZC NONSPECIFIC PROCEDURE  1987    pneumonia     Social History     Tobacco Use    Smoking status: Former     Current packs/day: 0.00     Average packs/day: 0.1 packs/day for 1 year (0.1 ttl pk-yrs)     Types: Cigarettes     Start date: 1961     Quit date: 1962     Years since quittin.6    Smokeless tobacco: Never    Tobacco comments:     tried in college   Substance Use Topics    Alcohol use: Yes     Alcohol/week: 3.0 standard drinks of alcohol     Types: 3 Standard drinks or equivalent per week     Comment:  abt 2 glasses wine per week      Current Outpatient Medications   Medication Sig Dispense Refill    amLODIPine (NORVASC) 5 MG tablet Take 1 tablet (5 mg) by mouth daily 90 tablet 3    atorvastatin (LIPITOR) 20 MG tablet Take 1 tablet (20 mg) by mouth daily 90 tablet 3    CALCIUM-MAGNESIUM-ZINC PO Take 3 tablets by mouth daily      cetirizine (ZYRTEC) 10 MG tablet Take 1 tablet (10 mg) by mouth  "daily 30 tablet 1    cevimeline (EVOXAC) 30 MG capsule Take 30 mg by mouth 3 times daily       estradiol (ESTRACE) 0.5 MG tablet Take 1 tablet (0.5 mg) by mouth daily 90 tablet 3    leflunomide (ARAVA) 20 MG tablet Take 1 tablet by mouth daily      metoprolol succinate ER (TOPROL XL) 50 MG 24 hr tablet Take 1 tablet (50 mg) by mouth daily 90 tablet 3    multivitamin w/minerals (THERA-VIT-M) tablet Take 1 tablet by mouth daily 100 tablet 3    Probiotic Product (PROBIOTIC FORMULA PO) Take by mouth daily       Allergies   Allergen Reactions    Vancomycin      PHYSICAL EXAM:    /72   Pulse 70   Temp 97.6  F (36.4  C)   Resp 16   Ht 1.569 m (5' 1.77\")   Wt 62.1 kg (137 lb)   LMP  (LMP Unknown)   SpO2 96%   BMI 25.24 kg/m      Patient appears non toxic  Spine: very obvious curvature due to scoliosis  Tender to palp L lower lateral ribcage where her ribs meet her hip    Assessment and Plan:     (M54.6,  G89.29) Chronic left-sided thoracic back pain  (primary encounter diagnosis)  Comment:   Plan:   Patient Instructions   For your side pain start wearing her brace during the day as much as you can for support  Use your wheeled walker  Take tylenol 1000mg once or twice per day especially if you will be walking  If you are not improving, you can follow up with Dr. Rosas at [facility]      (M41.85) Other forms of scoliosis, thoracolumbar region  Comment:   Plan: Dr. Rosas did not recommend surgery for her sig scoliosis.      (M06.9) Rheumatoid arthritis, involving unspecified site, unspecified whether rheumatoid factor present (H)  Comment:   Plan: followed by Dr. Frye    (M81.0) Osteoporosis  Comment:   Plan: pt wanted to see a chiropractor but advised against adjustments given her osteoporosis and age.      Lorna Renteria PA-C          "

## 2024-08-13 NOTE — PATIENT INSTRUCTIONS
For your side pain start wearing her brace during the day as much as you can for support  Use your wheeled walker  Take tylenol 1000mg once or twice per day especially if you will be walking  If you are not improving, you can follow up with Dr. Rosas at [facility]

## 2024-10-10 ENCOUNTER — TRANSFERRED RECORDS (OUTPATIENT)
Dept: HEALTH INFORMATION MANAGEMENT | Facility: CLINIC | Age: 84
End: 2024-10-10
Payer: COMMERCIAL

## 2025-01-20 DIAGNOSIS — I10 ESSENTIAL HYPERTENSION, BENIGN: Chronic | ICD-10-CM

## 2025-01-20 DIAGNOSIS — E78.5 HYPERLIPIDEMIA, UNSPECIFIED HYPERLIPIDEMIA TYPE: ICD-10-CM

## 2025-01-20 DIAGNOSIS — M81.0 SENILE OSTEOPOROSIS: ICD-10-CM

## 2025-01-20 RX ORDER — ESTRADIOL 0.5 MG/1
0.5 TABLET ORAL DAILY
Qty: 90 TABLET | Refills: 0 | Status: SHIPPED | OUTPATIENT
Start: 2025-01-20

## 2025-01-26 ENCOUNTER — HEALTH MAINTENANCE LETTER (OUTPATIENT)
Age: 85
End: 2025-01-26

## 2025-01-27 RX ORDER — ATORVASTATIN CALCIUM 20 MG
20 TABLET ORAL DAILY
Qty: 90 TABLET | Refills: 1 | Status: SHIPPED | OUTPATIENT
Start: 2025-01-27

## 2025-01-27 RX ORDER — AMLODIPINE BESYLATE 5 MG
5 TABLET ORAL DAILY
Qty: 90 TABLET | Refills: 1 | Status: SHIPPED | OUTPATIENT
Start: 2025-01-27

## 2025-02-19 ENCOUNTER — TRANSFERRED RECORDS (OUTPATIENT)
Dept: HEALTH INFORMATION MANAGEMENT | Facility: CLINIC | Age: 85
End: 2025-02-19
Payer: COMMERCIAL

## 2025-04-06 ENCOUNTER — MYC REFILL (OUTPATIENT)
Dept: FAMILY MEDICINE | Facility: CLINIC | Age: 85
End: 2025-04-06
Payer: COMMERCIAL

## 2025-04-06 DIAGNOSIS — I10 ESSENTIAL HYPERTENSION, BENIGN: Chronic | ICD-10-CM

## 2025-04-06 DIAGNOSIS — M81.0 SENILE OSTEOPOROSIS: ICD-10-CM

## 2025-04-07 RX ORDER — ESTRADIOL 0.5 MG/1
0.5 TABLET ORAL DAILY
Qty: 90 TABLET | Refills: 0 | Status: SHIPPED | OUTPATIENT
Start: 2025-04-07

## 2025-04-07 RX ORDER — METOPROLOL SUCCINATE 50 MG/1
50 TABLET, EXTENDED RELEASE ORAL DAILY
Qty: 90 TABLET | Refills: 0 | Status: SHIPPED | OUTPATIENT
Start: 2025-04-07

## 2025-05-24 ENCOUNTER — HEALTH MAINTENANCE LETTER (OUTPATIENT)
Age: 85
End: 2025-05-24

## 2025-06-29 DIAGNOSIS — M81.0 SENILE OSTEOPOROSIS: ICD-10-CM

## 2025-06-29 DIAGNOSIS — I10 ESSENTIAL HYPERTENSION, BENIGN: Chronic | ICD-10-CM

## 2025-06-30 RX ORDER — ESTRADIOL 0.5 MG/1
0.5 TABLET ORAL DAILY
Qty: 90 TABLET | Refills: 0 | Status: SHIPPED | OUTPATIENT
Start: 2025-06-30

## 2025-06-30 RX ORDER — METOPROLOL SUCCINATE 50 MG
50 TABLET, EXTENDED RELEASE 24 HR ORAL DAILY
Qty: 90 TABLET | Refills: 0 | Status: SHIPPED | OUTPATIENT
Start: 2025-06-30

## 2025-08-23 SDOH — HEALTH STABILITY: PHYSICAL HEALTH: ON AVERAGE, HOW MANY MINUTES DO YOU ENGAGE IN EXERCISE AT THIS LEVEL?: 30 MIN

## 2025-08-23 SDOH — HEALTH STABILITY: PHYSICAL HEALTH: ON AVERAGE, HOW MANY DAYS PER WEEK DO YOU ENGAGE IN MODERATE TO STRENUOUS EXERCISE (LIKE A BRISK WALK)?: 1 DAY

## 2025-08-28 ENCOUNTER — OFFICE VISIT (OUTPATIENT)
Dept: FAMILY MEDICINE | Facility: CLINIC | Age: 85
End: 2025-08-28
Payer: COMMERCIAL

## 2025-08-28 VITALS
DIASTOLIC BLOOD PRESSURE: 95 MMHG | HEART RATE: 73 BPM | WEIGHT: 128.6 LBS | BODY MASS INDEX: 24.28 KG/M2 | RESPIRATION RATE: 16 BRPM | OXYGEN SATURATION: 93 % | TEMPERATURE: 97.6 F | SYSTOLIC BLOOD PRESSURE: 159 MMHG | HEIGHT: 61 IN

## 2025-08-28 DIAGNOSIS — M81.0 SENILE OSTEOPOROSIS: ICD-10-CM

## 2025-08-28 DIAGNOSIS — Z12.31 ENCOUNTER FOR SCREENING MAMMOGRAM FOR BREAST CANCER: ICD-10-CM

## 2025-08-28 DIAGNOSIS — M41.85 OTHER FORMS OF SCOLIOSIS, THORACOLUMBAR REGION: ICD-10-CM

## 2025-08-28 DIAGNOSIS — I10 ESSENTIAL HYPERTENSION, BENIGN: ICD-10-CM

## 2025-08-28 DIAGNOSIS — N39.41 URGE INCONTINENCE: ICD-10-CM

## 2025-08-28 DIAGNOSIS — N39.42 URINARY INCONTINENCE WITHOUT SENSORY AWARENESS: ICD-10-CM

## 2025-08-28 DIAGNOSIS — L12.1: Chronic | ICD-10-CM

## 2025-08-28 DIAGNOSIS — Z78.9 LDL-C GREATER THAN OR EQUAL TO 100 MG/DL: ICD-10-CM

## 2025-08-28 DIAGNOSIS — M06.9 RHEUMATOID ARTHRITIS, INVOLVING UNSPECIFIED SITE, UNSPECIFIED WHETHER RHEUMATOID FACTOR PRESENT (H): ICD-10-CM

## 2025-08-28 DIAGNOSIS — G25.0 TREMOR, ESSENTIAL: ICD-10-CM

## 2025-08-28 DIAGNOSIS — Z00.00 ENCOUNTER FOR MEDICARE ANNUAL WELLNESS EXAM: Primary | ICD-10-CM

## 2025-08-28 PROBLEM — M99.03 SOMATIC DYSFUNCTION OF LUMBAR REGION: Status: RESOLVED | Noted: 2019-11-12 | Resolved: 2025-08-28

## 2025-08-28 PROBLEM — M99.04 SOMATIC DYSFUNCTION OF SACRAL REGION: Status: RESOLVED | Noted: 2019-11-12 | Resolved: 2025-08-28

## 2025-08-28 PROBLEM — M99.02 THORACIC SEGMENT DYSFUNCTION: Status: RESOLVED | Noted: 2019-11-12 | Resolved: 2025-08-28

## 2025-08-28 LAB
ANION GAP SERPL CALCULATED.3IONS-SCNC: 10 MMOL/L (ref 7–15)
BUN SERPL-MCNC: 13.8 MG/DL (ref 8–23)
CALCIUM SERPL-MCNC: 9.5 MG/DL (ref 8.8–10.4)
CHLORIDE SERPL-SCNC: 103 MMOL/L (ref 98–107)
CHOLEST SERPL-MCNC: 178 MG/DL
CREAT SERPL-MCNC: 0.62 MG/DL (ref 0.51–0.95)
EGFRCR SERPLBLD CKD-EPI 2021: 88 ML/MIN/1.73M2
FASTING STATUS PATIENT QL REPORTED: NO
FASTING STATUS PATIENT QL REPORTED: NO
GLUCOSE SERPL-MCNC: 97 MG/DL (ref 70–99)
HCO3 SERPL-SCNC: 26 MMOL/L (ref 22–29)
HDLC SERPL-MCNC: 61 MG/DL
LDLC SERPL CALC-MCNC: 100 MG/DL
NONHDLC SERPL-MCNC: 117 MG/DL
POTASSIUM SERPL-SCNC: 4.1 MMOL/L (ref 3.4–5.3)
SODIUM SERPL-SCNC: 139 MMOL/L (ref 135–145)
TRIGL SERPL-MCNC: 87 MG/DL

## 2025-08-28 RX ORDER — DIPHENHYDRAMINE HYDROCHLORIDE 50 MG/ML
25 INJECTION, SOLUTION INTRAMUSCULAR; INTRAVENOUS
Start: 2025-09-04

## 2025-08-28 RX ORDER — HEPARIN SODIUM,PORCINE 10 UNIT/ML
5-20 VIAL (ML) INTRAVENOUS DAILY PRN
OUTPATIENT
Start: 2025-09-04

## 2025-08-28 RX ORDER — DIPHENHYDRAMINE HYDROCHLORIDE 50 MG/ML
50 INJECTION, SOLUTION INTRAMUSCULAR; INTRAVENOUS
Start: 2025-09-04

## 2025-08-28 RX ORDER — HEPARIN SODIUM (PORCINE) LOCK FLUSH IV SOLN 100 UNIT/ML 100 UNIT/ML
5 SOLUTION INTRAVENOUS
OUTPATIENT
Start: 2025-09-04

## 2025-08-28 RX ORDER — ZOLEDRONIC ACID 0.05 MG/ML
5 INJECTION, SOLUTION INTRAVENOUS ONCE
COMMUNITY
Start: 2025-08-28

## 2025-08-28 RX ORDER — ALBUTEROL SULFATE 0.83 MG/ML
2.5 SOLUTION RESPIRATORY (INHALATION)
OUTPATIENT
Start: 2025-09-04

## 2025-08-28 RX ORDER — ZOLEDRONIC ACID 0.05 MG/ML
5 INJECTION, SOLUTION INTRAVENOUS ONCE
Start: 2025-09-04

## 2025-08-28 RX ORDER — METHYLPREDNISOLONE SODIUM SUCCINATE 40 MG/ML
40 INJECTION INTRAMUSCULAR; INTRAVENOUS
Start: 2025-09-04

## 2025-08-28 RX ORDER — EPINEPHRINE 1 MG/ML
0.3 INJECTION, SOLUTION, CONCENTRATE INTRAVENOUS EVERY 5 MIN PRN
OUTPATIENT
Start: 2025-09-04

## 2025-08-28 RX ORDER — ALBUTEROL SULFATE 90 UG/1
1-2 INHALANT RESPIRATORY (INHALATION)
Start: 2025-09-04

## 2025-08-28 RX ORDER — MEPERIDINE HYDROCHLORIDE 25 MG/ML
25 INJECTION INTRAMUSCULAR; INTRAVENOUS; SUBCUTANEOUS
OUTPATIENT
Start: 2025-09-04

## 2025-08-28 SDOH — HEALTH STABILITY: PHYSICAL HEALTH: ON AVERAGE, HOW MANY DAYS PER WEEK DO YOU ENGAGE IN MODERATE TO STRENUOUS EXERCISE (LIKE A BRISK WALK)?: 1 DAY

## 2025-08-28 SDOH — HEALTH STABILITY: PHYSICAL HEALTH: ON AVERAGE, HOW MANY MINUTES DO YOU ENGAGE IN EXERCISE AT THIS LEVEL?: 30 MIN

## 2025-08-28 ASSESSMENT — PAIN SCALES - GENERAL: PAINLEVEL_OUTOF10: NO PAIN (0)

## (undated) RX ORDER — LIDOCAINE HYDROCHLORIDE 10 MG/ML
INJECTION, SOLUTION EPIDURAL; INFILTRATION; INTRACAUDAL; PERINEURAL
Status: DISPENSED
Start: 2018-01-12

## (undated) RX ORDER — DEXAMETHASONE SODIUM PHOSPHATE 10 MG/ML
INJECTION, SOLUTION INTRAMUSCULAR; INTRAVENOUS
Status: DISPENSED
Start: 2018-01-12

## (undated) RX ORDER — FENTANYL CITRATE 50 UG/ML
INJECTION, SOLUTION INTRAMUSCULAR; INTRAVENOUS
Status: DISPENSED
Start: 2017-05-25